# Patient Record
Sex: FEMALE | Race: OTHER | NOT HISPANIC OR LATINO | URBAN - METROPOLITAN AREA
[De-identification: names, ages, dates, MRNs, and addresses within clinical notes are randomized per-mention and may not be internally consistent; named-entity substitution may affect disease eponyms.]

---

## 2017-10-08 ENCOUNTER — INPATIENT (INPATIENT)
Facility: HOSPITAL | Age: 67
LOS: 2 days | Discharge: HOME | DRG: 308 | End: 2017-10-11
Attending: FAMILY MEDICINE | Admitting: HOSPITALIST
Payer: MEDICARE

## 2017-10-08 VITALS
TEMPERATURE: 98 F | HEIGHT: 61 IN | SYSTOLIC BLOOD PRESSURE: 115 MMHG | WEIGHT: 132.06 LBS | RESPIRATION RATE: 20 BRPM | DIASTOLIC BLOOD PRESSURE: 78 MMHG | HEART RATE: 132 BPM | OXYGEN SATURATION: 96 %

## 2017-10-08 DIAGNOSIS — Z95.2 PRESENCE OF PROSTHETIC HEART VALVE: Chronic | ICD-10-CM

## 2017-10-08 DIAGNOSIS — Z41.9 ENCOUNTER FOR PROCEDURE FOR PURPOSES OTHER THAN REMEDYING HEALTH STATE, UNSPECIFIED: Chronic | ICD-10-CM

## 2017-10-08 LAB
ALBUMIN SERPL ELPH-MCNC: 3.5 G/DL — SIGNIFICANT CHANGE UP (ref 3.3–5.2)
ALP SERPL-CCNC: 90 U/L — SIGNIFICANT CHANGE UP (ref 40–120)
ALT FLD-CCNC: 15 U/L — SIGNIFICANT CHANGE UP
ANION GAP SERPL CALC-SCNC: 13 MMOL/L — SIGNIFICANT CHANGE UP (ref 5–17)
APTT BLD: 45.8 SEC — HIGH (ref 27.5–37.4)
AST SERPL-CCNC: 22 U/L — SIGNIFICANT CHANGE UP
BASOPHILS # BLD AUTO: 0.1 K/UL — SIGNIFICANT CHANGE UP (ref 0–0.2)
BASOPHILS NFR BLD AUTO: 0.6 % — SIGNIFICANT CHANGE UP (ref 0–2)
BILIRUB SERPL-MCNC: 0.4 MG/DL — SIGNIFICANT CHANGE UP (ref 0.4–2)
BUN SERPL-MCNC: 15 MG/DL — SIGNIFICANT CHANGE UP (ref 8–20)
CALCIUM SERPL-MCNC: 7.8 MG/DL — LOW (ref 8.6–10.2)
CHLORIDE SERPL-SCNC: 106 MMOL/L — SIGNIFICANT CHANGE UP (ref 98–107)
CO2 SERPL-SCNC: 21 MMOL/L — LOW (ref 22–29)
CREAT SERPL-MCNC: 0.56 MG/DL — SIGNIFICANT CHANGE UP (ref 0.5–1.3)
EOSINOPHIL # BLD AUTO: 0.4 K/UL — SIGNIFICANT CHANGE UP (ref 0–0.5)
EOSINOPHIL NFR BLD AUTO: 4 % — SIGNIFICANT CHANGE UP (ref 0–6)
GLUCOSE SERPL-MCNC: 192 MG/DL — HIGH (ref 70–115)
HCT VFR BLD CALC: 35 % — LOW (ref 37–47)
HGB BLD-MCNC: 11.2 G/DL — LOW (ref 12–16)
INR BLD: 3.43 RATIO — HIGH (ref 0.88–1.16)
LYMPHOCYTES # BLD AUTO: 2.2 K/UL — SIGNIFICANT CHANGE UP (ref 1–4.8)
LYMPHOCYTES # BLD AUTO: 23.2 % — SIGNIFICANT CHANGE UP (ref 20–55)
MAGNESIUM SERPL-MCNC: 1.6 MG/DL — SIGNIFICANT CHANGE UP (ref 1.6–2.6)
MCHC RBC-ENTMCNC: 30.4 PG — SIGNIFICANT CHANGE UP (ref 27–31)
MCHC RBC-ENTMCNC: 32 G/DL — SIGNIFICANT CHANGE UP (ref 32–36)
MCV RBC AUTO: 94.9 FL — SIGNIFICANT CHANGE UP (ref 81–99)
MONOCYTES # BLD AUTO: 0.8 K/UL — SIGNIFICANT CHANGE UP (ref 0–0.8)
MONOCYTES NFR BLD AUTO: 8.4 % — SIGNIFICANT CHANGE UP (ref 3–10)
NEUTROPHILS # BLD AUTO: 6 K/UL — SIGNIFICANT CHANGE UP (ref 1.8–8)
NEUTROPHILS NFR BLD AUTO: 63.4 % — SIGNIFICANT CHANGE UP (ref 37–73)
NT-PROBNP SERPL-SCNC: 681 PG/ML — HIGH (ref 0–300)
PHOSPHATE SERPL-MCNC: 2.5 MG/DL — SIGNIFICANT CHANGE UP (ref 2.4–4.7)
PLATELET # BLD AUTO: 160 K/UL — SIGNIFICANT CHANGE UP (ref 150–400)
POTASSIUM SERPL-MCNC: 3.6 MMOL/L — SIGNIFICANT CHANGE UP (ref 3.5–5.3)
POTASSIUM SERPL-SCNC: 3.6 MMOL/L — SIGNIFICANT CHANGE UP (ref 3.5–5.3)
PROT SERPL-MCNC: 6.2 G/DL — LOW (ref 6.6–8.7)
PROTHROM AB SERPL-ACNC: 38.7 SEC — HIGH (ref 9.8–12.7)
RBC # BLD: 3.69 M/UL — LOW (ref 4.4–5.2)
RBC # FLD: 13.8 % — SIGNIFICANT CHANGE UP (ref 11–15.6)
SODIUM SERPL-SCNC: 140 MMOL/L — SIGNIFICANT CHANGE UP (ref 135–145)
TROPONIN T SERPL-MCNC: <0.01 NG/ML — SIGNIFICANT CHANGE UP (ref 0–0.06)
TSH SERPL-MCNC: 84.52 UIU/ML — HIGH (ref 0.27–4.2)
WBC # BLD: 9.4 K/UL — SIGNIFICANT CHANGE UP (ref 4.8–10.8)
WBC # FLD AUTO: 9.4 K/UL — SIGNIFICANT CHANGE UP (ref 4.8–10.8)

## 2017-10-08 PROCEDURE — 99291 CRITICAL CARE FIRST HOUR: CPT

## 2017-10-08 PROCEDURE — 93010 ELECTROCARDIOGRAM REPORT: CPT | Mod: 76

## 2017-10-08 PROCEDURE — 71010: CPT | Mod: 26

## 2017-10-08 RX ORDER — ASPIRIN/CALCIUM CARB/MAGNESIUM 324 MG
162 TABLET ORAL ONCE
Qty: 0 | Refills: 0 | Status: COMPLETED | OUTPATIENT
Start: 2017-10-08 | End: 2017-10-08

## 2017-10-08 RX ORDER — MAGNESIUM SULFATE 500 MG/ML
2 VIAL (ML) INJECTION ONCE
Qty: 0 | Refills: 0 | Status: COMPLETED | OUTPATIENT
Start: 2017-10-08 | End: 2017-10-08

## 2017-10-08 RX ORDER — AMIODARONE HYDROCHLORIDE 400 MG/1
150 TABLET ORAL ONCE
Qty: 0 | Refills: 0 | Status: COMPLETED | OUTPATIENT
Start: 2017-10-08 | End: 2017-10-08

## 2017-10-08 RX ORDER — AMIODARONE HYDROCHLORIDE 400 MG/1
1 TABLET ORAL
Qty: 900 | Refills: 0 | Status: DISCONTINUED | OUTPATIENT
Start: 2017-10-08 | End: 2017-10-08

## 2017-10-08 RX ADMIN — Medication 162 MILLIGRAM(S): at 22:15

## 2017-10-08 RX ADMIN — Medication 50 GRAM(S): at 21:22

## 2017-10-08 RX ADMIN — AMIODARONE HYDROCHLORIDE 618 MILLIGRAM(S): 400 TABLET ORAL at 21:25

## 2017-10-08 NOTE — ED ADULT TRIAGE NOTE - CHIEF COMPLAINT QUOTE
patient biba states that she has palpitations which started around 7:50 tonight, she states that her heart rate went up to 140, with complaints of shortness of breath and chest pain, she states that she was cleaning prior to symptoms, patient received 324 asa and fluids by ems.

## 2017-10-08 NOTE — ED PROVIDER NOTE - MEDICAL DECISION MAKING DETAILS
stable vtach upon presentation converedted with viji lopez and  spoke with dr whiting agrees with plan of care acs admission need for multiple bedside reassessments cardiopulmonary status

## 2017-10-08 NOTE — ED ADULT NURSE NOTE - OBJECTIVE STATEMENT
Pt brought in for elevated HR, chest tightness, nausea and sob starting at 8pm.  Denies chest pain; describes as chest tightness.  As per daughter, pt. was cleaning and started to feel "palpitations."  Before symptoms started, pt. was on a stool cleaning top of [employer].

## 2017-10-08 NOTE — ED ADULT NURSE REASSESSMENT NOTE - NS ED NURSE REASSESS COMMENT FT1
2L supplemental O2 applied for pt. O2 sat 89% room air.  Pt. O2 sat 95% following application of supplemental O2.

## 2017-10-08 NOTE — ED PROVIDER NOTE - CHPI ED SYMPTOMS POS
CHEST PAIN/lightheaded/PALPITATIONS CHEST TIGHTNESS/lightheaded/SHORTNESS OF BREATH/PALPITATIONS/NAUSEA

## 2017-10-08 NOTE — ED PROVIDER NOTE - PMH
AICD (automatic cardioverter/defibrillator) present    Cataract  bialt  Diabetes    Glaucoma    HTN (hypertension)    Hypothyroid    Mitral valve disease    Sarcoid

## 2017-10-08 NOTE — ED ADULT NURSE REASSESSMENT NOTE - NS ED NURSE REASSESS COMMENT FT1
Patient A&Ox4, denies any pain or discomfort. Respirations even & unlabored, denies any numbness or tingling. Cardiac monitor in place, paced rhythm. IV sites C/D/I, patent, negative s/s phlebitis or infiltration. Denies any chest pain, shortness of breath, nausea or dizziness. Abdomen soft, nontender, nondistended. Plan of care discussed, all questions answered. Family remains at bedside, will continue to monitor.

## 2017-10-08 NOTE — ED PROVIDER NOTE - CRITICAL CARE PROVIDED
direct patient care (not related to procedure)/consult w/ pt's family directly relating to pts condition/additional history taking/interpretation of diagnostic studies/documentation

## 2017-10-08 NOTE — ED PROVIDER NOTE - OBJECTIVE STATEMENT
68 y/o F pt with hx of pacemaker/ defibrillator , Mitral valve replacement (2005), open heart surgery (2005) presents to ED c/o palpitations and chest pain starting 19:50 tonight. She currently feeling lightheaded.  No previous symptoms of palpitations.  On Coumadin and recently placed on Entresto. no hx of DVT or PE. denies fever. denies HA or neck pain. no sob. no abd pain. no n/v/d. no urinary f/u/d. no back pain. no motor or sensory deficits. denies drug use. no recent travel. no rash. no other acute issues symptoms or concerns   Card: Chano Barfield (Cell: [phone])   PMD: Dr. Cleary   Surgeon: Dr. Cornejo 66 y/o F pt with hx of pacemaker/ defibrillator , Mitral valve replacement (2005), open heart surgery (2005) presents to ED c/o palpitations associated with nausea, mild SOB, lightheaded, chest tightness 19:50 tonight.   No previous symptoms of palpitations.  On Coumadin and recently placed on Entresto. no hx of DVT or PE. Daughter called pt's cardiologist PTA. denies fever. denies HA or neck pain. no abd pain. no v/d. no urinary f/u/d. no back pain. no motor or sensory deficits. denies drug use. no recent travel. no rash. no other acute issues symptoms or concerns   Card: Chano Barfield (Cell: [phone])   PMD: Dr. Cleary   Surgeon: Dr. Cornejo 66 y/o F pt with hx of pacemaker/ defibrillator , Mitral valve replacement (2005), open heart surgery (2005) presents to ED BIBA c/o palpitations associated with nausea, mild SOB, lightheaded, chest tightness 19:50 tonight.   No previous symptoms of palpitations.  On Coumadin and recently placed on Entresto. no hx of DVT or PE. Daughter called pt's cardiologist PTA. 324mg aspirin given by EMS. denies fever. denies HA or neck pain. no abd pain. no v/d. no urinary f/u/d. no back pain. no motor or sensory deficits. denies drug use. no recent travel. no rash. no other acute issues symptoms or concerns   Card: Chano Barfield (Cell: [phone])   PMD: Dr. Cleary   Surgeon: Dr. Cornejo

## 2017-10-08 NOTE — ED ADULT NURSE REASSESSMENT NOTE - NS ED NURSE REASSESS COMMENT FT1
Dr. Powell at patients bedside for evaluation Dr. Powell at patients bedside for evaluation, code team 1 called to bedside

## 2017-10-09 DIAGNOSIS — E11.65 TYPE 2 DIABETES MELLITUS WITH HYPERGLYCEMIA: ICD-10-CM

## 2017-10-09 DIAGNOSIS — D64.9 ANEMIA, UNSPECIFIED: ICD-10-CM

## 2017-10-09 DIAGNOSIS — D86.9 SARCOIDOSIS, UNSPECIFIED: ICD-10-CM

## 2017-10-09 DIAGNOSIS — Z95.810 PRESENCE OF AUTOMATIC (IMPLANTABLE) CARDIAC DEFIBRILLATOR: ICD-10-CM

## 2017-10-09 DIAGNOSIS — I10 ESSENTIAL (PRIMARY) HYPERTENSION: ICD-10-CM

## 2017-10-09 DIAGNOSIS — Z95.2 PRESENCE OF PROSTHETIC HEART VALVE: ICD-10-CM

## 2017-10-09 DIAGNOSIS — H40.9 UNSPECIFIED GLAUCOMA: ICD-10-CM

## 2017-10-09 DIAGNOSIS — I47.2 VENTRICULAR TACHYCARDIA: ICD-10-CM

## 2017-10-09 DIAGNOSIS — E03.9 HYPOTHYROIDISM, UNSPECIFIED: ICD-10-CM

## 2017-10-09 DIAGNOSIS — I47.1 SUPRAVENTRICULAR TACHYCARDIA: ICD-10-CM

## 2017-10-09 DIAGNOSIS — I24.9 ACUTE ISCHEMIC HEART DISEASE, UNSPECIFIED: ICD-10-CM

## 2017-10-09 DIAGNOSIS — I42.0 DILATED CARDIOMYOPATHY: ICD-10-CM

## 2017-10-09 DIAGNOSIS — Z29.9 ENCOUNTER FOR PROPHYLACTIC MEASURES, UNSPECIFIED: ICD-10-CM

## 2017-10-09 DIAGNOSIS — I05.9 RHEUMATIC MITRAL VALVE DISEASE, UNSPECIFIED: ICD-10-CM

## 2017-10-09 DIAGNOSIS — I50.23 ACUTE ON CHRONIC SYSTOLIC (CONGESTIVE) HEART FAILURE: ICD-10-CM

## 2017-10-09 LAB
ALBUMIN SERPL ELPH-MCNC: 3.9 G/DL — SIGNIFICANT CHANGE UP (ref 3.3–5.2)
ALP SERPL-CCNC: 106 U/L — SIGNIFICANT CHANGE UP (ref 40–120)
ALT FLD-CCNC: 15 U/L — SIGNIFICANT CHANGE UP
ANION GAP SERPL CALC-SCNC: 10 MMOL/L — SIGNIFICANT CHANGE UP (ref 5–17)
APPEARANCE UR: CLEAR — SIGNIFICANT CHANGE UP
APTT BLD: 47 SEC — HIGH (ref 27.5–37.4)
AST SERPL-CCNC: 21 U/L — SIGNIFICANT CHANGE UP
BILIRUB SERPL-MCNC: 0.7 MG/DL — SIGNIFICANT CHANGE UP (ref 0.4–2)
BILIRUB UR-MCNC: NEGATIVE — SIGNIFICANT CHANGE UP
BUN SERPL-MCNC: 11 MG/DL — SIGNIFICANT CHANGE UP (ref 8–20)
CALCIUM SERPL-MCNC: 8.4 MG/DL — LOW (ref 8.6–10.2)
CHLORIDE SERPL-SCNC: 106 MMOL/L — SIGNIFICANT CHANGE UP (ref 98–107)
CHOLEST SERPL-MCNC: 165 MG/DL — SIGNIFICANT CHANGE UP (ref 110–199)
CO2 SERPL-SCNC: 26 MMOL/L — SIGNIFICANT CHANGE UP (ref 22–29)
COLOR SPEC: YELLOW — SIGNIFICANT CHANGE UP
CREAT SERPL-MCNC: 0.56 MG/DL — SIGNIFICANT CHANGE UP (ref 0.5–1.3)
DIFF PNL FLD: ABNORMAL
GLUCOSE SERPL-MCNC: 134 MG/DL — HIGH (ref 70–115)
GLUCOSE UR QL: NEGATIVE MG/DL — SIGNIFICANT CHANGE UP
HBA1C BLD-MCNC: 6.1 % — HIGH (ref 4–5.6)
HCT VFR BLD CALC: 34.7 % — LOW (ref 37–47)
HDLC SERPL-MCNC: 58 MG/DL — LOW
HGB BLD-MCNC: 11.3 G/DL — LOW (ref 12–16)
INR BLD: 3.09 RATIO — HIGH (ref 0.88–1.16)
KETONES UR-MCNC: NEGATIVE — SIGNIFICANT CHANGE UP
LEUKOCYTE ESTERASE UR-ACNC: NEGATIVE — SIGNIFICANT CHANGE UP
LIPID PNL WITH DIRECT LDL SERPL: 84 MG/DL — SIGNIFICANT CHANGE UP
MAGNESIUM SERPL-MCNC: 2.4 MG/DL — SIGNIFICANT CHANGE UP (ref 1.6–2.6)
MCHC RBC-ENTMCNC: 30.7 PG — SIGNIFICANT CHANGE UP (ref 27–31)
MCHC RBC-ENTMCNC: 32.6 G/DL — SIGNIFICANT CHANGE UP (ref 32–36)
MCV RBC AUTO: 94.3 FL — SIGNIFICANT CHANGE UP (ref 81–99)
MYOGLOBIN SERPL-MCNC: 31 NG/ML — SIGNIFICANT CHANGE UP (ref 25–58)
NITRITE UR-MCNC: NEGATIVE — SIGNIFICANT CHANGE UP
PH UR: 7 — SIGNIFICANT CHANGE UP (ref 5–8)
PHOSPHATE SERPL-MCNC: 2.6 MG/DL — SIGNIFICANT CHANGE UP (ref 2.4–4.7)
PLATELET # BLD AUTO: 140 K/UL — LOW (ref 150–400)
POTASSIUM SERPL-MCNC: 4.4 MMOL/L — SIGNIFICANT CHANGE UP (ref 3.5–5.3)
POTASSIUM SERPL-SCNC: 4.4 MMOL/L — SIGNIFICANT CHANGE UP (ref 3.5–5.3)
PROT SERPL-MCNC: 6.6 G/DL — SIGNIFICANT CHANGE UP (ref 6.6–8.7)
PROT UR-MCNC: NEGATIVE MG/DL — SIGNIFICANT CHANGE UP
PROTHROM AB SERPL-ACNC: 34.8 SEC — HIGH (ref 9.8–12.7)
RBC # BLD: 3.68 M/UL — LOW (ref 4.4–5.2)
RBC # FLD: 14 % — SIGNIFICANT CHANGE UP (ref 11–15.6)
RBC CASTS # UR COMP ASSIST: NEGATIVE /HPF — SIGNIFICANT CHANGE UP (ref 0–4)
SODIUM SERPL-SCNC: 142 MMOL/L — SIGNIFICANT CHANGE UP (ref 135–145)
SP GR SPEC: 1.01 — SIGNIFICANT CHANGE UP (ref 1.01–1.02)
T3 SERPL-MCNC: 47 NG/DL — LOW (ref 80–200)
T4 AB SER-ACNC: 4 UG/DL — LOW (ref 4.5–12)
TOTAL CHOLESTEROL/HDL RATIO MEASUREMENT: 3 RATIO — LOW (ref 3.3–7.1)
TRIGL SERPL-MCNC: 115 MG/DL — SIGNIFICANT CHANGE UP (ref 10–200)
TROPONIN T SERPL-MCNC: 0.04 NG/ML — SIGNIFICANT CHANGE UP (ref 0–0.06)
TROPONIN T SERPL-MCNC: 0.05 NG/ML — SIGNIFICANT CHANGE UP (ref 0–0.06)
UROBILINOGEN FLD QL: NEGATIVE MG/DL — SIGNIFICANT CHANGE UP
WBC # BLD: 9.3 K/UL — SIGNIFICANT CHANGE UP (ref 4.8–10.8)
WBC # FLD AUTO: 9.3 K/UL — SIGNIFICANT CHANGE UP (ref 4.8–10.8)
WBC UR QL: SIGNIFICANT CHANGE UP

## 2017-10-09 PROCEDURE — 99223 1ST HOSP IP/OBS HIGH 75: CPT | Mod: 25

## 2017-10-09 PROCEDURE — 93010 ELECTROCARDIOGRAM REPORT: CPT

## 2017-10-09 PROCEDURE — 99497 ADVNCD CARE PLAN 30 MIN: CPT

## 2017-10-09 RX ORDER — ASPIRIN/CALCIUM CARB/MAGNESIUM 324 MG
81 TABLET ORAL DAILY
Qty: 0 | Refills: 0 | Status: DISCONTINUED | OUTPATIENT
Start: 2017-10-09 | End: 2017-10-11

## 2017-10-09 RX ORDER — SACUBITRIL AND VALSARTAN 24; 26 MG/1; MG/1
1 TABLET, FILM COATED ORAL
Qty: 0 | Refills: 0 | Status: DISCONTINUED | OUTPATIENT
Start: 2017-10-09 | End: 2017-10-11

## 2017-10-09 RX ORDER — FUROSEMIDE 40 MG
20 TABLET ORAL DAILY
Qty: 0 | Refills: 0 | Status: DISCONTINUED | OUTPATIENT
Start: 2017-10-09 | End: 2017-10-11

## 2017-10-09 RX ORDER — PREDNISOLONE SODIUM PHOSPHATE 1 %
1 DROPS OPHTHALMIC (EYE) DAILY
Qty: 0 | Refills: 0 | Status: DISCONTINUED | OUTPATIENT
Start: 2017-10-09 | End: 2017-10-11

## 2017-10-09 RX ORDER — SODIUM CHLORIDE 9 MG/ML
1000 INJECTION, SOLUTION INTRAVENOUS
Qty: 0 | Refills: 0 | Status: DISCONTINUED | OUTPATIENT
Start: 2017-10-09 | End: 2017-10-11

## 2017-10-09 RX ORDER — LEVOTHYROXINE SODIUM 125 MCG
75 TABLET ORAL DAILY
Qty: 0 | Refills: 0 | Status: DISCONTINUED | OUTPATIENT
Start: 2017-10-09 | End: 2017-10-11

## 2017-10-09 RX ORDER — LATANOPROST 0.05 MG/ML
1 SOLUTION/ DROPS OPHTHALMIC; TOPICAL AT BEDTIME
Qty: 0 | Refills: 0 | Status: DISCONTINUED | OUTPATIENT
Start: 2017-10-09 | End: 2017-10-11

## 2017-10-09 RX ORDER — DEXTROSE 50 % IN WATER 50 %
12.5 SYRINGE (ML) INTRAVENOUS ONCE
Qty: 0 | Refills: 0 | Status: DISCONTINUED | OUTPATIENT
Start: 2017-10-09 | End: 2017-10-11

## 2017-10-09 RX ORDER — DEXTROSE 50 % IN WATER 50 %
25 SYRINGE (ML) INTRAVENOUS ONCE
Qty: 0 | Refills: 0 | Status: DISCONTINUED | OUTPATIENT
Start: 2017-10-09 | End: 2017-10-11

## 2017-10-09 RX ORDER — NITROGLYCERIN 6.5 MG
0.5 CAPSULE, EXTENDED RELEASE ORAL EVERY 6 HOURS
Qty: 0 | Refills: 0 | Status: DISCONTINUED | OUTPATIENT
Start: 2017-10-09 | End: 2017-10-11

## 2017-10-09 RX ORDER — DORZOLAMIDE HYDROCHLORIDE 20 MG/ML
1 SOLUTION/ DROPS OPHTHALMIC DAILY
Qty: 0 | Refills: 0 | Status: DISCONTINUED | OUTPATIENT
Start: 2017-10-09 | End: 2017-10-11

## 2017-10-09 RX ORDER — CARVEDILOL PHOSPHATE 80 MG/1
3.12 CAPSULE, EXTENDED RELEASE ORAL EVERY 12 HOURS
Qty: 0 | Refills: 0 | Status: DISCONTINUED | OUTPATIENT
Start: 2017-10-09 | End: 2017-10-11

## 2017-10-09 RX ORDER — CARVEDILOL PHOSPHATE 80 MG/1
3.12 CAPSULE, EXTENDED RELEASE ORAL
Qty: 0 | Refills: 0 | Status: DISCONTINUED | OUTPATIENT
Start: 2017-10-09 | End: 2017-10-09

## 2017-10-09 RX ORDER — INSULIN LISPRO 100/ML
VIAL (ML) SUBCUTANEOUS
Qty: 0 | Refills: 0 | Status: DISCONTINUED | OUTPATIENT
Start: 2017-10-09 | End: 2017-10-11

## 2017-10-09 RX ORDER — ATORVASTATIN CALCIUM 80 MG/1
20 TABLET, FILM COATED ORAL AT BEDTIME
Qty: 0 | Refills: 0 | Status: DISCONTINUED | OUTPATIENT
Start: 2017-10-09 | End: 2017-10-11

## 2017-10-09 RX ORDER — SOTALOL HCL 120 MG
80 TABLET ORAL
Qty: 0 | Refills: 0 | Status: DISCONTINUED | OUTPATIENT
Start: 2017-10-09 | End: 2017-10-10

## 2017-10-09 RX ORDER — AMIODARONE HYDROCHLORIDE 400 MG/1
400 TABLET ORAL THREE TIMES A DAY
Qty: 0 | Refills: 0 | Status: DISCONTINUED | OUTPATIENT
Start: 2017-10-09 | End: 2017-10-10

## 2017-10-09 RX ORDER — INFLUENZA VIRUS VACCINE 15; 15; 15; 15 UG/.5ML; UG/.5ML; UG/.5ML; UG/.5ML
0.5 SUSPENSION INTRAMUSCULAR ONCE
Qty: 0 | Refills: 0 | Status: COMPLETED | OUTPATIENT
Start: 2017-10-09 | End: 2017-10-11

## 2017-10-09 RX ORDER — DEXTROSE 50 % IN WATER 50 %
1 SYRINGE (ML) INTRAVENOUS ONCE
Qty: 0 | Refills: 0 | Status: DISCONTINUED | OUTPATIENT
Start: 2017-10-09 | End: 2017-10-11

## 2017-10-09 RX ORDER — GLUCAGON INJECTION, SOLUTION 0.5 MG/.1ML
1 INJECTION, SOLUTION SUBCUTANEOUS ONCE
Qty: 0 | Refills: 0 | Status: DISCONTINUED | OUTPATIENT
Start: 2017-10-09 | End: 2017-10-11

## 2017-10-09 RX ADMIN — SACUBITRIL AND VALSARTAN 1 TABLET(S): 24; 26 TABLET, FILM COATED ORAL at 06:32

## 2017-10-09 RX ADMIN — Medication 80 MILLIGRAM(S): at 17:31

## 2017-10-09 RX ADMIN — Medication 75 MICROGRAM(S): at 06:32

## 2017-10-09 RX ADMIN — DORZOLAMIDE HYDROCHLORIDE 1 DROP(S): 20 SOLUTION/ DROPS OPHTHALMIC at 15:32

## 2017-10-09 RX ADMIN — CARVEDILOL PHOSPHATE 3.12 MILLIGRAM(S): 80 CAPSULE, EXTENDED RELEASE ORAL at 06:32

## 2017-10-09 RX ADMIN — Medication 20 MILLIGRAM(S): at 13:20

## 2017-10-09 RX ADMIN — Medication 81 MILLIGRAM(S): at 13:19

## 2017-10-09 RX ADMIN — Medication 80 MILLIGRAM(S): at 06:32

## 2017-10-09 RX ADMIN — Medication 1 DROP(S): at 14:48

## 2017-10-09 RX ADMIN — ATORVASTATIN CALCIUM 20 MILLIGRAM(S): 80 TABLET, FILM COATED ORAL at 21:54

## 2017-10-09 RX ADMIN — CARVEDILOL PHOSPHATE 3.12 MILLIGRAM(S): 80 CAPSULE, EXTENDED RELEASE ORAL at 17:31

## 2017-10-09 RX ADMIN — SACUBITRIL AND VALSARTAN 1 TABLET(S): 24; 26 TABLET, FILM COATED ORAL at 17:32

## 2017-10-09 RX ADMIN — AMIODARONE HYDROCHLORIDE 400 MILLIGRAM(S): 400 TABLET ORAL at 15:32

## 2017-10-09 NOTE — H&P ADULT - ATTENDING COMMENTS
Advance care planning discussed with patient and family, over 20 min spent discussing plan of care, laboratory findings, and code status. pt is FULL CODE. All questions answered and family and pt in agreement with plan and state understanding.

## 2017-10-09 NOTE — CONSULT NOTE ADULT - PROBLEM SELECTOR RECOMMENDATION 9
Patient presented 10/8/17 with stable, sustained and monomorphic VT with HR fo 138 bpm.  HR was below detection of patient's ICD.  1.  Discontinue Sotalol.  2.  Amiodarone 400 mg orally tid.   3.  Admit to telemetry.  2.  Amiodarone 400 mg orally tid.   3.  Will have ICD interrogated by [employer] rep.

## 2017-10-09 NOTE — H&P ADULT - ASSESSMENT
66 yo female with a pmh/o pacemaker/ defibrillator , Mitral valve replacement (2005), open heart surgery (2005), hypothyroidism, DMII, anemia, who is admitted for SVT.

## 2017-10-09 NOTE — CONSULT NOTE ADULT - ASSESSMENT
67 y.o female with h/o paroxysmal atrial fibrillation/tachycardia, MVP with  MR, nonischemic DCM, systemic sarcoidosis involving heart, eyes, nose diagnosed in '96 and treated with Prednisone, pulmonary sarcoidosis treated in '12 with steroids, hypothyroidism, paroxysmal VT treated with Sotalol and then Amiodarone, penumonia diagnosed 3/25/11, s/p CVA 5/16/11, s/p MVR with mechanical [place] valve 105/05, s/p DDD ICD implantation in '99, s/p upgrade to DDD biventricular ICD 9/2908, s/p DDD biventricular ICD pulse generator change1/29/14, who presented with nausea, dyspnea, chest pain at 8:00 PM after cleaning her fire place for 1 hour.  She found her heart rate elevated at 147 bpm and never recieved an ICD shock or had syncope.  She was BIBA and diagnosed with sustained monomorphic VT with RBBB/LIZZ at a HR of 138 bpm.  Patient converted to DDD paced rhythm after being given Amidoarone IVP and IV magnesium sulfate.  Patient admitted with stable, sustained, slow, and monomorphic ventricular tachycardia that terminated with IV Amiodarone.  CXR and physical exam suggestive of early acute on chronic systolic CHF.

## 2017-10-09 NOTE — H&P ADULT - PROBLEM SELECTOR PLAN 2
chest pain resolved once pt in sinus rhythm  serial CE  first trop wnl  TTE  HbA1c  fasting lipid panel  cont aspirin, on entresto  pt anticoagulated on coumadin  cont statin  EKG reviewed, EKG prn CP  NTG prn CP

## 2017-10-09 NOTE — PROGRESS NOTE ADULT - SUBJECTIVE AND OBJECTIVE BOX
LAURA MCDONALD Patient is a 67y old  Female who presents with a chief complaint of chest pain and palpitations (09 Oct 2017 01:46)     HPI:  6 y/o F pt with hx of pacemaker/ defibrillator , Mitral valve replacement (2005), open heart surgery (2005), DMII, HTN, HLD, vertigo who presented to ED via ambulance after experiencing chest pain at home while cleaning her fireplace. Pt describes the pain as centrally located in chest, heavy pressure/tightness in nature, non radiating, no a/a factors, associated with light headedness, palpitations, nausea, and SOB. Pt states she took her heart rate and it was in high 130's so her daughter called cardiologist and EMS. Daughter and  at bedside to provide additional history and confirm series of events as described pt patient. Pt denied any diaphoresis, abdominal pain, vomiting, diarrhea/constipation, gu sx, radiation to left arm/jaw, dizziness, loss of sensation, visual or speech changes, or weakness.   Of note, pt reports compliance with all medications except synthroid which she states she has missed 'a few' days worth of. (09 Oct 2017 01:46)    The patient was seen and evaluated   The patient is in no acute distress.  Denied any fever chest pain, palpitations, shortness of breath, abdominal pain, fever, dysuria, cough, edema   Complains of     Height (cm): 154.94 (10-08 @ 21:00)  Weight (kg): 59.9 (10-08 @ 21:00)  BMI (kg/m2): 25 (10-08 @ 21:00)  BSA (m2): 1.58 (10-08 @ 21:00)I&O's Summary    Allergies    No Known Allergies    Intolerances      HEALTH ISSUES - PROBLEM Dx:  SVT (supraventricular tachycardia): SVT (supraventricular tachycardia)  Need for prophylactic measure: Need for prophylactic measure  Glaucoma of both eyes, unspecified glaucoma type: Glaucoma of both eyes, unspecified glaucoma type  Acquired hypothyroidism: Acquired hypothyroidism  Type 2 diabetes mellitus with hyperglycemia, without long-term current use of insulin: Type 2 diabetes mellitus with hyperglycemia, without long-term current use of insulin  Mitral valve disease: Mitral valve disease  Anemia, unspecified type: Anemia, unspecified type  ACS (acute coronary syndrome): ACS (acute coronary syndrome)  Ventricular tachycardia: Ventricular tachycardia        PAST MEDICAL & SURGICAL HISTORY:  Glaucoma  Sarcoid  AICD (automatic cardioverter/defibrillator) present  Mitral valve disease  Cataract: bialt  HTN (hypertension)  Diabetes  Hypothyroid  H/O mitral valve replacement  Elective surgery: back surgury, mitral valve replcement. AICD          Vital Signs Last 24 Hrs  T(C): 36.6 (09 Oct 2017 08:46), Max: 37.2 (08 Oct 2017 21:11)  T(F): 97.8 (09 Oct 2017 08:46), Max: 99 (08 Oct 2017 21:11)  HR: 60 (09 Oct 2017 08:46) (60 - 132)  BP: 99/59 (09 Oct 2017 08:46) (99/59 - 115/78)  BP(mean): --  RR: 18 (09 Oct 2017 08:46) (18 - 20)  SpO2: 98% (09 Oct 2017 08:46) (96% - 98%)T(C): 36.6 (10-09-17 @ 08:46), Max: 37.2 (10-08-17 @ 21:11)  HR: 60 (10-09-17 @ 08:46) (60 - 132)  BP: 99/59 (10-09-17 @ 08:46) (99/59 - 115/78)  RR: 18 (10-09-17 @ 08:46) (18 - 20)  SpO2: 98% (10-09-17 @ 08:46) (96% - 98%)  Wt(kg): --    PHYSICAL EXAM:    GENERAL: NAD, well-groomed, well-developed  HEAD:  Atraumatic, Normocephalic  EYES: EOMI, PERRLA, conjunctiva and sclera clear  ENMT:  Moist mucous membranes,  No lesions  NECK: Supple, No JVD, Normal thyroid  NERVOUS SYSTEM:  Alert & Oriented X3,  Moves upper and lower extremities; DTRs 2+ intact and symmetric  CHEST/LUNG: Clear to auscultation bilaterally; No rales, rhonchi, wheezing,   HEART: Regular rate and rhythm; No murmurs,   ABDOMEN: Soft, Nontender, Nondistended; Bowel sounds present  EXTREMITIES:  Peripheral Pulses, No  cyanosis, or edema  SKIN: No rashes or lesions    aspirin enteric coated 81 milliGRAM(s) Oral daily  atorvastatin 20 milliGRAM(s) Oral at bedtime  carvedilol 3.125 milliGRAM(s) Oral every 12 hours  dextrose 5%. 1000 milliLiter(s) IV Continuous <Continuous>  dextrose 50% Injectable 12.5 Gram(s) IV Push once  dextrose 50% Injectable 25 Gram(s) IV Push once  dextrose 50% Injectable 25 Gram(s) IV Push once  dextrose Gel 1 Dose(s) Oral once PRN  dorzolamide 2% Ophthalmic Solution 1 Drop(s) Both EYES daily  glucagon  Injectable 1 milliGRAM(s) IntraMuscular once PRN  influenza   Vaccine 0.5 milliLiter(s) IntraMuscular once  insulin lispro (HumaLOG) corrective regimen sliding scale   SubCutaneous three times a day before meals  latanoprost 0.005% Ophthalmic Solution 1 Drop(s) Both EYES at bedtime  levothyroxine 75 MICROGram(s) Oral daily  nitroglycerin    2% Ointment 0.5 Inch(s) Transdermal every 6 hours PRN  prednisoLONE acetate 1% Suspension 1 Drop(s) Both EYES daily  sacubitril 24 mG/valsartan 26 mG 1 Tablet(s) Oral two times a day  sotalol 80 milliGRAM(s) Oral two times a day      LABS:                          11.3   9.3   )-----------( 140      ( 09 Oct 2017 08:08 )             34.7     10-09    142  |  106  |  11.0  ----------------------------<  134<H>  4.4   |  26.0  |  0.56    Ca    8.4<L>      09 Oct 2017 08:08  Phos  2.6     10-09  Mg     2.4     10-09    TPro  6.6  /  Alb  3.9  /  TBili  0.7  /  DBili  x   /  AST  21  /  ALT  15  /  AlkPhos  106  10-09    LIVER FUNCTIONS - ( 09 Oct 2017 08:08 )  Alb: 3.9 g/dL / Pro: 6.6 g/dL / ALK PHOS: 106 U/L / ALT: 15 U/L / AST: 21 U/L / GGT: x           PT/INR - ( 09 Oct 2017 08:08 )   PT: 34.8 sec;   INR: 3.09 ratio         PTT - ( 09 Oct 2017 08:08 )  PTT:47.0 sec  CARDIAC MARKERS ( 09 Oct 2017 08:08 )  x     / 0.04 ng/mL / x     / x     / x      CARDIAC MARKERS ( 09 Oct 2017 03:53 )  x     / 0.05 ng/mL / x     / x     / x      CARDIAC MARKERS ( 08 Oct 2017 21:28 )  x     / <0.01 ng/mL / x     / x     / x            CAPILLARY BLOOD GLUCOSE  134 (09 Oct 2017 08:08)          RADIOLOGY & ADDITIONAL TESTS:      Consultant notes reviewed    Case discussed with consultant/provider/ family /patient LAURA MCDONALD Patient is a 67y old  Female who presents with a chief complaint of chest pain and palpitations (09 Oct 2017 01:46)     HPI:  8 y/o F pt with hx of pacemaker/ defibrillator , Mitral valve replacement (2005), open heart surgery (2005), DMII, HTN, HLD, vertigo who presented to ED via ambulance after experiencing chest pain at home while cleaning her fireplace. Pt describes the pain as centrally located in chest, heavy pressure/tightness in nature, non radiating, no a/a factors, associated with light headedness, palpitations, nausea, and SOB. Pt states she took her heart rate and it was in high 130's so her daughter called cardiologist and EMS. Daughter and  at bedside to provide additional history and confirm series of events as described pt patient. Pt denied any diaphoresis, abdominal pain, vomiting, diarrhea/constipation, gu sx, radiation to left arm/jaw, dizziness, loss of sensation, visual or speech changes, or weakness.   Of note, pt reports compliance with all medications except synthroid which she states she has missed 'a few' days worth of. (09 Oct 2017 01:46)    The patient was seen and evaluated SVT  The patient is in no acute distress.  Denied any fever chest pain, abdominal pain, fever, dysuria, cough, edema   Complains of being worried about her diagnosis    Height (cm): 154.94 (10-08 @ 21:00)  Weight (kg): 59.9 (10-08 @ 21:00)  BMI (kg/m2): 25 (10-08 @ 21:00)  BSA (m2): 1.58 (10-08 @ 21:00)I&O's Summary    Allergies    No Known Allergies    Intolerances      HEALTH ISSUES - PROBLEM Dx:  SVT (supraventricular tachycardia): SVT (supraventricular tachycardia)  Need for prophylactic measure: Need for prophylactic measure  Glaucoma of both eyes, unspecified glaucoma type: Glaucoma of both eyes, unspecified glaucoma type  Acquired hypothyroidism: Acquired hypothyroidism  Type 2 diabetes mellitus with hyperglycemia, without long-term current use of insulin: Type 2 diabetes mellitus with hyperglycemia, without long-term current use of insulin  Mitral valve disease: Mitral valve disease  Anemia, unspecified type: Anemia, unspecified type  ACS (acute coronary syndrome): ACS (acute coronary syndrome)  Ventricular tachycardia: Ventricular tachycardia        PAST MEDICAL & SURGICAL HISTORY:  Glaucoma  Sarcoid  AICD (automatic cardioverter/defibrillator) present  Mitral valve disease  Cataract: bialt  HTN (hypertension)  Diabetes  Hypothyroid  H/O mitral valve replacement  Elective surgery: back surgury, mitral valve replcement. AICD          Vital Signs Last 24 Hrs  T(C): 36.6 (09 Oct 2017 08:46), Max: 37.2 (08 Oct 2017 21:11)  T(F): 97.8 (09 Oct 2017 08:46), Max: 99 (08 Oct 2017 21:11)  HR: 60 (09 Oct 2017 08:46) (60 - 132)  BP: 99/59 (09 Oct 2017 08:46) (99/59 - 115/78)  BP(mean): --  RR: 18 (09 Oct 2017 08:46) (18 - 20)  SpO2: 98% (09 Oct 2017 08:46) (96% - 98%)T(C): 36.6 (10-09-17 @ 08:46), Max: 37.2 (10-08-17 @ 21:11)  HR: 60 (10-09-17 @ 08:46) (60 - 132)  BP: 99/59 (10-09-17 @ 08:46) (99/59 - 115/78)  RR: 18 (10-09-17 @ 08:46) (18 - 20)  SpO2: 98% (10-09-17 @ 08:46) (96% - 98%)  Wt(kg): --    PHYSICAL EXAM:    GENERAL: NAD, well-groomed, well-developed  HEAD:  Atraumatic, Normocephalic  EYES: EOMI, PERRLA, conjunctiva and sclera clear  ENMT:  Moist mucous membranes,  No lesions  NECK: Supple, No JVD, Normal thyroid  NERVOUS SYSTEM:  Alert & Oriented X3,  Moves upper and lower extremities; DTRs 2+ intact and symmetric  CHEST/LUNG: Clear to auscultation bilaterally; No rales, rhonchi, wheezing,   HEART: Regular rate and rhythm; No murmurs,   ABDOMEN: Soft, Nontender, Nondistended; Bowel sounds present  EXTREMITIES:  Peripheral Pulses, No  cyanosis, or edema  SKIN: No rashes or lesions    aspirin enteric coated 81 milliGRAM(s) Oral daily  atorvastatin 20 milliGRAM(s) Oral at bedtime  carvedilol 3.125 milliGRAM(s) Oral every 12 hours  dextrose 5%. 1000 milliLiter(s) IV Continuous <Continuous>  dextrose 50% Injectable 12.5 Gram(s) IV Push once  dextrose 50% Injectable 25 Gram(s) IV Push once  dextrose 50% Injectable 25 Gram(s) IV Push once  dextrose Gel 1 Dose(s) Oral once PRN  dorzolamide 2% Ophthalmic Solution 1 Drop(s) Both EYES daily  glucagon  Injectable 1 milliGRAM(s) IntraMuscular once PRN  influenza   Vaccine 0.5 milliLiter(s) IntraMuscular once  insulin lispro (HumaLOG) corrective regimen sliding scale   SubCutaneous three times a day before meals  latanoprost 0.005% Ophthalmic Solution 1 Drop(s) Both EYES at bedtime  levothyroxine 75 MICROGram(s) Oral daily  nitroglycerin    2% Ointment 0.5 Inch(s) Transdermal every 6 hours PRN  prednisoLONE acetate 1% Suspension 1 Drop(s) Both EYES daily  sacubitril 24 mG/valsartan 26 mG 1 Tablet(s) Oral two times a day  sotalol 80 milliGRAM(s) Oral two times a day      LABS:                          11.3   9.3   )-----------( 140      ( 09 Oct 2017 08:08 )             34.7     10-09    142  |  106  |  11.0  ----------------------------<  134<H>  4.4   |  26.0  |  0.56    Ca    8.4<L>      09 Oct 2017 08:08  Phos  2.6     10-09  Mg     2.4     10-09    TPro  6.6  /  Alb  3.9  /  TBili  0.7  /  DBili  x   /  AST  21  /  ALT  15  /  AlkPhos  106  10-09    LIVER FUNCTIONS - ( 09 Oct 2017 08:08 )  Alb: 3.9 g/dL / Pro: 6.6 g/dL / ALK PHOS: 106 U/L / ALT: 15 U/L / AST: 21 U/L / GGT: x           PT/INR - ( 09 Oct 2017 08:08 )   PT: 34.8 sec;   INR: 3.09 ratio         PTT - ( 09 Oct 2017 08:08 )  PTT:47.0 sec  CARDIAC MARKERS ( 09 Oct 2017 08:08 )  x     / 0.04 ng/mL / x     / x     / x      CARDIAC MARKERS ( 09 Oct 2017 03:53 )  x     / 0.05 ng/mL / x     / x     / x      CARDIAC MARKERS ( 08 Oct 2017 21:28 )  x     / <0.01 ng/mL / x     / x     / x            CAPILLARY BLOOD GLUCOSE  134 (09 Oct 2017 08:08)          RADIOLOGY & ADDITIONAL TESTS:      Consultant notes reviewed    Case discussed with consultant/provider/ family /patient

## 2017-10-09 NOTE — H&P ADULT - PROBLEM SELECTOR PLAN 3
h/h at baseline  no active bleeding reported  pt on coumadin, monitor INR and dose daily, takes 1 mg daily

## 2017-10-09 NOTE — H&P ADULT - PROBLEM SELECTOR PLAN 7
cont prednisolone, latanoprost, Dorzalamide  confirm eye drops with pharmacy, pt medication list only had strength, not frequency and pt unsure

## 2017-10-09 NOTE — CONSULT NOTE ADULT - SUBJECTIVE AND OBJECTIVE BOX
Patient is a 67y old  Female who presents with a chief complaint of chest pain and palpitations (09 Oct 2017 01:46)        PAST MEDICAL & SURGICAL HISTORY:  Glaucoma  Sarcoid  AICD (automatic cardioverter/defibrillator) present  Mitral valve disease  Cataract: bialt  HTN (hypertension)  Diabetes  Hypothyroid  H/O mitral valve replacement  Elective surgery: back surgury, mitral valve replcement. AICD      HPI:  67 y.o female with h/o paroxysmal atrial fibrillation/tachycardia, MVP with  MR, nonischemic DCM, systemic sarcoidosis involving heart, eyes, nose diagnosed in  and treated with Prednisone, pulmonary sarcoidosis treated in  with steroids, hypothyroidism, paroxysmal VT treated with Sotalol and then Amiodarone, penumonia diagnosed 3/25/11, s/p CVA 11, s/p MVR with mechanical [employer] valve , s/p DDD ICD implantation in , s/p upgrade to DDD biventricular ICD 2908, s/p DDD biventricular ICD pulse generator change14, who presented with nausea, dyspnea, chest pain at 8:00 PM after cleaning her fire place for 1 hour.  She found her heart rate elevated at 147 bpm and never recieved an ICD shock or had syncope.  She was BIBA and diagnosed with sustained monomorphic VT with RBBB/LIZZ at a HR of 138 bpm.  Patient converted to DDD paced rhythm after being given Amidoarone IVP and IV magnesium sulfate.  She currently feels well with no chest pain or dyspnea.              FINDINGS:  MEDICATIONS  (STANDING):  aspirin enteric coated 81 milliGRAM(s) Oral daily  atorvastatin 20 milliGRAM(s) Oral at bedtime  carvedilol 3.125 milliGRAM(s) Oral every 12 hours  dextrose 5%. 1000 milliLiter(s) (50 mL/Hr) IV Continuous <Continuous>  dextrose 50% Injectable 12.5 Gram(s) IV Push once  dextrose 50% Injectable 25 Gram(s) IV Push once  dextrose 50% Injectable 25 Gram(s) IV Push once  dorzolamide 2% Ophthalmic Solution 1 Drop(s) Both EYES daily  influenza   Vaccine 0.5 milliLiter(s) IntraMuscular once  insulin lispro (HumaLOG) corrective regimen sliding scale   SubCutaneous three times a day before meals  latanoprost 0.005% Ophthalmic Solution 1 Drop(s) Both EYES at bedtime  levothyroxine 75 MICROGram(s) Oral daily  prednisoLONE acetate 1% Suspension 1 Drop(s) Both EYES daily  sacubitril 24 mG/valsartan 26 mG 1 Tablet(s) Oral two times a day  sotalol 80 milliGRAM(s) Oral two times a day    MEDICATIONS  (PRN):  dextrose Gel 1 Dose(s) Oral once PRN Blood Glucose LESS THAN 70 milliGRAM(s)/deciliter  glucagon  Injectable 1 milliGRAM(s) IntraMuscular once PRN Glucose LESS THAN 70 milligrams/deciliter  nitroglycerin    2% Ointment 0.5 Inch(s) Transdermal every 6 hours PRN chest pain      FAMILY HISTORY:  No pertinent family history in first degree relatives           REVIEW OF SYSTEMS:    CONSTITUTIONAL: No fever, weight loss, chills, shakes, or fatigue  EYES: No eye pain, visual disturbances, or discharge  ENMT:  No difficulty hearing, tinnitus, vertigo; No sinus or throat pain  NECK: No pain or stiffness  BREASTS: No pain, masses, or nipple discharge  RESPIRATORY: No cough, wheezing, hemoptysis, or shortness of breath  CARDIOVASCULAR: Positive for chest pain, palpitations, and dyspnea.  No syncope.  Intermittent dizziness.    GASTROINTESTINAL: No abdominal  or epigastric pain, nausea, vomiting, hematemesis, diarrhea, constipation, melena or bright red blood.  GENITOURINARY: No dysuria, nocturia, hematuria, or urinary incontinence  NEUROLOGICAL: No headaches, memory loss, slurred speech, limb weakness, loss of strength, numbness, or tremors  SKIN: No itching, burning, rashes, or lesions   LYMPH NODES: No enlarged glands  ENDOCRINE: No heat or cold intolerance, or hair loss  MUSCULOSKELETAL: Intermittent lower extremity edema.   PSYCHIATRIC: No depression, anxiety, or difficulty sleeping  HEME/LYMPH: No easy bruising or bleeding gums  ALLERY AND IMMUNOLOGIC: No hives or rash.      Vital Signs Last 24 Hrs  T(C): 36.6 (09 Oct 2017 08:46), Max: 37.2 (08 Oct 2017 21:11)  T(F): 97.8 (09 Oct 2017 08:46), Max: 99 (08 Oct 2017 21:11)  HR: 60 (09 Oct 2017 08:46) (60 - 132)  BP: 99/59 (09 Oct 2017 08:46) (99/59 - 115/78)  BP(mean): --  RR: 18 (09 Oct 2017 08:46) (18 - 20)  SpO2: 98% (09 Oct 2017 08:46) (96% - 98%)    PHYSICAL EXAM:    GENERAL: In no apparent distress, well nourished, and hydrated.  HEAD:  Atraumatic, Normocephalic  EYES: EOMI, PERRLA, conjunctiva and sclera clear  ENMT: No tonsillar erythema, exudates, or enlargement; Moist mucous membranes, Good dentition, No lesions  NECK: Supple and normal thyroid.  No JVD or carotid bruit.  Carotid pulse is 2+ bilaterally.  HEART: Regular rate and rhythm; No murmurs, rubs, or gallops.  PULMONARY: Crackles 1/3 way up on the left side.   ABDOMEN: Soft, Nontender, Nondistended; Bowel sounds present  EXTREMITIES:   No clubbing, cyanosis, or edema  NEUROLOGICAL: Grossly nonfocal          INTERPRETATION OF TELEMETRY:    ECG:    I&O's Detail      LABS:                        11.3   9.3   )-----------( 140      ( 09 Oct 2017 08:08 )             34.7     10-    142  |  106  |  11.0  ----------------------------<  134<H>  4.4   |  26.0  |  0.56    Ca    8.4<L>      09 Oct 2017 08:08  Phos  2.6     10-09  Mg     2.4     10-    TPro  6.6  /  Alb  3.9  /  TBili  0.7  /  DBili  x   /  AST  21  /  ALT  15  /  AlkPhos  106  10-09    CARDIAC MARKERS ( 09 Oct 2017 08:08 )  x     / 0.04 ng/mL / x     / x     / x      CARDIAC MARKERS ( 09 Oct 2017 03:53 )  x     / 0.05 ng/mL / x     / x     / x      CARDIAC MARKERS ( 08 Oct 2017 21:28 )  x     / <0.01 ng/mL / x     / x     / x          PT/INR - ( 09 Oct 2017 08:08 )   PT: 34.8 sec;   INR: 3.09 ratio         PTT - ( 09 Oct 2017 08:08 )  PTT:47.0 sec  Urinalysis Basic - ( 09 Oct 2017 11:27 )    Color: Yellow / Appearance: Clear / S.010 / pH: x  Gluc: x / Ketone: Negative  / Bili: Negative / Urobili: Negative mg/dL   Blood: x / Protein: Negative mg/dL / Nitrite: Negative   Leuk Esterase: Negative / RBC: Negative /HPF / WBC 0-2   Sq Epi: x / Non Sq Epi: x / Bacteria: x      BNPSerum Pro-Brain Natriuretic Peptide: 681 pg/mL (10-08 @ 21:28)    I&O's Detail    Daily Height in cm: 154.94 (08 Oct 2017 21:00)    Daily     RADIOLOGY & ADDITIONAL STUDIES:  CXR positive for cardiomegaly and right lower lobe infiltrate.

## 2017-10-09 NOTE — PROGRESS NOTE ADULT - PROBLEM SELECTOR PLAN 1
continue to follow-up with cardiologist cardiologist very detailed note as follows  "with h/o paroxysmal atrial fibrillation/tachycardia, MVP with  MR, nonischemic DCM, systemic sarcoidosis involving heart, eyes, nose diagnosed in '96 and treated with Prednisone, pulmonary sarcoidosis treated in '12 with steroids, hypothyroidism, paroxysmal VT treated with Sotalol and then Amiodarone, penumonia diagnosed 3/25/11, s/p CVA 5/16/11, s/p MVR with mechanical [employer] valve 105/05, s/p DDD ICD implantation in '99, s/p upgrade to DDD biventricular ICD 9/2908, s/p DDD biventricular ICD pulse generator change1/29/14, who presented with nausea, dyspnea, chest pain at 8:00 PM after cleaning her fire place for 1 hour.  She found her heart rate elevated at 147 bpm and never recieved an ICD shock or had syncope.  She was BIBA and diagnosed with sustained monomorphic VT with RBBB/LIZZ at a HR of 138 bpm.  Patient converted to DDD paced rhythm after being given Amidoarone IVP and IV magnesium sulfate.  Patient admitted with stable, sustained, slow, and monomorphic ventricular tachycardia that terminated with IV Amiodarone.  CXR and physical exam suggestive of early acute on chronic systolic CHF.        Problem/Recommendation - 1:  Problem: Ventricular tachycardia. Recommendation: Patient presented 10/8/17 with stable, sustained and monomorphic VT with HR fo 138 bpm.  HR was below detection of patient's ICD.  1.  Discontinue Sotalol.  2.  Amiodarone 400 mg orally tid.   3.  Admit to telemetry.  2.  Amiodarone 400 mg orally tid.   3.  Will have ICD interrogated by [employer] rep."

## 2017-10-09 NOTE — H&P ADULT - HISTORY OF PRESENT ILLNESS
6 y/o F pt with hx of pacemaker/ defibrillator , Mitral valve replacement (2005), open heart surgery (2005), DMII, HTN, HLD, vertigo who presented to ED via ambulance after experiencing chest pain at home while cleaning her fireplace. Pt describes the pain as centrally located in chest, heavy pressure/tightness in nature, non radiating, no a/a factors, associated with light headedness, palpitations, nausea, and SOB. Pt states she took her heart rate and it was in high 130's so her daughter called cardiologist and EMS. Daughter and  at bedside to provide additional history and confirm series of events as described pt patient. Pt denied any diaphoresis, abdominal pain, vomiting, diarrhea/constipation, gu sx, radiation to left arm/jaw, dizziness, loss of sensation, visual or speech changes, or weakness.   Of note, pt reports compliance with all medications except synthroid which she states she has missed 'a few' days worth of.

## 2017-10-09 NOTE — CHART NOTE - NSCHARTNOTEFT_GEN_A_CORE
Troponin and myoglobin ordered STAT at 0:31 not collected as of yet, as per lab only one phlebotomist available. Urgency of blood draw emphasized and phlebotomy to perform task ASAP.

## 2017-10-09 NOTE — CONSULT NOTE ADULT - PROBLEM SELECTOR RECOMMENDATION 3
[employer] Prince mechanical valve in good position.  Therapeutic INR.  1.  Continue Coumadin so INR is between 2.5 to 3.5.

## 2017-10-09 NOTE — CONSULT NOTE ADULT - PROBLEM SELECTOR RECOMMENDATION 2
Mild acute on chronic systolic CHF by exam and CXR.  1.  Lasix 20 mg IV daily.  2.  Entresto 24-26 mg orally bid.  3.  Continue Coreg.  4.  daily weights.

## 2017-10-09 NOTE — ED ADULT NURSE REASSESSMENT NOTE - NS ED NURSE REASSESS COMMENT FT1
patient rec'd at this itme ptlaert with family at bedside. patient deneies any chest pain of short of breath at this time. patient placed on monitor and willl chart changes

## 2017-10-09 NOTE — H&P ADULT - PROBLEM SELECTOR PLAN 1
resolved, s/p amiodarone  Cardiology Dr. Hutchinson called and case discussed with Dr. Mark ED, will not continue amiodarone at this time  continue home medications: sotalol, carvidalol, entresto  admit to 4T CTU  consider CTICU consult if conditions worsens or arrythmia reoccurs  INR therapeutic

## 2017-10-10 DIAGNOSIS — I10 ESSENTIAL (PRIMARY) HYPERTENSION: ICD-10-CM

## 2017-10-10 LAB
ANION GAP SERPL CALC-SCNC: 10 MMOL/L — SIGNIFICANT CHANGE UP (ref 5–17)
BUN SERPL-MCNC: 14 MG/DL — SIGNIFICANT CHANGE UP (ref 8–20)
CALCIUM SERPL-MCNC: 8.3 MG/DL — LOW (ref 8.6–10.2)
CHLORIDE SERPL-SCNC: 104 MMOL/L — SIGNIFICANT CHANGE UP (ref 98–107)
CO2 SERPL-SCNC: 24 MMOL/L — SIGNIFICANT CHANGE UP (ref 22–29)
CREAT SERPL-MCNC: 0.67 MG/DL — SIGNIFICANT CHANGE UP (ref 0.5–1.3)
GLUCOSE BLDC GLUCOMTR-MCNC: 107 MG/DL — HIGH (ref 70–99)
GLUCOSE BLDC GLUCOMTR-MCNC: 143 MG/DL — HIGH (ref 70–99)
GLUCOSE BLDC GLUCOMTR-MCNC: 177 MG/DL — HIGH (ref 70–99)
GLUCOSE SERPL-MCNC: 211 MG/DL — HIGH (ref 70–115)
INR BLD: 2.21 RATIO — HIGH (ref 0.88–1.16)
POTASSIUM SERPL-MCNC: 4.5 MMOL/L — SIGNIFICANT CHANGE UP (ref 3.5–5.3)
POTASSIUM SERPL-SCNC: 4.5 MMOL/L — SIGNIFICANT CHANGE UP (ref 3.5–5.3)
PROTHROM AB SERPL-ACNC: 24.7 SEC — HIGH (ref 9.8–12.7)
SODIUM SERPL-SCNC: 138 MMOL/L — SIGNIFICANT CHANGE UP (ref 135–145)

## 2017-10-10 PROCEDURE — 99233 SBSQ HOSP IP/OBS HIGH 50: CPT

## 2017-10-10 RX ORDER — ENOXAPARIN SODIUM 100 MG/ML
60 INJECTION SUBCUTANEOUS EVERY 12 HOURS
Qty: 0 | Refills: 0 | Status: DISCONTINUED | OUTPATIENT
Start: 2017-10-10 | End: 2017-10-11

## 2017-10-10 RX ORDER — AMIODARONE HYDROCHLORIDE 400 MG/1
400 TABLET ORAL
Qty: 0 | Refills: 0 | Status: DISCONTINUED | OUTPATIENT
Start: 2017-10-10 | End: 2017-10-11

## 2017-10-10 RX ORDER — ACETAMINOPHEN 500 MG
650 TABLET ORAL EVERY 6 HOURS
Qty: 0 | Refills: 0 | Status: DISCONTINUED | OUTPATIENT
Start: 2017-10-10 | End: 2017-10-11

## 2017-10-10 RX ORDER — FUROSEMIDE 40 MG
20 TABLET ORAL DAILY
Qty: 0 | Refills: 0 | Status: DISCONTINUED | OUTPATIENT
Start: 2017-10-10 | End: 2017-10-11

## 2017-10-10 RX ORDER — WARFARIN SODIUM 2.5 MG/1
1 TABLET ORAL DAILY
Qty: 0 | Refills: 0 | Status: DISCONTINUED | OUTPATIENT
Start: 2017-10-10 | End: 2017-10-11

## 2017-10-10 RX ADMIN — ATORVASTATIN CALCIUM 20 MILLIGRAM(S): 80 TABLET, FILM COATED ORAL at 22:22

## 2017-10-10 RX ADMIN — Medication 650 MILLIGRAM(S): at 13:56

## 2017-10-10 RX ADMIN — CARVEDILOL PHOSPHATE 3.12 MILLIGRAM(S): 80 CAPSULE, EXTENDED RELEASE ORAL at 06:06

## 2017-10-10 RX ADMIN — ENOXAPARIN SODIUM 60 MILLIGRAM(S): 100 INJECTION SUBCUTANEOUS at 22:22

## 2017-10-10 RX ADMIN — SACUBITRIL AND VALSARTAN 1 TABLET(S): 24; 26 TABLET, FILM COATED ORAL at 17:36

## 2017-10-10 RX ADMIN — Medication 80 MILLIGRAM(S): at 06:06

## 2017-10-10 RX ADMIN — Medication 1 DROP(S): at 11:51

## 2017-10-10 RX ADMIN — DORZOLAMIDE HYDROCHLORIDE 1 DROP(S): 20 SOLUTION/ DROPS OPHTHALMIC at 11:51

## 2017-10-10 RX ADMIN — LATANOPROST 1 DROP(S): 0.05 SOLUTION/ DROPS OPHTHALMIC; TOPICAL at 22:18

## 2017-10-10 RX ADMIN — AMIODARONE HYDROCHLORIDE 400 MILLIGRAM(S): 400 TABLET ORAL at 17:35

## 2017-10-10 RX ADMIN — Medication 20 MILLIGRAM(S): at 12:27

## 2017-10-10 RX ADMIN — AMIODARONE HYDROCHLORIDE 400 MILLIGRAM(S): 400 TABLET ORAL at 06:05

## 2017-10-10 RX ADMIN — Medication 81 MILLIGRAM(S): at 11:50

## 2017-10-10 RX ADMIN — Medication 75 MICROGRAM(S): at 06:06

## 2017-10-10 RX ADMIN — WARFARIN SODIUM 1 MILLIGRAM(S): 2.5 TABLET ORAL at 22:22

## 2017-10-10 RX ADMIN — CARVEDILOL PHOSPHATE 3.12 MILLIGRAM(S): 80 CAPSULE, EXTENDED RELEASE ORAL at 17:34

## 2017-10-10 RX ADMIN — Medication 650 MILLIGRAM(S): at 14:34

## 2017-10-10 RX ADMIN — AMIODARONE HYDROCHLORIDE 400 MILLIGRAM(S): 400 TABLET ORAL at 00:00

## 2017-10-10 RX ADMIN — SACUBITRIL AND VALSARTAN 1 TABLET(S): 24; 26 TABLET, FILM COATED ORAL at 06:06

## 2017-10-10 NOTE — PROGRESS NOTE ADULT - PROBLEM SELECTOR PLAN 1
No recurrent VT on Amiodarone 400 mg orally tid.  1.  Reduce Amiodarone 400 mg orally bid.    2.  If patient has no recurrent VT over next 24 hours, she may be discharged to home in AM on Amiodarone 400 mg orally bid.

## 2017-10-10 NOTE — PROGRESS NOTE ADULT - SUBJECTIVE AND OBJECTIVE BOX
Patient is a 67y old  Female who presents with a chief complaint of chest pain and palpitations (09 Oct 2017 01:46)        PAST MEDICAL & SURGICAL HISTORY:  Glaucoma  Sarcoid  AICD (automatic cardioverter/defibrillator) present  Mitral valve disease  Cataract: bialt  HTN (hypertension)  Diabetes  Hypothyroid  H/O mitral valve replacement  Elective surgery: back surgury, mitral valve replcement. AICD      Summary of admission HPI:            CXR:    other Radiology studies:      MEDICATIONS  (STANDING):  amiodarone    Tablet 400 milliGRAM(s) Oral three times a day  aspirin enteric coated 81 milliGRAM(s) Oral daily  atorvastatin 20 milliGRAM(s) Oral at bedtime  carvedilol 3.125 milliGRAM(s) Oral every 12 hours  dextrose 5%. 1000 milliLiter(s) (50 mL/Hr) IV Continuous <Continuous>  dextrose 50% Injectable 12.5 Gram(s) IV Push once  dextrose 50% Injectable 25 Gram(s) IV Push once  dextrose 50% Injectable 25 Gram(s) IV Push once  dorzolamide 2% Ophthalmic Solution 1 Drop(s) Both EYES daily  furosemide   Injectable 20 milliGRAM(s) IV Push daily  influenza   Vaccine 0.5 milliLiter(s) IntraMuscular once  insulin lispro (HumaLOG) corrective regimen sliding scale   SubCutaneous three times a day before meals  latanoprost 0.005% Ophthalmic Solution 1 Drop(s) Both EYES at bedtime  levothyroxine 75 MICROGram(s) Oral daily  prednisoLONE acetate 1% Suspension 1 Drop(s) Both EYES daily  sacubitril 24 mG/valsartan 26 mG 1 Tablet(s) Oral two times a day  sotalol 80 milliGRAM(s) Oral two times a day  warfarin 1 milliGRAM(s) Oral daily    MEDICATIONS  (PRN):  dextrose Gel 1 Dose(s) Oral once PRN Blood Glucose LESS THAN 70 milliGRAM(s)/deciliter  glucagon  Injectable 1 milliGRAM(s) IntraMuscular once PRN Glucose LESS THAN 70 milligrams/deciliter  nitroglycerin    2% Ointment 0.5 Inch(s) Transdermal every 6 hours PRN chest pain      FAMILY HISTORY:  No pertinent family history in first degree relatives      SOCIAL HISTORY:    CIGARETTES:    ALCOHOL:    REVIEW OF SYSTEMS:    CONSTITUTIONAL: No fever, weight loss, chills, shakes, or fatigue  EYES: No eye pain, visual disturbances, or discharge  ENMT:  No difficulty hearing, tinnitus, vertigo; No sinus or throat pain  NECK: No pain or stiffness  BREASTS: No pain, masses, or nipple discharge  RESPIRATORY: No cough, wheezing, hemoptysis, or shortness of breath  CARDIOVASCULAR: No chest pain, dyspnea, palpitations, dizziness, syncope, paroxysmal nocturnal dyspnea, orthopnea, or arm or leg swelling  GASTROINTESTINAL: No abdominal  or epigastric pain, nausea, vomiting, hematemesis, diarrhea, constipation, melena or bright red blood.  GENITOURINARY: No dysuria, nocturia, hematuria, or urinary incontinence  NEUROLOGICAL: No headaches, memory loss, slurred speech, limb weakness, loss of strength, numbness, or tremors  SKIN: No itching, burning, rashes, or lesions   LYMPH NODES: No enlarged glands  ENDOCRINE: No heat or cold intolerance, or hair loss  MUSCULOSKELETAL: No joint pain or swelling, muscle, back, or extremity pain  PSYCHIATRIC: No depression, anxiety, or difficulty sleeping  HEME/LYMPH: No easy bruising or bleeding gums  ALLERY AND IMMUNOLOGIC: No hives or rash.      Vital Signs Last 24 Hrs  T(C): 36.5 (10 Oct 2017 10:00), Max: 37.1 (09 Oct 2017 13:12)  T(F): 97.7 (10 Oct 2017 10:00), Max: 98.7 (09 Oct 2017 13:12)  HR: 60 (10 Oct 2017 10:00) (60 - 75)  BP: 94/835 (10 Oct 2017 10:00) (94/835 - 113/55)  BP(mean): --  RR: 16 (10 Oct 2017 10:00) (16 - 22)  SpO2: 97% (10 Oct 2017 10:00) (95% - 97%)    PHYSICAL EXAM:    GENERAL: In no apparent distress, well nourished, and hydrated.  HEAD:  Atraumatic, Normocephalic  EYES: EOMI, PERRLA, conjunctiva and sclera clear  ENMT: No tonsillar erythema, exudates, or enlargement; Moist mucous membranes, Good dentition, No lesions  NECK: Supple and normal thyroid.  No JVD or carotid bruit.  Carotid pulse is 2+ bilaterally.  HEART: Regular rate and rhythm; No murmurs, rubs, or gallops.  PULMONARY: Clear to auscultation and perfusion.  No rales, wheezing, or rhonchi bilaterally.  ABDOMEN: Soft, Nontender, Nondistended; Bowel sounds present  EXTREMITIES: 1/2 + lower extremity edema.  No clubbing, cyanosis.  NEUROLOGICAL: Grossly nonfocal          INTERPRETATION OF TELEMETRY: Normal DDD pacing with no VT.          I&O's Detail    09 Oct 2017 07:01  -  10 Oct 2017 07:00  --------------------------------------------------------  IN:    Oral Fluid: 120 mL  Total IN: 120 mL    OUT:  Total OUT: 0 mL    Total NET: 120 mL      10 Oct 2017 07:01  -  10 Oct 2017 10:41  --------------------------------------------------------  IN:  Total IN: 0 mL    OUT:    Voided: 250 mL  Total OUT: 250 mL    Total NET: -250 mL          LABS:                        11.3   9.3   )-----------( 140      ( 09 Oct 2017 08:08 )             34.7     10-10    138  |  104  |  14.0  ----------------------------<  211<H>  4.5   |  24.0  |  0.67    Ca    8.3<L>      10 Oct 2017 10:07  Phos  2.6     10-09  Mg     2.4     10-09    TPro  6.6  /  Alb  3.9  /  TBili  0.7  /  DBili  x   /  AST  21  /  ALT  15  /  AlkPhos  106  10-09        PT/INR - ( 10 Oct 2017 10:07 )   PT: 24.7 sec;   INR: 2.21 ratio         PTT - ( 09 Oct 2017 08:08 )  PTT:47.0 sec  Urinalysis Basic - ( 09 Oct 2017 11:27 )    Color: Yellow / Appearance: Clear / S.010 / pH: x  Gluc: x / Ketone: Negative  / Bili: Negative / Urobili: Negative mg/dL   Blood: x / Protein: Negative mg/dL / Nitrite: Negative   Leuk Esterase: Negative / RBC: Negative /HPF / WBC 0-2   Sq Epi: x / Non Sq Epi: x / Bacteria: x      BNP  Daily     Daily

## 2017-10-10 NOTE — PROGRESS NOTE ADULT - PROBLEM SELECTOR PLAN 3
1.  Change Lasix 20 mg IV daily to PO daily.
dietary advice, nutritionist, dietitian, compliance with medications
known EF 25% - on entresto

## 2017-10-10 NOTE — PROGRESS NOTE ADULT - PROBLEM SELECTOR PROBLEM 6
ACS (acute coronary syndrome)
Type 2 diabetes mellitus with hyperglycemia, without long-term current use of insulin

## 2017-10-10 NOTE — PROGRESS NOTE ADULT - ASSESSMENT
67 y.o. female with h/o systemic sarcoidosis, severe MR and dilated cardiomyopathy with LVEF=25 %, chronic systolic CHF, s/p [employer] mechanical MVR, paroxysmal atrial tachycardia/fibrillation, paroxysmal VT treated with Amiodarone and Sotalol, admitted 10/9/17 with chest pain, dyspnea, palpitations and diagnosed with sustained monomorphic VT with HRs of 138 bpm that did not recieve ICD shocks due to VT HR falling below 145 bpm.  The VT terminated with IV magnesium and Amiodarone.  Patients basilar rales resolved with IV Lasix.
I examined patient who is found lying comfortably in bed in NAD and not complaining of shortness of breath, dyspnea, orthopnea, N/V, headache, palpitations, chest pain, dizziness or any other pertinent acute problems. Patients  is at bedside. Patient states she was fine yesterday until she decided to stand on stool to clean her fire place and at that time her heart rate began to increase until it reached the upper 140's and lower 150's BPM. Her  called 911 and she was brought to [facility] for evaluation. At this time the patient is very comfortable and awaiting the cardiologist to arrive to evaluate her this morning.  Pe: focused exam  Heent Normocephalic, atraumatic, Perrla  Neck supple, no adenopathy, trachea mid-line  Chest symm and equal on expansion  Heart RR @60 BPM, no M noted on auscultation  Abdomen soft, no guarding, tenderness, or masses noted on exam  Extremities No edema, pulses 2+ upper and lowers  Skin clear no rashes or lesions, lacerations or contusions.  Neuro intact, oriented x4. no deficits noted on gross exam
66 y/o Female with PMHx of PPM/AICD with hx of dilated CMP, [employer] Prince's Mitral valve replacement (2005) on VKA, open heart surgery (2005), DMII, HTN, HLD, vertigo and found to have recurrent VT

## 2017-10-10 NOTE — PROGRESS NOTE ADULT - SUBJECTIVE AND OBJECTIVE BOX
CC: chest pain and palpitations (09 Oct 2017 01:46)    HPI: 68 y/o Female with PMHx of PPM/AICD with hx of dilated CMP, [employer] Mitral valve replacement () on VKA, open heart surgery (), DMII, HTN, HLD, vertigo who presented to ED via ambulance after experiencing chest pain at home while cleaning her fireplace. Pt describes the pain as centrally located in chest, heavy pressure/tightness in nature, non radiating, no a/a factors, associated with light headedness, palpitations, nausea, and SOB. Pt states she took her heart rate and it was in high 130's so her daughter called cardiologist and EMS. Daughter and  at bedside to provide additional history and confirm series of events as described pt patient. Pt denied any diaphoresis, abdominal pain, vomiting, diarrhea/constipation, gu sx, radiation to left arm/jaw, dizziness, loss of sensation, visual or speech changes, or weakness.   Of note, pt reports compliance with all medications except synthroid which she states she has missed 'a few' days worth of. (09 Oct 2017 01:46)    INTERVAL HPI/OVERNIGHT EVENTS: none, feels better, no VT  Other ROS reviewed and neg     Vital Signs Last 24 Hrs  T(C): 36.5 (10 Oct 2017 10:00), Max: 36.8 (09 Oct 2017 16:37)  T(F): 97.7 (10 Oct 2017 10:00), Max: 98.3 (09 Oct 2017 16:37)  HR: 60 (10 Oct 2017 10:00) (60 - 75)  BP: 90/58 (10 Oct 2017 13:03) (90/58 - 113/55)  RR: 16 (10 Oct 2017 10:00) (16 - 22)  SpO2: 97% (10 Oct 2017 10:00) (95% - 97%)  I&O's Detail    09 Oct 2017 07:01  -  10 Oct 2017 07:00  --------------------------------------------------------  IN:    Oral Fluid: 120 mL  Total IN: 120 mL    OUT:  Total OUT: 0 mL    Total NET: 120 mL      10 Oct 2017 07:01  -  10 Oct 2017 14:20  --------------------------------------------------------  IN:    Oral Fluid: 385 mL  Total IN: 385 mL    OUT:    Voided: 250 mL  Total OUT: 250 mL    Total NET: 135 mL    CARDIAC MARKERS ( 09 Oct 2017 08:08 )  x     / 0.04 ng/mL / x     / x     / x      CARDIAC MARKERS ( 09 Oct 2017 03:53 )  x     / 0.05 ng/mL / x     / x     / x      CARDIAC MARKERS ( 08 Oct 2017 21:28 )  x     / <0.01 ng/mL / x     / x     / x                           11.3   9.3   )-----------( 140      ( 09 Oct 2017 08:08 )             34.7     10 Oct 2017 10:07    138    |  104    |  14.0   ----------------------------<  211    4.5     |  24.0   |  0.67     Ca    8.3        10 Oct 2017 10:07  Phos  2.6       09 Oct 2017 08:08  Mg     2.4       09 Oct 2017 08:08    TPro  6.6    /  Alb  3.9    /  TBili  0.7    /  DBili  x      /  AST  21     /  ALT  15     /  AlkPhos  106    09 Oct 2017 08:08    PT/INR - ( 10 Oct 2017 10:07 )   PT: 24.7 sec;   INR: 2.21 ratio       PTT - ( 09 Oct 2017 08:08 )  PTT:47.0 sec  CAPILLARY BLOOD GLUCOSE  130 (10 Oct 2017 07:40)  135 (09 Oct 2017 21:59)  138 (09 Oct 2017 17:30)    POCT Blood Glucose.: 143 mg/dL (10 Oct 2017 11:45)    LIVER FUNCTIONS - ( 09 Oct 2017 08:08 )  Alb: 3.9 g/dL / Pro: 6.6 g/dL / ALK PHOS: 106 U/L / ALT: 15 U/L / AST: 21 U/L / GGT: x         Urinalysis Basic - ( 09 Oct 2017 11:27 )  Color: Yellow / Appearance: Clear / S.010 / pH: x  Gluc: x / Ketone: Negative  / Bili: Negative / Urobili: Negative mg/dL   Blood: x / Protein: Negative mg/dL / Nitrite: Negative   Leuk Esterase: Negative / RBC: Negative /HPF / WBC 0-2   Sq Epi: x / Non Sq Epi: x / Bacteria: x    Hemoglobin A1C, Whole Blood: 6.1 % (10-09-17 @ 03:53)    MEDICATIONS  (STANDING):  amiodarone    Tablet 400 milliGRAM(s) Oral two times a day  aspirin enteric coated 81 milliGRAM(s) Oral daily  atorvastatin 20 milliGRAM(s) Oral at bedtime  carvedilol 3.125 milliGRAM(s) Oral every 12 hours  dextrose 5%. 1000 milliLiter(s) (50 mL/Hr) IV Continuous <Continuous>  dextrose 50% Injectable 12.5 Gram(s) IV Push once  dextrose 50% Injectable 25 Gram(s) IV Push once  dextrose 50% Injectable 25 Gram(s) IV Push once  dorzolamide 2% Ophthalmic Solution 1 Drop(s) Both EYES daily  enoxaparin Injectable 60 milliGRAM(s) SubCutaneous every 12 hours  furosemide    Tablet 20 milliGRAM(s) Oral daily  furosemide   Injectable 20 milliGRAM(s) IV Push daily  influenza   Vaccine 0.5 milliLiter(s) IntraMuscular once  insulin lispro (HumaLOG) corrective regimen sliding scale   SubCutaneous three times a day before meals  latanoprost 0.005% Ophthalmic Solution 1 Drop(s) Both EYES at bedtime  levothyroxine 75 MICROGram(s) Oral daily  prednisoLONE acetate 1% Suspension 1 Drop(s) Both EYES daily  sacubitril 24 mG/valsartan 26 mG 1 Tablet(s) Oral two times a day  warfarin 1 milliGRAM(s) Oral daily    MEDICATIONS  (PRN):  acetaminophen   Tablet. 650 milliGRAM(s) Oral every 6 hours PRN Moderate Pain (4 - 6)  dextrose Gel 1 Dose(s) Oral once PRN Blood Glucose LESS THAN 70 milliGRAM(s)/deciliter  glucagon  Injectable 1 milliGRAM(s) IntraMuscular once PRN Glucose LESS THAN 70 milligrams/deciliter  nitroglycerin    2% Ointment 0.5 Inch(s) Transdermal every 6 hours PRN chest pain      RADIOLOGY & ADDITIONAL TESTS: personally visualized    PHYSICAL EXAM:    General: obese female in no acute distress  Eyes: PERRLA, EOMI; conjunctiva and sclera clear  Head: Normocephalic; atraumatic  ENMT: No nasal discharge; airway clear  Neck: Supple; non tender; no masses  Respiratory: No wheezes, rales or rhonchi  Cardiovascular: Regular rate and rhythm. S1 and S2, + metallic click  Gastrointestinal: Soft non-tender non-distended; Normal bowel sounds  Genitourinary: No costovertebral angle tenderness  Extremities: decreased range of motion, No clubbing, cyanosis or edema  Vascular: Peripheral pulses palpable 2+ bilaterally  Neurological: Alert and oriented x4  Skin: Warm and dry.   Musculoskeletal: Normal tone, without deformities  Psychiatric: Cooperative and appropriate

## 2017-10-10 NOTE — PROGRESS NOTE ADULT - PROBLEM SELECTOR PROBLEM 5
S/P mitral valve replacement
Type 2 diabetes mellitus with hyperglycemia, without long-term current use of insulin
Acquired hypothyroidism

## 2017-10-10 NOTE — PROGRESS NOTE ADULT - PROBLEM SELECTOR PROBLEM 2
AICD (automatic cardioverter/defibrillator) present
Acquired hypothyroidism
S/P mitral valve replacement

## 2017-10-10 NOTE — PROGRESS NOTE ADULT - PROBLEM SELECTOR PROBLEM 3
Anemia, unspecified type
Congestive heart failure, NYHA class 3, acute on chronic, systolic
Dilated cardiomyopathy

## 2017-10-10 NOTE — PROGRESS NOTE ADULT - PROBLEM SELECTOR PLAN 2
ICD interrogation done 10/9/17 demonstrated no VT detections at HRs greater than 145 bpm.  Patient's maximal HRs on the histogram were in the range of the 100s.    1,  VT detection reprogrammed from 145 to 130 bpm.
compliance with thyroid medications
metallic - INR 2.2 - add lovenox until INR>2.5 with goal 2.5-3.5

## 2017-10-10 NOTE — PROGRESS NOTE ADULT - PROBLEM SELECTOR PLAN 5
INRs should be maintained between 2.5 and 3.5.
meds compliance, diet, exercise as tolerated, dietitian and nutritionist advice
synthroid

## 2017-10-11 ENCOUNTER — TRANSCRIPTION ENCOUNTER (OUTPATIENT)
Age: 67
End: 2017-10-11

## 2017-10-11 VITALS
SYSTOLIC BLOOD PRESSURE: 99 MMHG | DIASTOLIC BLOOD PRESSURE: 54 MMHG | HEART RATE: 60 BPM | RESPIRATION RATE: 16 BRPM | TEMPERATURE: 98 F

## 2017-10-11 LAB
ANION GAP SERPL CALC-SCNC: 11 MMOL/L — SIGNIFICANT CHANGE UP (ref 5–17)
BUN SERPL-MCNC: 16 MG/DL — SIGNIFICANT CHANGE UP (ref 8–20)
CALCIUM SERPL-MCNC: 8.9 MG/DL — SIGNIFICANT CHANGE UP (ref 8.6–10.2)
CHLORIDE SERPL-SCNC: 104 MMOL/L — SIGNIFICANT CHANGE UP (ref 98–107)
CO2 SERPL-SCNC: 24 MMOL/L — SIGNIFICANT CHANGE UP (ref 22–29)
CREAT SERPL-MCNC: 0.78 MG/DL — SIGNIFICANT CHANGE UP (ref 0.5–1.3)
GLUCOSE BLDC GLUCOMTR-MCNC: 141 MG/DL — HIGH (ref 70–99)
GLUCOSE BLDC GLUCOMTR-MCNC: 151 MG/DL — HIGH (ref 70–99)
GLUCOSE SERPL-MCNC: 121 MG/DL — HIGH (ref 70–115)
HCT VFR BLD CALC: 35.3 % — LOW (ref 37–47)
HGB BLD-MCNC: 11.7 G/DL — LOW (ref 12–16)
INR BLD: 2.09 RATIO — HIGH (ref 0.88–1.16)
MCHC RBC-ENTMCNC: 30.5 PG — SIGNIFICANT CHANGE UP (ref 27–31)
MCHC RBC-ENTMCNC: 33.1 G/DL — SIGNIFICANT CHANGE UP (ref 32–36)
MCV RBC AUTO: 92.2 FL — SIGNIFICANT CHANGE UP (ref 81–99)
PLATELET # BLD AUTO: 157 K/UL — SIGNIFICANT CHANGE UP (ref 150–400)
POTASSIUM SERPL-MCNC: 4.1 MMOL/L — SIGNIFICANT CHANGE UP (ref 3.5–5.3)
POTASSIUM SERPL-SCNC: 4.1 MMOL/L — SIGNIFICANT CHANGE UP (ref 3.5–5.3)
PROTHROM AB SERPL-ACNC: 23.3 SEC — HIGH (ref 9.8–12.7)
RBC # BLD: 3.83 M/UL — LOW (ref 4.4–5.2)
RBC # FLD: 14.1 % — SIGNIFICANT CHANGE UP (ref 11–15.6)
SODIUM SERPL-SCNC: 139 MMOL/L — SIGNIFICANT CHANGE UP (ref 135–145)
WBC # BLD: 8.1 K/UL — SIGNIFICANT CHANGE UP (ref 4.8–10.8)
WBC # FLD AUTO: 8.1 K/UL — SIGNIFICANT CHANGE UP (ref 4.8–10.8)

## 2017-10-11 PROCEDURE — 99291 CRITICAL CARE FIRST HOUR: CPT | Mod: 25

## 2017-10-11 PROCEDURE — 84443 ASSAY THYROID STIM HORMONE: CPT

## 2017-10-11 PROCEDURE — 80053 COMPREHEN METABOLIC PANEL: CPT

## 2017-10-11 PROCEDURE — 90686 IIV4 VACC NO PRSV 0.5 ML IM: CPT

## 2017-10-11 PROCEDURE — 96374 THER/PROPH/DIAG INJ IV PUSH: CPT

## 2017-10-11 PROCEDURE — 93005 ELECTROCARDIOGRAM TRACING: CPT

## 2017-10-11 PROCEDURE — 83735 ASSAY OF MAGNESIUM: CPT

## 2017-10-11 PROCEDURE — 82962 GLUCOSE BLOOD TEST: CPT

## 2017-10-11 PROCEDURE — 36415 COLL VENOUS BLD VENIPUNCTURE: CPT

## 2017-10-11 PROCEDURE — 96375 TX/PRO/DX INJ NEW DRUG ADDON: CPT

## 2017-10-11 PROCEDURE — 83874 ASSAY OF MYOGLOBIN: CPT

## 2017-10-11 PROCEDURE — 83880 ASSAY OF NATRIURETIC PEPTIDE: CPT

## 2017-10-11 PROCEDURE — 99239 HOSP IP/OBS DSCHRG MGMT >30: CPT

## 2017-10-11 PROCEDURE — 85730 THROMBOPLASTIN TIME PARTIAL: CPT

## 2017-10-11 PROCEDURE — 84100 ASSAY OF PHOSPHORUS: CPT

## 2017-10-11 PROCEDURE — 85027 COMPLETE CBC AUTOMATED: CPT

## 2017-10-11 PROCEDURE — 84480 ASSAY TRIIODOTHYRONINE (T3): CPT

## 2017-10-11 PROCEDURE — 80061 LIPID PANEL: CPT

## 2017-10-11 PROCEDURE — 80048 BASIC METABOLIC PNL TOTAL CA: CPT

## 2017-10-11 PROCEDURE — 71045 X-RAY EXAM CHEST 1 VIEW: CPT

## 2017-10-11 PROCEDURE — 84436 ASSAY OF TOTAL THYROXINE: CPT

## 2017-10-11 PROCEDURE — 83036 HEMOGLOBIN GLYCOSYLATED A1C: CPT

## 2017-10-11 PROCEDURE — 81001 URINALYSIS AUTO W/SCOPE: CPT

## 2017-10-11 PROCEDURE — 84484 ASSAY OF TROPONIN QUANT: CPT

## 2017-10-11 PROCEDURE — 85610 PROTHROMBIN TIME: CPT

## 2017-10-11 RX ORDER — AMIODARONE HYDROCHLORIDE 400 MG/1
2 TABLET ORAL
Qty: 0 | Refills: 0 | DISCHARGE
Start: 2017-10-11 | End: 2017-10-25

## 2017-10-11 RX ORDER — AMIODARONE HYDROCHLORIDE 400 MG/1
1 TABLET ORAL
Qty: 0 | Refills: 0 | DISCHARGE
Start: 2017-10-11 | End: 2017-10-25

## 2017-10-11 RX ORDER — ENOXAPARIN SODIUM 100 MG/ML
60 INJECTION SUBCUTANEOUS
Qty: 4 | Refills: 0
Start: 2017-10-11 | End: 2017-10-13

## 2017-10-11 RX ORDER — LEVOTHYROXINE SODIUM 125 MCG
1 TABLET ORAL
Qty: 0 | Refills: 0 | DISCHARGE
Start: 2017-10-11

## 2017-10-11 RX ORDER — AMIODARONE HYDROCHLORIDE 400 MG/1
2 TABLET ORAL
Qty: 56 | Refills: 0
Start: 2017-10-11 | End: 2017-10-25

## 2017-10-11 RX ORDER — LEVOTHYROXINE SODIUM 125 MCG
1 TABLET ORAL
Qty: 0 | Refills: 0 | COMMUNITY

## 2017-10-11 RX ADMIN — DORZOLAMIDE HYDROCHLORIDE 1 DROP(S): 20 SOLUTION/ DROPS OPHTHALMIC at 11:24

## 2017-10-11 RX ADMIN — AMIODARONE HYDROCHLORIDE 400 MILLIGRAM(S): 400 TABLET ORAL at 06:26

## 2017-10-11 RX ADMIN — ENOXAPARIN SODIUM 60 MILLIGRAM(S): 100 INJECTION SUBCUTANEOUS at 10:25

## 2017-10-11 RX ADMIN — SACUBITRIL AND VALSARTAN 1 TABLET(S): 24; 26 TABLET, FILM COATED ORAL at 06:27

## 2017-10-11 RX ADMIN — Medication 75 MICROGRAM(S): at 06:27

## 2017-10-11 RX ADMIN — Medication 20 MILLIGRAM(S): at 06:26

## 2017-10-11 RX ADMIN — CARVEDILOL PHOSPHATE 3.12 MILLIGRAM(S): 80 CAPSULE, EXTENDED RELEASE ORAL at 06:26

## 2017-10-11 RX ADMIN — Medication 1 DROP(S): at 11:24

## 2017-10-11 RX ADMIN — Medication 81 MILLIGRAM(S): at 11:25

## 2017-10-11 RX ADMIN — INFLUENZA VIRUS VACCINE 0.5 MILLILITER(S): 15; 15; 15; 15 SUSPENSION INTRAMUSCULAR at 11:25

## 2017-10-11 NOTE — DISCHARGE NOTE ADULT - CARE PLAN
Principal Discharge DX:	Ventricular tachycardia  Goal:	prevention  Instructions for follow-up, activity and diet:	continue amiodarone, f/u with cardiology for dose adjustment  Secondary Diagnosis:	Dilated cardiomyopathy  Instructions for follow-up, activity and diet:	continue entresto, coreg  Secondary Diagnosis:	Essential hypertension  Instructions for follow-up, activity and diet:	monitor BP  Secondary Diagnosis:	Glaucoma of both eyes, unspecified glaucoma type  Instructions for follow-up, activity and diet:	eye drops  Secondary Diagnosis:	H/O mitral valve replacement  Instructions for follow-up, activity and diet:	on coumadin + lovenox until INR>2.5 - recheck INR on 10/12/17 and stop lovenox if INR>2.5, otherwise recheck 10/13/17 again  Secondary Diagnosis:	Type 2 diabetes mellitus with hyperglycemia, without long-term current use of insulin  Instructions for follow-up, activity and diet:	resume metformin as DMII controlled  Secondary Diagnosis:	Sarcoidosis  Instructions for follow-up, activity and diet:	monitoring as outpt

## 2017-10-11 NOTE — DISCHARGE NOTE ADULT - SECONDARY DIAGNOSIS.
Dilated cardiomyopathy Essential hypertension Glaucoma of both eyes, unspecified glaucoma type H/O mitral valve replacement Type 2 diabetes mellitus with hyperglycemia, without long-term current use of insulin Sarcoidosis

## 2017-10-11 NOTE — DISCHARGE NOTE ADULT - MEDICATION SUMMARY - MEDICATIONS TO TAKE
I will START or STAY ON the medications listed below when I get home from the hospital:    aspirin 81 mg oral delayed release tablet  -- 1 tab(s) by mouth once a day  -- Indication: For CAD    Entresto 24 mg-26 mg oral tablet  -- 1 tab(s) by mouth 2 times a day  -- Indication: For CMP    amiodarone 200 mg oral tablet  -- 2 tab(s) by mouth 2 times a day  -- Indication: For VT    enoxaparin 60 mg/0.6 mL injectable solution  -- 60 milligram(s) injectable every 12 hours   -- It is very important that you take or use this exactly as directed.  Do not skip doses or discontinue unless directed by your doctor.    -- Indication: For Coagulopathy    warfarin 1 mg oral tablet  -- 1 tab(s) by mouth once a day  -- Indication: For Metallic MVR    metFORMIN 500 mg oral tablet  -- 1 tab(s) by mouth 2 times a day  -- Indication: For DMII    atorvastatin 20 mg oral tablet  -- 1 tab(s) by mouth once a day  -- Indication: For HLD    carvedilol 3.125 mg oral tablet  -- 1 tab(s) by mouth 2 times a day  -- Indication: For HTN (hypertension)    Pred Forte 1% ophthalmic suspension  --  to each affected eye once a day  -- Indication: For Glaucoma of both eyes, unspecified glaucoma type    dorzolamide 2% ophthalmic solution  -- 1 drop(s) to each affected eye once a day  -- Indication: For Glaucoma of both eyes, unspecified glaucoma type    levothyroxine 75 mcg (0.075 mg) oral tablet  -- 1 tab(s) by mouth once a day  -- Indication: For Hypothyroidism

## 2017-10-11 NOTE — DISCHARGE NOTE ADULT - PLAN OF CARE
prevention continue amiodarone, f/u with cardiology for dose adjustment continue entresto, coreg monitor BP eye drops on coumadin + lovenox until INR>2.5 - recheck INR on 10/12/17 and stop lovenox if INR>2.5, otherwise recheck 10/13/17 again resume metformin as DMII controlled monitoring as outpt

## 2017-10-11 NOTE — DISCHARGE NOTE ADULT - CARE PROVIDER_API CALL
[facility]  Phone: (   )    -  Fax: (   )    -    Fany Barfield), Cardiac Electrophysiology; Cardiovascular Disease; Internal Medicine  [address]  [address] [place] [address]  Phone: [phone]  Fax: [phone]

## 2017-10-11 NOTE — DISCHARGE NOTE ADULT - PROVIDER TOKENS
FREE:[LAST:[Reo],PHONE:[(   )    -],FAX:[(   )    -],ADDRESS:[[facility]]],TOKEN:'[id number]'

## 2017-10-11 NOTE — DISCHARGE NOTE ADULT - MEDICATION SUMMARY - MEDICATIONS TO STOP TAKING
I will STOP taking the medications listed below when I get home from the hospital:    Janumet 50 mg-500 mg oral tablet  -- 1 tab(s) by mouth 2 times a day    enalapril 2.5 mg oral tablet  -- 1 tab(s) by mouth once a day    sotalol 80 mg oral tablet  -- 1 tab(s) by mouth 2 times a day    meclizine 12.5 mg oral tablet  -- 1 tab(s) by mouth once a day, As needed, Dizziness    levothyroxine 75 mcg (0.075 mg) oral tablet  -- 1 tab(s) by mouth once a day

## 2017-10-11 NOTE — DISCHARGE NOTE ADULT - HOSPITAL COURSE
CC: chest pain and palpitations (09 Oct 2017 01:46)    HPI: 68 y/o Female with PMHx of PPM/AICD with hx of dilated CMP, [employer] Mitral valve replacement (2005) on VKA, open heart surgery (2005), DMII, HTN, HLD, vertigo who presented to ED via ambulance after experiencing chest pain at home while cleaning her fireplace. Pt describes the pain as centrally located in chest, heavy pressure/tightness in nature, non radiating, no a/a factors, associated with light headedness, palpitations, nausea, and SOB. Pt states she took her heart rate and it was in high 130's so her daughter called cardiologist and EMS. Daughter and  at bedside to provide additional history and confirm series of events as described pt patient. Pt denied any diaphoresis, abdominal pain, vomiting, diarrhea/constipation, gu sx, radiation to left arm/jaw, dizziness, loss of sensation, visual or speech changes, or weakness.   Of note, pt reports compliance with all medications except synthroid which she states she has missed 'a few' days worth of. (09 Oct 2017 01:46)    INTERVAL HPI/OVERNIGHT EVENTS: none, feels better, no VT, episode of sundowning last night  Other ROS reviewed and neg     Vital Signs Last 24 Hrs  T(C): 36.3 (10 Oct 2017 17:22), Max: 36.5 (10 Oct 2017 10:00)  T(F): 97.4 (10 Oct 2017 17:22), Max: 97.7 (10 Oct 2017 10:00)  HR: 60 (11 Oct 2017 06:10) (60 - 60)  BP: 106/64 (11 Oct 2017 06:10) (90/58 - 111/67)  RR: 16 (11 Oct 2017 06:10) (16 - 17)  SpO2: 97% (11 Oct 2017 06:10) (97% - 97%)                       11.7   8.1   )-----------( 157      ( 11 Oct 2017 05:42 )             35.3   11 Oct 2017 05:42  139    |  104    |  16.0   ----------------------------<  121    4.1     |  24.0   |  0.78   Ca    8.9        11 Oct 2017 05:42  PT/INR - ( 11 Oct 2017 05:42 )   PT: 23.3 sec;   INR: 2.09 ratio    Hemoglobin A1C, Whole Blood: 6.1 % (10-09-17 @ 03:53)  MEDICATIONS reviewed  RADIOLOGY & ADDITIONAL TESTS: personally visualized    PHYSICAL EXAM:  General: obese female in no acute distress  Eyes: PERRLA, EOMI; conjunctiva and sclera clear  Head: Normocephalic; atraumatic  ENMT: No nasal discharge; airway clear  Neck: Supple; non tender; no masses  Respiratory: No wheezes, rales or rhonchi  Cardiovascular: Regular rate and rhythm. S1 and S2, + metallic click  Gastrointestinal: Soft non-tender non-distended; Normal bowel sounds  Genitourinary: No costovertebral angle tenderness  Extremities: decreased range of motion, No clubbing, cyanosis or edema  Vascular: Peripheral pulses palpable 2+ bilaterally  Neurological: Alert and oriented x4  Skin: Warm and dry.   Musculoskeletal: Normal tone, without deformities  Psychiatric: Cooperative and appropriate    Assessment and Plan:   · Assessment	  68 y/o Female with PMHx of PPM/AICD with hx of dilated CMP, [employer] Prince's Mitral valve replacement (2005) on VKA, open heart surgery (2005), DMII, HTN, HLD, vertigo and found to have recurrent VT   Problem/Plan - 1:  ·  Problem: Ventricular tachycardia.  Plan: off sotalol and on amiodarone - as no events in last 48 hours will DC home.    Problem/Plan - 2:  ·  Problem: S/P mitral valve replacement.  Plan: metallic - INR 2.09 - continue lovenox until INR>2.5 with goal 2.5-3.5.   Problem/Plan - 3:  ·  Problem: Dilated cardiomyopathy.  Plan: known EF 25% - on entresto.    Problem/Plan - 4:  ·  Problem: Essential hypertension.  Plan: monitor BP.    Problem/Plan - 5:  ·  Problem: Acquired hypothyroidism.  Plan: synthroid.    Problem/Plan - 6:  Problem: Type 2 diabetes mellitus with hyperglycemia, without long-term current use of insulin. Plan: well controlled with HgbA1C 6.1.  - resume metformin   Problem/Plan - 7:  ·  Problem: Sarcoidosis.  Plan: lung dx.     Attending Attestation:   DC home today  65 minutes spent on total encounter; more than 50% of the visit was spent counseling and/or coordinating care by the attending physician.   Plan discussed with Dr. Wolfe.

## 2017-10-11 NOTE — DISCHARGE NOTE ADULT - PATIENT PORTAL LINK FT
“You can access the [facility] Patient Portal, offered by [facility], by registering with the following website: [email or web address]”

## 2017-10-11 NOTE — DISCHARGE NOTE ADULT - IF YOU ARE A SMOKER, IT IS IMPORTANT FOR YOUR HEALTH TO STOP SMOKING. PLEASE BE AWARE THAT SECOND HAND SMOKE IS ALSO HARMFUL.
no motor function loss/no bladder dysfunction/no difficulty bearing weight/no neck tenderness/no numbness/no fatigue/no tingling/no bowel dysfunction/no constipation
Statement Selected

## 2018-01-16 ENCOUNTER — OTHER (OUTPATIENT)
Age: 68
End: 2018-01-16

## 2018-12-05 ENCOUNTER — APPOINTMENT (OUTPATIENT)
Dept: PULMONOLOGY | Facility: CLINIC | Age: 68
End: 2018-12-05
Payer: MEDICARE

## 2018-12-05 VITALS
SYSTOLIC BLOOD PRESSURE: 112 MMHG | HEART RATE: 77 BPM | WEIGHT: 133 LBS | DIASTOLIC BLOOD PRESSURE: 60 MMHG | OXYGEN SATURATION: 98 % | BODY MASS INDEX: 26.86 KG/M2

## 2018-12-05 DIAGNOSIS — I27.81 COR PULMONALE (CHRONIC): ICD-10-CM

## 2018-12-05 PROCEDURE — 99214 OFFICE O/P EST MOD 30 MIN: CPT | Mod: 25

## 2018-12-05 PROCEDURE — 94010 BREATHING CAPACITY TEST: CPT

## 2018-12-05 PROCEDURE — 85018 HEMOGLOBIN: CPT | Mod: QW

## 2018-12-05 PROCEDURE — 94727 GAS DIL/WSHOT DETER LNG VOL: CPT

## 2018-12-05 PROCEDURE — 94729 DIFFUSING CAPACITY: CPT

## 2018-12-05 RX ORDER — FOLIC ACID 1 MG/1
1 TABLET ORAL
Refills: 0 | Status: ACTIVE | COMMUNITY

## 2018-12-05 RX ORDER — FUROSEMIDE 20 MG/1
20 TABLET ORAL
Refills: 0 | Status: ACTIVE | COMMUNITY

## 2018-12-11 ENCOUNTER — FORM ENCOUNTER (OUTPATIENT)
Age: 68
End: 2018-12-11

## 2018-12-12 ENCOUNTER — APPOINTMENT (OUTPATIENT)
Dept: CT IMAGING | Facility: CLINIC | Age: 68
End: 2018-12-12
Payer: MEDICARE

## 2018-12-12 ENCOUNTER — OUTPATIENT (OUTPATIENT)
Dept: OUTPATIENT SERVICES | Facility: HOSPITAL | Age: 68
LOS: 1 days | End: 2018-12-12
Payer: MEDICARE

## 2018-12-12 DIAGNOSIS — D86.9 SARCOIDOSIS, UNSPECIFIED: ICD-10-CM

## 2018-12-12 DIAGNOSIS — Z95.2 PRESENCE OF PROSTHETIC HEART VALVE: Chronic | ICD-10-CM

## 2018-12-12 DIAGNOSIS — Z41.9 ENCOUNTER FOR PROCEDURE FOR PURPOSES OTHER THAN REMEDYING HEALTH STATE, UNSPECIFIED: Chronic | ICD-10-CM

## 2018-12-12 PROCEDURE — 71250 CT THORAX DX C-: CPT

## 2018-12-12 PROCEDURE — 71250 CT THORAX DX C-: CPT | Mod: 26

## 2019-01-09 ENCOUNTER — APPOINTMENT (OUTPATIENT)
Dept: PULMONOLOGY | Facility: CLINIC | Age: 69
End: 2019-01-09

## 2019-06-04 ENCOUNTER — APPOINTMENT (OUTPATIENT)
Dept: PULMONOLOGY | Facility: CLINIC | Age: 69
End: 2019-06-04

## 2019-10-08 ENCOUNTER — APPOINTMENT (OUTPATIENT)
Dept: HEART AND VASCULAR | Facility: CLINIC | Age: 69
End: 2019-10-08
Payer: MEDICARE

## 2019-10-08 ENCOUNTER — NON-APPOINTMENT (OUTPATIENT)
Age: 69
End: 2019-10-08

## 2019-10-08 VITALS
HEIGHT: 59 IN | HEART RATE: 64 BPM | SYSTOLIC BLOOD PRESSURE: 108 MMHG | DIASTOLIC BLOOD PRESSURE: 53 MMHG | BODY MASS INDEX: 26.81 KG/M2 | WEIGHT: 133 LBS

## 2019-10-08 DIAGNOSIS — I47.2 VENTRICULAR TACHYCARDIA: ICD-10-CM

## 2019-10-08 PROCEDURE — 99205 OFFICE O/P NEW HI 60 MIN: CPT | Mod: 25

## 2019-10-08 PROCEDURE — 93284 PRGRMG EVAL IMPLANTABLE DFB: CPT

## 2019-10-08 RX ORDER — METOPROLOL SUCCINATE 50 MG/1
50 TABLET, EXTENDED RELEASE ORAL
Refills: 0 | Status: ACTIVE | COMMUNITY

## 2019-10-08 RX ORDER — MEXILETINE HYDROCHLORIDE 150 MG/1
150 CAPSULE ORAL
Refills: 0 | Status: ACTIVE | COMMUNITY

## 2019-10-08 RX ORDER — FLUOROMETHOLONE 1 MG/ML
0.1 SOLUTION/ DROPS OPHTHALMIC
Refills: 0 | Status: ACTIVE | COMMUNITY

## 2019-10-08 RX ORDER — LATANOPROST/PF 0.005 %
DROPS OPHTHALMIC (EYE)
Refills: 0 | Status: ACTIVE | COMMUNITY

## 2019-10-08 RX ORDER — DORZOLAMIDE HYDROCHLORIDE 20 MG/ML
2 SOLUTION OPHTHALMIC
Refills: 0 | Status: ACTIVE | COMMUNITY

## 2019-10-08 RX ORDER — AMIODARONE HYDROCHLORIDE 200 MG/1
200 TABLET ORAL TWICE DAILY
Refills: 0 | Status: ACTIVE | COMMUNITY

## 2019-10-08 RX ORDER — SACUBITRIL AND VALSARTAN 49; 51 MG/1; MG/1
49-51 TABLET, FILM COATED ORAL TWICE DAILY
Qty: 180 | Refills: 1 | Status: ACTIVE | COMMUNITY

## 2019-10-08 RX ORDER — METFORMIN HYDROCHLORIDE 500 MG/1
500 TABLET, COATED ORAL
Refills: 0 | Status: ACTIVE | COMMUNITY

## 2019-10-08 NOTE — PHYSICAL EXAM
[5th Left ICS - MCL] : palpated at the 5th LICS in the midclavicular line [Normal] : normal [Normal Rate] : normal [Rhythm Regular] : regular [No Murmur] : no murmurs heard [___ +] : bilateral [unfilled]U+ pretibial pitting edema [Left Infraclavicular] : left infraclavicular area [Clean] : clean [Dry] : dry [Well-Healed] : well-healed [Normal Appearance] : normal appearance [General Appearance - Well Developed] : well developed [General Appearance - Well Nourished] : well nourished [Respiration, Rhythm And Depth] : normal respiratory rhythm and effort [Exaggerated Use Of Accessory Muscles For Inspiration] : no accessory muscle use [Auscultation Breath Sounds / Voice Sounds] : lungs were clear to auscultation bilaterally [Abdomen Tenderness] : non-tender [Abdomen Soft] : soft [Abdomen Mass (___ Cm)] : no abdominal mass palpated [Nail Clubbing] : no clubbing of the fingernails [Cyanosis, Localized] : no localized cyanosis [Petechial Hemorrhages (___cm)] : no petechial hemorrhages [] : no ischemic changes

## 2019-10-10 PROBLEM — I47.2 SUSTAINED VENTRICULAR TACHYCARDIA: Status: ACTIVE | Noted: 2019-10-08

## 2019-10-10 NOTE — HISTORY OF PRESENT ILLNESS
[de-identified] : 68 y/o Polish F with h/o CVA (diplopia 2011), Hypothyroidism, systemic sarcoidosis involving heart, eyes, nose, lungs managed with steroids, paroxysmal atrial fibrillation, mitral valve prolapse and severe MR s/p mitral valve replacement (mechanical 2005), Nonischemic dilated cardiomyopathy s/p ICD placement (1999) s/p upgrade to BiV ICD 9/2008, chronic systolic CHF (diagnosed 1999) and history of sustained VT who presents for initial evaluation. \par \par VT storm refractory to Sotalol 2/2010.  Maintained on Amiodarone until 3/2011- stopped because "everything had quieted down".  Sustained VT 10/2017 in the setting of CHF exacerbation- managed with Amiodarone and diuresis.  Restarted on Amiodarone and Mexilitine in August 2019 when she had recurrent presyncope and VT on interrogation.  Amiodarone increased to 200 mg BID 1-2 weeks ago.  \par \par No recent admissions for CHF. \par \par On Coumadin-  INRs monitored by PMD.  Supra therapeutic recently since AMIO started but has been closely monitored.  No signs of bleeding  \par \par She has never had a cardiac MRI. \par \par Stress ECHO 8/2018 LVEF 23%, fixed apical wall akinesis,

## 2019-10-10 NOTE — PROCEDURE
[Complete Heart Block] : complete heart block [CRT-D] : Cardiac resynchronization therapy defibrillator [Normal] : The battery status is normal. [Lead Imp:  ___ohms] : lead impedance was [unfilled] ohms [Sensing Amplitude ___mv] : sensing amplitude was [unfilled] mv [___V @] : [unfilled] V [___ ms] : [unfilled] ms [de-identified] : [facility]  [de-identified] : 1/29/2014 [de-identified] : ARIADNE [de-identified] : BP 97%\par Sustained VT events noted 9/25/19, 9/26/19, 9/27/19 at rate of ~  bpm.  Longest episode ~ 1 min 13 seconds\par  [de-identified] : 60

## 2019-12-29 ENCOUNTER — INPATIENT (INPATIENT)
Facility: HOSPITAL | Age: 69
LOS: 1 days | Discharge: HOME | DRG: 291 | End: 2019-12-31
Attending: INTERNAL MEDICINE | Admitting: HOSPITALIST
Payer: MEDICARE

## 2019-12-29 VITALS
RESPIRATION RATE: 18 BRPM | HEIGHT: 60 IN | WEIGHT: 132.94 LBS | TEMPERATURE: 97 F | OXYGEN SATURATION: 99 % | DIASTOLIC BLOOD PRESSURE: 74 MMHG | HEART RATE: 66 BPM | SYSTOLIC BLOOD PRESSURE: 117 MMHG

## 2019-12-29 DIAGNOSIS — Z41.9 ENCOUNTER FOR PROCEDURE FOR PURPOSES OTHER THAN REMEDYING HEALTH STATE, UNSPECIFIED: Chronic | ICD-10-CM

## 2019-12-29 DIAGNOSIS — Z95.2 PRESENCE OF PROSTHETIC HEART VALVE: Chronic | ICD-10-CM

## 2019-12-29 LAB
ALBUMIN SERPL ELPH-MCNC: 3.7 G/DL — SIGNIFICANT CHANGE UP (ref 3.3–5.2)
ALP SERPL-CCNC: 133 U/L — HIGH (ref 40–120)
ALT FLD-CCNC: 133 U/L — HIGH
ANION GAP SERPL CALC-SCNC: 12 MMOL/L — SIGNIFICANT CHANGE UP (ref 5–17)
APTT BLD: 47.6 SEC — HIGH (ref 27.5–36.3)
AST SERPL-CCNC: 131 U/L — HIGH
BASE EXCESS BLDV CALC-SCNC: 3.1 MMOL/L — HIGH (ref -2–2)
BASOPHILS # BLD AUTO: 0.03 K/UL — SIGNIFICANT CHANGE UP (ref 0–0.2)
BASOPHILS NFR BLD AUTO: 0.5 % — SIGNIFICANT CHANGE UP (ref 0–2)
BILIRUB SERPL-MCNC: 0.5 MG/DL — SIGNIFICANT CHANGE UP (ref 0.4–2)
BUN SERPL-MCNC: 13 MG/DL — SIGNIFICANT CHANGE UP (ref 8–20)
CA-I SERPL-SCNC: 1.15 MMOL/L — SIGNIFICANT CHANGE UP (ref 1.15–1.33)
CALCIUM SERPL-MCNC: 9 MG/DL — SIGNIFICANT CHANGE UP (ref 8.6–10.2)
CHLORIDE BLDV-SCNC: 105 MMOL/L — SIGNIFICANT CHANGE UP (ref 98–107)
CHLORIDE SERPL-SCNC: 103 MMOL/L — SIGNIFICANT CHANGE UP (ref 98–107)
CO2 SERPL-SCNC: 26 MMOL/L — SIGNIFICANT CHANGE UP (ref 22–29)
CREAT SERPL-MCNC: 0.77 MG/DL — SIGNIFICANT CHANGE UP (ref 0.5–1.3)
EOSINOPHIL # BLD AUTO: 0.25 K/UL — SIGNIFICANT CHANGE UP (ref 0–0.5)
EOSINOPHIL NFR BLD AUTO: 4.2 % — SIGNIFICANT CHANGE UP (ref 0–6)
GAS PNL BLDV: 143 MMOL/L — SIGNIFICANT CHANGE UP (ref 135–145)
GAS PNL BLDV: SIGNIFICANT CHANGE UP
GAS PNL BLDV: SIGNIFICANT CHANGE UP
GLUCOSE BLDV-MCNC: 130 MG/DL — HIGH (ref 70–99)
GLUCOSE SERPL-MCNC: 127 MG/DL — HIGH (ref 70–115)
HCO3 BLDV-SCNC: 26 MMOL/L — SIGNIFICANT CHANGE UP (ref 20–26)
HCT VFR BLD CALC: 34.9 % — SIGNIFICANT CHANGE UP (ref 34.5–45)
HCT VFR BLDA CALC: 36 — LOW (ref 39–50)
HGB BLD CALC-MCNC: 11.7 G/DL — SIGNIFICANT CHANGE UP (ref 11.5–15.5)
HGB BLD-MCNC: 11 G/DL — LOW (ref 11.5–15.5)
IMM GRANULOCYTES NFR BLD AUTO: 0.8 % — SIGNIFICANT CHANGE UP (ref 0–1.5)
INR BLD: 3.49 RATIO — HIGH (ref 0.88–1.16)
LACTATE BLDV-MCNC: 0.9 MMOL/L — SIGNIFICANT CHANGE UP (ref 0.5–2)
LYMPHOCYTES # BLD AUTO: 1.2 K/UL — SIGNIFICANT CHANGE UP (ref 1–3.3)
LYMPHOCYTES # BLD AUTO: 20.2 % — SIGNIFICANT CHANGE UP (ref 13–44)
MCHC RBC-ENTMCNC: 30.7 PG — SIGNIFICANT CHANGE UP (ref 27–34)
MCHC RBC-ENTMCNC: 31.5 GM/DL — LOW (ref 32–36)
MCV RBC AUTO: 97.5 FL — SIGNIFICANT CHANGE UP (ref 80–100)
MONOCYTES # BLD AUTO: 0.6 K/UL — SIGNIFICANT CHANGE UP (ref 0–0.9)
MONOCYTES NFR BLD AUTO: 10.1 % — SIGNIFICANT CHANGE UP (ref 2–14)
NEUTROPHILS # BLD AUTO: 3.8 K/UL — SIGNIFICANT CHANGE UP (ref 1.8–7.4)
NEUTROPHILS NFR BLD AUTO: 64.2 % — SIGNIFICANT CHANGE UP (ref 43–77)
NT-PROBNP SERPL-SCNC: 1600 PG/ML — HIGH (ref 0–300)
OTHER CELLS CSF MANUAL: 11 ML/DL — LOW (ref 18–22)
PCO2 BLDV: 56 MMHG — HIGH (ref 35–50)
PH BLDV: 7.34 — SIGNIFICANT CHANGE UP (ref 7.32–7.43)
PLATELET # BLD AUTO: 150 K/UL — SIGNIFICANT CHANGE UP (ref 150–400)
PO2 BLDV: 37 MMHG — SIGNIFICANT CHANGE UP (ref 25–45)
POTASSIUM BLDV-SCNC: 3.7 MMOL/L — SIGNIFICANT CHANGE UP (ref 3.4–4.5)
POTASSIUM SERPL-MCNC: 3.8 MMOL/L — SIGNIFICANT CHANGE UP (ref 3.5–5.3)
POTASSIUM SERPL-SCNC: 3.8 MMOL/L — SIGNIFICANT CHANGE UP (ref 3.5–5.3)
PROT SERPL-MCNC: 6.7 G/DL — SIGNIFICANT CHANGE UP (ref 6.6–8.7)
PROTHROM AB SERPL-ACNC: 41.7 SEC — HIGH (ref 10–12.9)
RAPID RVP RESULT: DETECTED
RBC # BLD: 3.58 M/UL — LOW (ref 3.8–5.2)
RBC # FLD: 13.7 % — SIGNIFICANT CHANGE UP (ref 10.3–14.5)
RV+EV RNA SPEC QL NAA+PROBE: DETECTED
SAO2 % BLDV: 66 % — SIGNIFICANT CHANGE UP
SODIUM SERPL-SCNC: 141 MMOL/L — SIGNIFICANT CHANGE UP (ref 135–145)
TROPONIN T SERPL-MCNC: <0.01 NG/ML — SIGNIFICANT CHANGE UP (ref 0–0.06)
WBC # BLD: 5.93 K/UL — SIGNIFICANT CHANGE UP (ref 3.8–10.5)
WBC # FLD AUTO: 5.93 K/UL — SIGNIFICANT CHANGE UP (ref 3.8–10.5)

## 2019-12-29 PROCEDURE — 99218: CPT

## 2019-12-29 PROCEDURE — 93010 ELECTROCARDIOGRAM REPORT: CPT

## 2019-12-29 PROCEDURE — 71250 CT THORAX DX C-: CPT | Mod: 26

## 2019-12-29 PROCEDURE — 71046 X-RAY EXAM CHEST 2 VIEWS: CPT | Mod: 26

## 2019-12-29 RX ORDER — LEVALBUTEROL 1.25 MG/.5ML
0.63 SOLUTION, CONCENTRATE RESPIRATORY (INHALATION) ONCE
Refills: 0 | Status: COMPLETED | OUTPATIENT
Start: 2019-12-29 | End: 2019-12-29

## 2019-12-29 RX ORDER — FUROSEMIDE 40 MG
40 TABLET ORAL
Refills: 0 | Status: DISCONTINUED | OUTPATIENT
Start: 2019-12-29 | End: 2019-12-30

## 2019-12-29 RX ORDER — LEVALBUTEROL 1.25 MG/.5ML
0.3 SOLUTION, CONCENTRATE RESPIRATORY (INHALATION) EVERY 4 HOURS
Refills: 0 | Status: DISCONTINUED | OUTPATIENT
Start: 2019-12-29 | End: 2019-12-29

## 2019-12-29 RX ORDER — LEVALBUTEROL 1.25 MG/.5ML
0.31 SOLUTION, CONCENTRATE RESPIRATORY (INHALATION) EVERY 4 HOURS
Refills: 0 | Status: DISCONTINUED | OUTPATIENT
Start: 2019-12-29 | End: 2019-12-31

## 2019-12-29 RX ORDER — FUROSEMIDE 40 MG
40 TABLET ORAL ONCE
Refills: 0 | Status: COMPLETED | OUTPATIENT
Start: 2019-12-29 | End: 2019-12-29

## 2019-12-29 RX ORDER — LEVALBUTEROL 1.25 MG/.5ML
3 SOLUTION, CONCENTRATE RESPIRATORY (INHALATION) ONCE
Refills: 0 | Status: DISCONTINUED | OUTPATIENT
Start: 2019-12-29 | End: 2019-12-29

## 2019-12-29 RX ORDER — LEVALBUTEROL 1.25 MG/.5ML
3 SOLUTION, CONCENTRATE RESPIRATORY (INHALATION) EVERY 4 HOURS
Refills: 0 | Status: DISCONTINUED | OUTPATIENT
Start: 2019-12-29 | End: 2019-12-29

## 2019-12-29 RX ORDER — LEVALBUTEROL 1.25 MG/.5ML
0.63 SOLUTION, CONCENTRATE RESPIRATORY (INHALATION) ONCE
Refills: 0 | Status: DISCONTINUED | OUTPATIENT
Start: 2019-12-29 | End: 2019-12-29

## 2019-12-29 RX ADMIN — LEVALBUTEROL 0.63 MILLIGRAM(S): 1.25 SOLUTION, CONCENTRATE RESPIRATORY (INHALATION) at 17:10

## 2019-12-29 RX ADMIN — Medication 40 MILLIGRAM(S): at 13:57

## 2019-12-29 RX ADMIN — LEVALBUTEROL 0.63 MILLIGRAM(S): 1.25 SOLUTION, CONCENTRATE RESPIRATORY (INHALATION) at 13:55

## 2019-12-29 RX ADMIN — Medication 125 MILLIGRAM(S): at 17:10

## 2019-12-29 NOTE — ED PROVIDER NOTE - NS ED ROS FT
Denies f/c/n/v/cp//palpitations//rash/headache/dizziness/abd.pain/d/c/dysuria/hematuria  +SOB COUGH WHEEZING

## 2019-12-29 NOTE — ED PROVIDER NOTE - PHYSICAL EXAMINATION
Head: atraumatic, normacephalic  Face: atraumatic, no crepitus no orbiral/maxillary/mandibular ttp  throat: uvula midline no exudates  eyes: perrla eomi  heart: rrr s1s2  lungs: DIFFUSE BL RONCHI  abd: soft, nt nd +bs no rebound/guarding no cva ttp  skin: warm  LE:1 + edema bl, no calf ttp  back: no midline cervical/thoracic/lumbar ttp

## 2019-12-29 NOTE — ED ADULT TRIAGE NOTE - CHIEF COMPLAINT QUOTE
pt c/o cough, wheezing and difficulty breathing that started x1 week, resp even and unlabored no distress noted, denies chest pain, abd pain n/v/d

## 2019-12-29 NOTE — ED CDU PROVIDER INITIAL DAY NOTE - MEDICAL DECISION MAKING DETAILS
68 y/o Female PMHx of PPM (Medtronic)/AICD with hx of dilated CMP, [employer] Prince's Mitral valve replacement (2005) on VKA, A-fib on coumadin, open heart surgery (2005), DMII, HTN, HLD, CHF, vertigo, sarcoidosis pw cough and wheezing x 1 week with worsening SOB. Patient well appearing, but diffuse rhonchi on exam with associated decrease in oxygen saturation. Patient placed in CDU for diuresis, nebulization treatments, and cardiology consult. Further management plan pending clinical course and consult recommendations. 70 y/o Female PMHx of PPM (Medtronic)/AICD with hx of dilated CMP, [employer] Mitral valve replacement (2005) on VKA, A-fib on coumadin, open heart surgery (2005), DMII, HTN, HLD, CHF, vertigo, sarcoidosis pw cough and wheezing x 1 week with worsening SOB. +Entero/Rhinovirus. Patient well appearing, but diffuse rhonchi on exam with associated decrease in oxygen saturation. Patient placed in CDU for diuresis, nebulization treatments, and cardiology consult. Further management plan pending clinical course and consult recommendations. 70 y/o Female PMHx of PPM (Medtronic)/AICD with hx of dilated CMP, [employer]'s Mitral valve replacement (2005) on VKA, A-fib on coumadin, open heart surgery (2005), DMII, HTN, HLD, CHF, vertigo, sarcoidosis pw cough and wheezing x 1 week with worsening SOB. +Entero/Rhinovirus. Patient well appearing, but diffuse rhonchi on exam with associated decrease in oxygen saturation. Patient placed in CDU for diuresis, steroids, and nebulization treatments. No cardiac work up indicated at this time per MD to MD conversation. Further management plan pending clinical course.

## 2019-12-29 NOTE — ED PROVIDER NOTE - PROGRESS NOTE DETAILS
bronchitis = enterovirus diffuse b-lines on sono will tx with lasix; nebs steroids--obs for 24 xopenex as albuterol gives pt tachycardia

## 2019-12-29 NOTE — ED CDU PROVIDER INITIAL DAY NOTE - OBJECTIVE STATEMENT
68 y/o Female PMHx of PPM (Medtronic)/AICD with hx of dilated CMP, [employer]'s Mitral valve replacement (2005) on VKA, A-fib on coumadin, open heart surgery (2005), DMII, HTN, HLD, CHF, vertigo, sarcoidosis pw cough and wheezing x 1 week with worsening sob, notes sob at baseline but its worse now. + sick contact grandchild with a cold. Last seen by cardiologist one month ago.   Denies f/c/n/v/cp/palpitations//rash/headache/dizziness/abd.pain/d/c/dysuria/hematuria. no recent travel no hx of dvt/pe  Cards: Fany Barfield 68 y/o Female PMHx of PPM (does not know make)/AICD with hx of dilated CMP, [employer]'s Mitral valve replacement (2005) on VKA, A-fib on coumadin, open heart surgery (2005), DMII, HTN, HLD, CHF, vertigo, sarcoidosis pw cough and wheezing x 1 week with worsening sob, notes sob at baseline but its worse now. + sick contact grandchild with a cold. Last seen by cardiologist one month ago.   Denies f/c/n/v/cp/palpitations//rash/headache/dizziness/abd.pain/d/c/dysuria/hematuria. no recent travel no hx of dvt/pe  Cards: Fany Barfield

## 2019-12-29 NOTE — ED PROVIDER NOTE - OBJECTIVE STATEMENT
68 y/o Female with PMHx of PPM/AICD with hx of dilated CMP, [employer]'s Mitral valve replacement (2005) on VKA, afib on coumadin open heart surgery (2005), DMII, HTN, HLD, vertigo, sarcardosis pw cough and wheezing x 1 week with worsening sob, notes sob at baseline but its worse now. + sick contact garndchild with a cold. Denies f/c/n/v/cp/palpitations//rash/headache/dizziness/abd.pain/d/c/dysuria/hematuria. no recent travel no hx of dvt/pe

## 2019-12-29 NOTE — ED CDU PROVIDER INITIAL DAY NOTE - ATTENDING CONTRIBUTION TO CARE
Pola: I performed a face to face bedside interview with patient regarding history of present illness, review of symptoms and past medical history. I completed an independent physical exam.  I have discussed patient's plan of care with advanced care provider.   I agree with note as stated above including HISTORY OF PRESENT ILLNESS, HIV, PAST MEDICAL/SURGICAL/FAMILY/SOCIAL HISTORY, ALLERGIES AND HOME MEDICATIONS, REVIEW OF SYSTEMS, PHYSICAL EXAM, MEDICAL DECISION MAKING and any PROGRESS NOTES during the time I functioned as the attending physician for this patient  unless otherwise noted. My brief assessment is as follows: Pt with HTN, HLD, DM, PPM, CHF p/w one week of worsened cough and SOB. +entero/rhino virus. Diffuse B-lines on POCUS. +rhonchi and scattered rales on exam. Satting 94-96% on RA on exam. Given lasix 40mg IVP and Xopenex (tachy with albuterol). Placed in CDU for further duiresis and nebulizer treatments.

## 2019-12-30 DIAGNOSIS — Z95.2 PRESENCE OF PROSTHETIC HEART VALVE: ICD-10-CM

## 2019-12-30 DIAGNOSIS — I47.2 VENTRICULAR TACHYCARDIA: ICD-10-CM

## 2019-12-30 DIAGNOSIS — I50.23 ACUTE ON CHRONIC SYSTOLIC (CONGESTIVE) HEART FAILURE: ICD-10-CM

## 2019-12-30 DIAGNOSIS — J96.21 ACUTE AND CHRONIC RESPIRATORY FAILURE WITH HYPOXIA: ICD-10-CM

## 2019-12-30 DIAGNOSIS — R05 COUGH: ICD-10-CM

## 2019-12-30 DIAGNOSIS — I50.9 HEART FAILURE, UNSPECIFIED: ICD-10-CM

## 2019-12-30 DIAGNOSIS — E03.9 HYPOTHYROIDISM, UNSPECIFIED: ICD-10-CM

## 2019-12-30 DIAGNOSIS — J12.9 VIRAL PNEUMONIA, UNSPECIFIED: ICD-10-CM

## 2019-12-30 DIAGNOSIS — Z95.810 PRESENCE OF AUTOMATIC (IMPLANTABLE) CARDIAC DEFIBRILLATOR: ICD-10-CM

## 2019-12-30 DIAGNOSIS — I48.0 PAROXYSMAL ATRIAL FIBRILLATION: ICD-10-CM

## 2019-12-30 LAB — NT-PROBNP SERPL-SCNC: 2567 PG/ML — HIGH (ref 0–300)

## 2019-12-30 PROCEDURE — 99223 1ST HOSP IP/OBS HIGH 75: CPT

## 2019-12-30 PROCEDURE — 99217: CPT

## 2019-12-30 RX ORDER — LEVOTHYROXINE SODIUM 125 MCG
125 TABLET ORAL DAILY
Refills: 0 | Status: DISCONTINUED | OUTPATIENT
Start: 2019-12-30 | End: 2019-12-31

## 2019-12-30 RX ORDER — AZITHROMYCIN 500 MG/1
500 TABLET, FILM COATED ORAL EVERY 24 HOURS
Refills: 0 | Status: DISCONTINUED | OUTPATIENT
Start: 2019-12-31 | End: 2019-12-31

## 2019-12-30 RX ORDER — AZITHROMYCIN 500 MG/1
TABLET, FILM COATED ORAL
Refills: 0 | Status: DISCONTINUED | OUTPATIENT
Start: 2019-12-30 | End: 2019-12-31

## 2019-12-30 RX ORDER — ATORVASTATIN CALCIUM 80 MG/1
20 TABLET, FILM COATED ORAL AT BEDTIME
Refills: 0 | Status: DISCONTINUED | OUTPATIENT
Start: 2019-12-30 | End: 2019-12-31

## 2019-12-30 RX ORDER — METOPROLOL TARTRATE 50 MG
50 TABLET ORAL DAILY
Refills: 0 | Status: DISCONTINUED | OUTPATIENT
Start: 2019-12-30 | End: 2019-12-31

## 2019-12-30 RX ORDER — AMIODARONE HYDROCHLORIDE 400 MG/1
200 TABLET ORAL DAILY
Refills: 0 | Status: DISCONTINUED | OUTPATIENT
Start: 2019-12-30 | End: 2019-12-31

## 2019-12-30 RX ORDER — FOLIC ACID 0.8 MG
1 TABLET ORAL DAILY
Refills: 0 | Status: DISCONTINUED | OUTPATIENT
Start: 2019-12-30 | End: 2019-12-31

## 2019-12-30 RX ORDER — MEXILETINE HYDROCHLORIDE 150 MG/1
150 CAPSULE ORAL
Refills: 0 | Status: DISCONTINUED | OUTPATIENT
Start: 2019-12-30 | End: 2019-12-31

## 2019-12-30 RX ORDER — SACUBITRIL AND VALSARTAN 24; 26 MG/1; MG/1
1 TABLET, FILM COATED ORAL
Qty: 0 | Refills: 0 | DISCHARGE

## 2019-12-30 RX ORDER — AZITHROMYCIN 500 MG/1
500 TABLET, FILM COATED ORAL ONCE
Refills: 0 | Status: COMPLETED | OUTPATIENT
Start: 2019-12-30 | End: 2019-12-30

## 2019-12-30 RX ORDER — ASPIRIN/CALCIUM CARB/MAGNESIUM 324 MG
81 TABLET ORAL DAILY
Refills: 0 | Status: DISCONTINUED | OUTPATIENT
Start: 2019-12-30 | End: 2019-12-31

## 2019-12-30 RX ORDER — CARVEDILOL PHOSPHATE 80 MG/1
1 CAPSULE, EXTENDED RELEASE ORAL
Qty: 0 | Refills: 0 | DISCHARGE

## 2019-12-30 RX ORDER — METFORMIN HYDROCHLORIDE 850 MG/1
1 TABLET ORAL
Qty: 0 | Refills: 0 | DISCHARGE

## 2019-12-30 RX ORDER — WARFARIN SODIUM 2.5 MG/1
1 TABLET ORAL ONCE
Refills: 0 | Status: COMPLETED | OUTPATIENT
Start: 2019-12-30 | End: 2019-12-30

## 2019-12-30 RX ORDER — SACUBITRIL AND VALSARTAN 24; 26 MG/1; MG/1
1 TABLET, FILM COATED ORAL
Refills: 0 | Status: DISCONTINUED | OUTPATIENT
Start: 2019-12-30 | End: 2019-12-31

## 2019-12-30 RX ORDER — FUROSEMIDE 40 MG
40 TABLET ORAL DAILY
Refills: 0 | Status: DISCONTINUED | OUTPATIENT
Start: 2019-12-30 | End: 2019-12-31

## 2019-12-30 RX ADMIN — LEVALBUTEROL 0.31 MILLIGRAM(S): 1.25 SOLUTION, CONCENTRATE RESPIRATORY (INHALATION) at 21:25

## 2019-12-30 RX ADMIN — LEVALBUTEROL 0.31 MILLIGRAM(S): 1.25 SOLUTION, CONCENTRATE RESPIRATORY (INHALATION) at 03:51

## 2019-12-30 RX ADMIN — SACUBITRIL AND VALSARTAN 1 TABLET(S): 24; 26 TABLET, FILM COATED ORAL at 17:08

## 2019-12-30 RX ADMIN — ATORVASTATIN CALCIUM 20 MILLIGRAM(S): 80 TABLET, FILM COATED ORAL at 22:32

## 2019-12-30 RX ADMIN — Medication 40 MILLIGRAM(S): at 06:21

## 2019-12-30 RX ADMIN — LEVALBUTEROL 0.31 MILLIGRAM(S): 1.25 SOLUTION, CONCENTRATE RESPIRATORY (INHALATION) at 08:18

## 2019-12-30 RX ADMIN — LEVALBUTEROL 0.31 MILLIGRAM(S): 1.25 SOLUTION, CONCENTRATE RESPIRATORY (INHALATION) at 16:57

## 2019-12-30 RX ADMIN — Medication 40 MILLIGRAM(S): at 00:20

## 2019-12-30 RX ADMIN — LEVALBUTEROL 0.31 MILLIGRAM(S): 1.25 SOLUTION, CONCENTRATE RESPIRATORY (INHALATION) at 00:24

## 2019-12-30 RX ADMIN — LEVALBUTEROL 0.31 MILLIGRAM(S): 1.25 SOLUTION, CONCENTRATE RESPIRATORY (INHALATION) at 12:49

## 2019-12-30 RX ADMIN — Medication 40 MILLIGRAM(S): at 11:43

## 2019-12-30 RX ADMIN — AZITHROMYCIN 255 MILLIGRAM(S): 500 TABLET, FILM COATED ORAL at 15:30

## 2019-12-30 NOTE — ED CDU PROVIDER DISPOSITION NOTE - CLINICAL COURSE
Patient is a 70 y/o female with a pmhx of AICD with hx of dilated CMP, A fib on coumadin, DM, HTN, HLD, saracardosis presenting to the ed with cough and wheezing x 1 week, ultrasound was preformed at bedside by ED attending B lines noted, BNP elevated, reassessed in morning BNP worsening, diffuse wheezing noted on exam, SOB while ambulating will admit patient for CHF exacerbation

## 2019-12-30 NOTE — ED ADULT NURSE REASSESSMENT NOTE - NS ED NURSE REASSESS COMMENT FT1
resting in ISO 3, family at bedside, NAD
Pt A&OX3, family at bedside, aware she is now admitted.  Pt in NAD.  VSS.  CM in place, NSR.  2LNC maintained.
Pt received @0730, A&OX3, amb ad elmer, mild wheezing bsb, abd soft nondistended, nontender, moving all ext well.   at bedside, NSR.  2LNC maintained.  Will continue to monitor.

## 2019-12-30 NOTE — CONSULT NOTE ADULT - PROBLEM SELECTOR RECOMMENDATION 2
ProBNP increased to >2,000 may indicate mild superimposed CHF.  LVEF<20 %.      Continue her current dose of Entresto and Metoprolol.    Agree with Lasix 40 mg IV x 1 dose, holding IV hydration, and ongoing salt restriction.

## 2019-12-30 NOTE — H&P ADULT - NSICDXPASTSURGICALHX_GEN_ALL_CORE_FT
PAST SURGICAL HISTORY:  Elective surgery back surgury, mitral valve replcement. AICD    H/O mitral valve replacement

## 2019-12-30 NOTE — CONSULT NOTE ADULT - PROBLEM SELECTOR RECOMMENDATION 9
Patient admitted with compliants of wheezing.  PE is notable for new rales up to the left apex and 1/3 way up on the right side.  CXR 12/29/19 demonstrates RAMEZ nodular opacities.  Given positive entero/rhinovirus and rapid RVP and physical findings, patient most likely has a viral pneumonia.

## 2019-12-30 NOTE — ED CDU PROVIDER SUBSEQUENT DAY NOTE - NS ED ROS FT
rx zpak Denies f/c/n/v/cp//palpitations//rash/headache/dizziness/abd.pain/d/c/dysuria/hematuria  +SOB COUGH WHEEZING

## 2019-12-30 NOTE — ED CDU PROVIDER SUBSEQUENT DAY NOTE - MEDICAL DECISION MAKING DETAILS
68 y/o Female PMHx of PPM ([employer])/AICD with hx of dilated CMP, [facility] Mitral valve replacement (2005) on VKA, A-fib on coumadin, open heart surgery (2005), DMII, HTN, HLD, CHF, vertigo, sarcoidosis pw cough and wheezing x 1 week with worsening SOB. +Entero/Rhinovirus. Patient well appearing, but diffuse rhonchi on exam with associated decrease in oxygen saturation. Patient placed in CDU for diuresis, steroids, and nebulization treatments. No cardiac work up indicated at this time per MD to MD conversation. Further management plan pending clinical course.

## 2019-12-30 NOTE — H&P ADULT - NSICDXPASTMEDICALHX_GEN_ALL_CORE_FT
PAST MEDICAL HISTORY:  AICD (automatic cardioverter/defibrillator) present     Cataract bialt    Chronic CHF     Diabetes     Glaucoma     HTN (hypertension)     Hypothyroid     Mitral valve disease     Paroxysmal atrial fibrillation     Sarcoid

## 2019-12-30 NOTE — CONSULT NOTE ADULT - ASSESSMENT
with h/o of HTN, DM, hypothyroidism, paroxysmal atrial tachycardia, s/p ventricular tachycardia treated with Amiodarone, s/p DDD biventricular ICD implantation, s/p mechanical MVR, pulmonary sarcoidosis, Severe dilated CM with LVEF=<20 %, chronic systolic CHF, who developed a nonproductive cough and diffuse myalgias x 6 days.  3 days ago, she developed a cough productive of yellowish sputum.  ROS positive for lower extremity swelling.  CXR done 12/29/19 demonstrates RAMEZ nodular opacities, Chest CT with bilateral bronchiectasis bilaterally, similar to study done 12/12/18.

## 2019-12-30 NOTE — H&P ADULT - ASSESSMENT
70 y/o female with CHF exacerbation, productive cough, Mechanical mitral valve, paroxysmal A Fib, hypothyroidism

## 2019-12-30 NOTE — ED CDU PROVIDER SUBSEQUENT DAY NOTE - TOBACCO USE
Pharmacy:  Discharge Counseling and Medication Reconciliation    [address]   [address]   [place] [address]  [phone] (home)   81 year old male  PCP: GAURAV Grijalva    Allergies: Patient has no known allergies.    Discharge Counseling:    Pharmacist met with patient (and/or family) today to review the medication portion of the After Visit Summary (with an emphasis on NEW medications) and to address patient's questions/concerns.    Summary of Education: patient to be discharged back to [facility], did not need to  him.  Reviewed discharge, instructions for Warfarin and INR is scheduled for patient.      Materials Provided:  MedCounselor sheets printed from Clinical Pharmacology on: NA    Discharge Medication Reconciliation:    It has been determined that the patient has an adequate supply of medications available or which can be obtained from the patient's preferred pharmacy, which HE/SHE has confirmed as: [facility].    Thank you for the consult.    Beti Denise RPH........September 10, 2019 9:05 AM       Unknown if ever smoked

## 2019-12-30 NOTE — ED ADULT NURSE REASSESSMENT NOTE - GENERAL PATIENT STATE
No show
cooperative/smiling/interactive/comfortable appearance
resting/sleeping/family/SO at bedside
comfortable appearance/cooperative/family/SO at bedside

## 2019-12-30 NOTE — ED CDU PROVIDER SUBSEQUENT DAY NOTE - ATTENDING CONTRIBUTION TO CARE
seen with pa on rounds  Pt in obs for CP eval  agree with care plan seen with pa on rounds  Pt in obs for difficulty breathing eval  agree with care plan

## 2019-12-30 NOTE — CONSULT NOTE ADULT - SUBJECTIVE AND OBJECTIVE BOX
Patient is a 69y old  Female who presents with a chief complaint of SOB (30 Dec 2019 12:35)        PAST MEDICAL & SURGICAL HISTORY:  Chronic CHF  Paroxysmal atrial fibrillation  Glaucoma  Sarcoid  AICD (automatic cardioverter/defibrillator) present  Mitral valve disease  Cataract: bialt  HTN (hypertension)  Diabetes  Hypothyroid  H/O mitral valve replacement  Elective surgery: back surgury, mitral valve replcement. AICD      HPI:  69 y.o. female with h/o of HTN, DM, hypothyroidism, paroxysmal atrial tachycardia, s/p ventricular tachycardia treated with Amiodarone, s/p DDD biventricular ICD implantation, s/p mechanical MVR, pulmonary sarcoidosis, Severe dilated CM with LVEF=<20 %, chronic systolic CHF, who developed a nonproductive cough and diffuse myalgias x 6 days.  3 days ago, she developed a cough productive of yellowish sputum.  ROS positive for lower extremity swelling.                PREVIOUS DIAGNOSTIC TESTING:        FINDINGS:  MEDICATIONS  (STANDING):  aMIOdarone    Tablet 200 milliGRAM(s) Oral daily  aspirin enteric coated 81 milliGRAM(s) Oral daily  atorvastatin 20 milliGRAM(s) Oral at bedtime  azithromycin  IVPB      folic acid 1 milliGRAM(s) Oral daily  furosemide   Injectable 40 milliGRAM(s) IV Push daily  levalbuterol Inhalation 0.31 milliGRAM(s) Inhalation every 4 hours  levothyroxine 125 MICROGram(s) Oral daily  metoprolol succinate ER 50 milliGRAM(s) Oral daily  mexiletine 150 milliGRAM(s) Oral <User Schedule>  sacubitril 24 mG/valsartan 26 mG 1 Tablet(s) Oral two times a day  warfarin 1 milliGRAM(s) Oral once    MEDICATIONS  (PRN):      FAMILY HISTORY:  No pertinent family history in first degree relatives      SOCIAL HISTORY:    REVIEW OF SYSTEMS:    CONSTITUTIONAL: Myalgias.  No fever, weight loss, chills, shakes, or fatigue  EYES: No eye pain, visual disturbances, or discharge  ENMT:  No difficulty hearing, tinnitus, vertigo; No sinus or throat pain  NECK: No pain or stiffness  BREASTS: No pain, masses, or nipple discharge  RESPIRATORY: cough productive of yellowish sputum x 3 days, wheezing, dyspnea.    CARDIOVASCULAR: No chest pain, dyspnea, palpitations, dizziness, syncope, paroxysmal nocturnal dyspnea, orthopnea, or arm or leg swelling  GASTROINTESTINAL: No abdominal  or epigastric pain, nausea, vomiting, hematemesis, diarrhea, constipation, melena or bright red blood.  GENITOURINARY: No dysuria, nocturia, hematuria, or urinary incontinence  NEUROLOGICAL: No headaches, memory loss, slurred speech, limb weakness, loss of strength, numbness, or tremors  SKIN: No itching, burning, rashes, or lesions   MUSCULOSKELETAL: No joint pain or swelling, muscle, back, or extremity pain  PSYCHIATRIC: No depression, anxiety, or difficulty sleeping  HEME/LYMPH: No easy bruising or bleeding gums  ALLERY AND IMMUNOLOGIC: No hives or rash.      Vital Signs Last 24 Hrs  T(C): 36.6 (30 Dec 2019 17:11), Max: 36.8 (30 Dec 2019 09:34)  T(F): 97.8 (30 Dec 2019 17:11), Max: 98.3 (30 Dec 2019 09:34)  HR: 66 (30 Dec 2019 17:11) (61 - 68)  BP: 114/57 (30 Dec 2019 17:11) (110/64 - 115/56)  BP(mean): --  RR: 16 (30 Dec 2019 17:11) (16 - 20)  SpO2: 100% (30 Dec 2019 17:11) (96% - 100%)    PHYSICAL EXAM:    GENERAL: acutely ill appearing.  HEAD:  Atraumatic, Normocephalic  EYES: EOMI, PERRLA, conjunctiva and sclera clear  ENMT: No tonsillar erythema, exudates, or enlargement; Moist mucous membranes, Good dentition, No lesions  NECK: Supple and normal thyroid.  No JVD or carotid bruit.  Carotid pulse is 2+ bilaterally.  HEART: Regular rate and rhythm; No murmurs, rubs, or gallops.  PULMONARY: Crackles up to the left apex, and right base 1/4 to 1/3 way up.  ABDOMEN: Soft, Nontender, Nondistended; Bowel sounds present  EXTREMITIES:  1-2+ lower extremity edema  NEUROLOGICAL: Grossly nonfocal          INTERPRETATION OF TELEMETRY:  Normal DDD pacing.    ECG:  Normal atrial sensing and biventricular pacing.      I&O's Detail      LABS:                        11.0   5.93  )-----------( 150      ( 29 Dec 2019 13:55 )             34.9     12-29    141  |  103  |  13.0  ----------------------------<  127<H>  3.8   |  26.0  |  0.77    Ca    9.0      29 Dec 2019 13:55    TPro  6.7  /  Alb  3.7  /  TBili  0.5  /  DBili  x   /  AST  131<H>  /  ALT  133<H>  /  AlkPhos  133<H>  12-29    CARDIAC MARKERS ( 29 Dec 2019 13:55 )  x     / <0.01 ng/mL / x     / x     / x          PT/INR - ( 29 Dec 2019 13:55 )   PT: 41.7 sec;   INR: 3.49 ratio         PTT - ( 29 Dec 2019 13:55 )  PTT:47.6 sec    BNPSerum Pro-Brain Natriuretic Peptide: 2567 pg/mL (12-30 @ 09:59)    I&O's Detail    Daily     Daily     RADIOLOGY & ADDITIONAL STUDIES:

## 2019-12-30 NOTE — H&P ADULT - HISTORY OF PRESENT ILLNESS
70 y/o female with Mechanical mitral valve replacement on coumadin, paroxysmal A fib, CHF, hypothyroidism, AICD, came to the ER because of SOB, wheezing and orthopnea. in the ER, patient became hypoxic on walking. no chest pain or palpitations. cough is productive with yellowish sputum. no fever.  and grandson have cough too. RVP is positive for Rhinovirus. CT chest showed B/L lower lobe bronchiectasis.

## 2019-12-31 ENCOUNTER — TRANSCRIPTION ENCOUNTER (OUTPATIENT)
Age: 69
End: 2019-12-31

## 2019-12-31 VITALS
TEMPERATURE: 98 F | HEART RATE: 60 BPM | RESPIRATION RATE: 16 BRPM | OXYGEN SATURATION: 96 % | DIASTOLIC BLOOD PRESSURE: 56 MMHG | SYSTOLIC BLOOD PRESSURE: 105 MMHG

## 2019-12-31 LAB
APTT BLD: 38.9 SEC — HIGH (ref 27.5–36.3)
HCV AB S/CO SERPL IA: 0.09 S/CO — SIGNIFICANT CHANGE UP (ref 0–0.99)
HCV AB SERPL-IMP: SIGNIFICANT CHANGE UP
INR BLD: 3.83 RATIO — HIGH (ref 0.88–1.16)
PROTHROM AB SERPL-ACNC: 45.9 SEC — HIGH (ref 10–12.9)

## 2019-12-31 PROCEDURE — 86803 HEPATITIS C AB TEST: CPT

## 2019-12-31 PROCEDURE — 96374 THER/PROPH/DIAG INJ IV PUSH: CPT

## 2019-12-31 PROCEDURE — 87633 RESP VIRUS 12-25 TARGETS: CPT

## 2019-12-31 PROCEDURE — 82947 ASSAY GLUCOSE BLOOD QUANT: CPT

## 2019-12-31 PROCEDURE — 85014 HEMATOCRIT: CPT

## 2019-12-31 PROCEDURE — 93005 ELECTROCARDIOGRAM TRACING: CPT

## 2019-12-31 PROCEDURE — 87798 DETECT AGENT NOS DNA AMP: CPT

## 2019-12-31 PROCEDURE — 99285 EMERGENCY DEPT VISIT HI MDM: CPT | Mod: 25

## 2019-12-31 PROCEDURE — 96376 TX/PRO/DX INJ SAME DRUG ADON: CPT | Mod: XU

## 2019-12-31 PROCEDURE — 87581 M.PNEUMON DNA AMP PROBE: CPT

## 2019-12-31 PROCEDURE — 71250 CT THORAX DX C-: CPT

## 2019-12-31 PROCEDURE — 85610 PROTHROMBIN TIME: CPT

## 2019-12-31 PROCEDURE — 71046 X-RAY EXAM CHEST 2 VIEWS: CPT

## 2019-12-31 PROCEDURE — 94640 AIRWAY INHALATION TREATMENT: CPT

## 2019-12-31 PROCEDURE — 99239 HOSP IP/OBS DSCHRG MGMT >30: CPT

## 2019-12-31 PROCEDURE — 87486 CHLMYD PNEUM DNA AMP PROBE: CPT

## 2019-12-31 PROCEDURE — 36415 COLL VENOUS BLD VENIPUNCTURE: CPT

## 2019-12-31 PROCEDURE — 82330 ASSAY OF CALCIUM: CPT

## 2019-12-31 PROCEDURE — 85730 THROMBOPLASTIN TIME PARTIAL: CPT

## 2019-12-31 PROCEDURE — 82803 BLOOD GASES ANY COMBINATION: CPT

## 2019-12-31 PROCEDURE — 84484 ASSAY OF TROPONIN QUANT: CPT

## 2019-12-31 PROCEDURE — G0378: CPT

## 2019-12-31 PROCEDURE — 96375 TX/PRO/DX INJ NEW DRUG ADDON: CPT

## 2019-12-31 PROCEDURE — 84132 ASSAY OF SERUM POTASSIUM: CPT

## 2019-12-31 PROCEDURE — 82435 ASSAY OF BLOOD CHLORIDE: CPT

## 2019-12-31 PROCEDURE — 84295 ASSAY OF SERUM SODIUM: CPT

## 2019-12-31 PROCEDURE — 83880 ASSAY OF NATRIURETIC PEPTIDE: CPT

## 2019-12-31 PROCEDURE — 80053 COMPREHEN METABOLIC PANEL: CPT

## 2019-12-31 PROCEDURE — 83605 ASSAY OF LACTIC ACID: CPT

## 2019-12-31 PROCEDURE — 85027 COMPLETE CBC AUTOMATED: CPT

## 2019-12-31 RX ORDER — AZITHROMYCIN 500 MG/1
1 TABLET, FILM COATED ORAL
Qty: 5 | Refills: 0
Start: 2019-12-31 | End: 2020-01-04

## 2019-12-31 RX ORDER — ALBUTEROL 90 UG/1
2 AEROSOL, METERED ORAL
Qty: 1 | Refills: 0
Start: 2019-12-31 | End: 2020-01-29

## 2019-12-31 RX ADMIN — AMIODARONE HYDROCHLORIDE 200 MILLIGRAM(S): 400 TABLET ORAL at 06:10

## 2019-12-31 RX ADMIN — SACUBITRIL AND VALSARTAN 1 TABLET(S): 24; 26 TABLET, FILM COATED ORAL at 06:10

## 2019-12-31 RX ADMIN — LEVALBUTEROL 0.31 MILLIGRAM(S): 1.25 SOLUTION, CONCENTRATE RESPIRATORY (INHALATION) at 03:46

## 2019-12-31 RX ADMIN — LEVALBUTEROL 0.31 MILLIGRAM(S): 1.25 SOLUTION, CONCENTRATE RESPIRATORY (INHALATION) at 00:57

## 2019-12-31 RX ADMIN — Medication 40 MILLIGRAM(S): at 06:10

## 2019-12-31 RX ADMIN — Medication 50 MILLIGRAM(S): at 06:10

## 2019-12-31 RX ADMIN — Medication 125 MICROGRAM(S): at 06:10

## 2019-12-31 RX ADMIN — MEXILETINE HYDROCHLORIDE 150 MILLIGRAM(S): 150 CAPSULE ORAL at 06:10

## 2019-12-31 NOTE — DISCHARGE NOTE NURSING/CASE MANAGEMENT/SOCIAL WORK - PATIENT PORTAL LINK FT
You can access the FollowMyHealth Patient Portal offered by [facility] by registering at the following website: [email or web address]. By joining [facility] FollowMyHealth portal, you will also be able to view your health information using other applications (apps) compatible with our system.

## 2019-12-31 NOTE — DISCHARGE NOTE PROVIDER - NSDCCPCAREPLAN_GEN_ALL_CORE_FT
PRINCIPAL DISCHARGE DIAGNOSIS  Diagnosis: Viral pneumonia  Assessment and Plan of Treatment: use inhaler as needed  finish course of azithromycin and medrol dose pack  monitor fingersticks while on steroids  please follow up with PMD Dr Cleary in 2-3 days.  please follow up with cardiology in 1 week.      SECONDARY DISCHARGE DIAGNOSES  Diagnosis: Acute on chronic systolic heart failure  Assessment and Plan of Treatment: continue lasix therapy.    Diagnosis: Mitral valve replaced  Assessment and Plan of Treatment: continue coumadin

## 2019-12-31 NOTE — DISCHARGE NOTE PROVIDER - CARE PROVIDER_API CALL
Fany Barfield)  Cardiac Electrophysiology; Cardiovascular Disease; Internal Medicine  [address] [place] [address]  Phone: [phone]  Fax: [phone]  Follow Up Time:

## 2019-12-31 NOTE — DISCHARGE NOTE PROVIDER - HOSPITAL COURSE
68 y/o female with Mechanical mitral valve replacement on coumadin, paroxysmal A fib, HFrEF, hypothyroidism, AICD, came to the ER because of SOB, wheezing and orthopnea. in the ER, patient became hypoxic on walking. no chest pain or palpitations. cough is productive with yellowish sputum. no fever.  and grandson have cough too. RVP is positive for Rhinovirus. CT chest showed B/L lower lobe bronchiectasis.         Treated with nebulizers and azithromycin, IV lasix with improvement in respiratory status.    stable for discharge home. family requesting and agreeable with discharge.    vitals stable afebrile aaox3 nad rrr s1s2 crackles right base no wheeze soft abdomen no LE edema    nonfocal neuro        dc planning 32 minutes.

## 2019-12-31 NOTE — DISCHARGE NOTE PROVIDER - NSDCMRMEDTOKEN_GEN_ALL_CORE_FT
albuterol 90 mcg/inh inhalation aerosol: 2 puff(s) inhaled 4 times a day, As Needed -for shortness of breath and/or wheezing   amiodarone 200 mg oral tablet: 1 tab(s) orally once a day  aspirin 81 mg oral delayed release tablet: 1 tab(s) orally once a day  atorvastatin 20 mg oral tablet: 1 tab(s) orally once a day  Azithromycin 5 Day Dose Pack 250 mg oral tablet: per pack instructions  dorzolamide 2% ophthalmic solution: 1 drop(s) to each affected eye once a day  Entresto 49 mg-51 mg oral tablet: 1 tab(s) orally once a day (at bedtime)  folic acid 1 mg oral tablet: 1 tab(s) orally once a day  Lasix 20 mg oral tablet: 1 tab(s) orally once a day  levothyroxine 125 mcg (0.125 mg) oral tablet: 1 tab(s) orally once a day  Medrol Dosepak 4 mg oral tablet: PER PACK INSTRUCTIONS  metoprolol succinate 50 mg oral tablet, extended release: 1 tab(s) orally once a day  mexiletine 150 mg oral capsule: 1 cap(s) orally once a day  warfarin 1 mg oral tablet: 1 tab(s) orally once a day

## 2021-07-02 ENCOUNTER — INPATIENT (INPATIENT)
Facility: HOSPITAL | Age: 71
LOS: 1 days | Discharge: HOME | DRG: 193 | End: 2021-07-04
Attending: HOSPITALIST | Admitting: FAMILY MEDICINE
Payer: MEDICARE

## 2021-07-02 VITALS — HEIGHT: 60 IN

## 2021-07-02 DIAGNOSIS — I48.0 PAROXYSMAL ATRIAL FIBRILLATION: ICD-10-CM

## 2021-07-02 DIAGNOSIS — I47.1 SUPRAVENTRICULAR TACHYCARDIA: ICD-10-CM

## 2021-07-02 DIAGNOSIS — R74.01 ELEVATION OF LEVELS OF LIVER TRANSAMINASE LEVELS: ICD-10-CM

## 2021-07-02 DIAGNOSIS — Z95.810 PRESENCE OF AUTOMATIC (IMPLANTABLE) CARDIAC DEFIBRILLATOR: ICD-10-CM

## 2021-07-02 DIAGNOSIS — D64.9 ANEMIA, UNSPECIFIED: ICD-10-CM

## 2021-07-02 DIAGNOSIS — E03.9 HYPOTHYROIDISM, UNSPECIFIED: ICD-10-CM

## 2021-07-02 DIAGNOSIS — Z95.2 PRESENCE OF PROSTHETIC HEART VALVE: Chronic | ICD-10-CM

## 2021-07-02 DIAGNOSIS — I50.22 CHRONIC SYSTOLIC (CONGESTIVE) HEART FAILURE: ICD-10-CM

## 2021-07-02 DIAGNOSIS — J18.9 PNEUMONIA, UNSPECIFIED ORGANISM: ICD-10-CM

## 2021-07-02 DIAGNOSIS — I42.0 DILATED CARDIOMYOPATHY: ICD-10-CM

## 2021-07-02 DIAGNOSIS — Z95.2 PRESENCE OF PROSTHETIC HEART VALVE: ICD-10-CM

## 2021-07-02 DIAGNOSIS — I10 ESSENTIAL (PRIMARY) HYPERTENSION: ICD-10-CM

## 2021-07-02 DIAGNOSIS — Z41.9 ENCOUNTER FOR PROCEDURE FOR PURPOSES OTHER THAN REMEDYING HEALTH STATE, UNSPECIFIED: Chronic | ICD-10-CM

## 2021-07-02 LAB
ALBUMIN SERPL ELPH-MCNC: 3.9 G/DL — SIGNIFICANT CHANGE UP (ref 3.3–5.2)
ALP SERPL-CCNC: 129 U/L — HIGH (ref 40–120)
ALT FLD-CCNC: 70 U/L — HIGH
ANION GAP SERPL CALC-SCNC: 9 MMOL/L — SIGNIFICANT CHANGE UP (ref 5–17)
APTT BLD: 30.6 SEC — SIGNIFICANT CHANGE UP (ref 27.5–35.5)
AST SERPL-CCNC: 94 U/L — HIGH
BASOPHILS # BLD AUTO: 0.04 K/UL — SIGNIFICANT CHANGE UP (ref 0–0.2)
BASOPHILS NFR BLD AUTO: 0.5 % — SIGNIFICANT CHANGE UP (ref 0–2)
BILIRUB SERPL-MCNC: 0.8 MG/DL — SIGNIFICANT CHANGE UP (ref 0.4–2)
BUN SERPL-MCNC: 12.2 MG/DL — SIGNIFICANT CHANGE UP (ref 8–20)
CALCIUM SERPL-MCNC: 8.9 MG/DL — SIGNIFICANT CHANGE UP (ref 8.6–10.2)
CHLORIDE SERPL-SCNC: 106 MMOL/L — SIGNIFICANT CHANGE UP (ref 98–107)
CO2 SERPL-SCNC: 26 MMOL/L — SIGNIFICANT CHANGE UP (ref 22–29)
CREAT SERPL-MCNC: 0.66 MG/DL — SIGNIFICANT CHANGE UP (ref 0.5–1.3)
D DIMER BLD IA.RAPID-MCNC: 343 NG/ML DDU — HIGH
EOSINOPHIL # BLD AUTO: 0.17 K/UL — SIGNIFICANT CHANGE UP (ref 0–0.5)
EOSINOPHIL NFR BLD AUTO: 2.3 % — SIGNIFICANT CHANGE UP (ref 0–6)
GLUCOSE SERPL-MCNC: 108 MG/DL — HIGH (ref 70–99)
HCT VFR BLD CALC: 30.8 % — LOW (ref 34.5–45)
HGB BLD-MCNC: 9.8 G/DL — LOW (ref 11.5–15.5)
IMM GRANULOCYTES NFR BLD AUTO: 0.9 % — SIGNIFICANT CHANGE UP (ref 0–1.5)
INR BLD: 2.54 RATIO — HIGH (ref 0.88–1.16)
LACTATE BLDV-MCNC: 1.6 MMOL/L — SIGNIFICANT CHANGE UP (ref 0.5–2)
LIDOCAIN IGE QN: 42 U/L — SIGNIFICANT CHANGE UP (ref 22–51)
LYMPHOCYTES # BLD AUTO: 1.34 K/UL — SIGNIFICANT CHANGE UP (ref 1–3.3)
LYMPHOCYTES # BLD AUTO: 18.1 % — SIGNIFICANT CHANGE UP (ref 13–44)
MAGNESIUM SERPL-MCNC: 1.9 MG/DL — SIGNIFICANT CHANGE UP (ref 1.6–2.6)
MCHC RBC-ENTMCNC: 30.5 PG — SIGNIFICANT CHANGE UP (ref 27–34)
MCHC RBC-ENTMCNC: 31.8 GM/DL — LOW (ref 32–36)
MCV RBC AUTO: 96 FL — SIGNIFICANT CHANGE UP (ref 80–100)
MONOCYTES # BLD AUTO: 0.67 K/UL — SIGNIFICANT CHANGE UP (ref 0–0.9)
MONOCYTES NFR BLD AUTO: 9 % — SIGNIFICANT CHANGE UP (ref 2–14)
NEUTROPHILS # BLD AUTO: 5.13 K/UL — SIGNIFICANT CHANGE UP (ref 1.8–7.4)
NEUTROPHILS NFR BLD AUTO: 69.2 % — SIGNIFICANT CHANGE UP (ref 43–77)
NT-PROBNP SERPL-SCNC: 3474 PG/ML — HIGH (ref 0–300)
PLATELET # BLD AUTO: 147 K/UL — LOW (ref 150–400)
POTASSIUM SERPL-MCNC: 4.3 MMOL/L — SIGNIFICANT CHANGE UP (ref 3.5–5.3)
POTASSIUM SERPL-SCNC: 4.3 MMOL/L — SIGNIFICANT CHANGE UP (ref 3.5–5.3)
PROT SERPL-MCNC: 7.1 G/DL — SIGNIFICANT CHANGE UP (ref 6.6–8.7)
PROTHROM AB SERPL-ACNC: 28.2 SEC — HIGH (ref 10.6–13.6)
RBC # BLD: 3.21 M/UL — LOW (ref 3.8–5.2)
RBC # FLD: 14.7 % — HIGH (ref 10.3–14.5)
SARS-COV-2 RNA SPEC QL NAA+PROBE: SIGNIFICANT CHANGE UP
SODIUM SERPL-SCNC: 141 MMOL/L — SIGNIFICANT CHANGE UP (ref 135–145)
TROPONIN T SERPL-MCNC: <0.01 NG/ML — SIGNIFICANT CHANGE UP (ref 0–0.06)
TROPONIN T SERPL-MCNC: <0.01 NG/ML — SIGNIFICANT CHANGE UP (ref 0–0.06)
WBC # BLD: 7.42 K/UL — SIGNIFICANT CHANGE UP (ref 3.8–10.5)
WBC # FLD AUTO: 7.42 K/UL — SIGNIFICANT CHANGE UP (ref 3.8–10.5)

## 2021-07-02 PROCEDURE — 71250 CT THORAX DX C-: CPT | Mod: 26

## 2021-07-02 PROCEDURE — 71046 X-RAY EXAM CHEST 2 VIEWS: CPT | Mod: 26

## 2021-07-02 PROCEDURE — 99285 EMERGENCY DEPT VISIT HI MDM: CPT

## 2021-07-02 PROCEDURE — 93010 ELECTROCARDIOGRAM REPORT: CPT

## 2021-07-02 PROCEDURE — 99223 1ST HOSP IP/OBS HIGH 75: CPT

## 2021-07-02 RX ORDER — FUROSEMIDE 40 MG
20 TABLET ORAL EVERY 12 HOURS
Refills: 0 | Status: DISCONTINUED | OUTPATIENT
Start: 2021-07-02 | End: 2021-07-03

## 2021-07-02 RX ORDER — WARFARIN SODIUM 2.5 MG/1
1 TABLET ORAL ONCE
Refills: 0 | Status: COMPLETED | OUTPATIENT
Start: 2021-07-02 | End: 2021-07-02

## 2021-07-02 RX ORDER — FUROSEMIDE 40 MG
20 TABLET ORAL ONCE
Refills: 0 | Status: COMPLETED | OUTPATIENT
Start: 2021-07-02 | End: 2021-07-02

## 2021-07-02 RX ORDER — ASPIRIN/CALCIUM CARB/MAGNESIUM 324 MG
162 TABLET ORAL ONCE
Refills: 0 | Status: COMPLETED | OUTPATIENT
Start: 2021-07-02 | End: 2021-07-02

## 2021-07-02 RX ORDER — DORZOLAMIDE HYDROCHLORIDE 20 MG/ML
1 SOLUTION/ DROPS OPHTHALMIC
Refills: 0 | Status: DISCONTINUED | OUTPATIENT
Start: 2021-07-02 | End: 2021-07-04

## 2021-07-02 RX ORDER — AZITHROMYCIN 500 MG/1
500 TABLET, FILM COATED ORAL ONCE
Refills: 0 | Status: COMPLETED | OUTPATIENT
Start: 2021-07-02 | End: 2021-07-02

## 2021-07-02 RX ORDER — FAMOTIDINE 10 MG/ML
20 INJECTION INTRAVENOUS ONCE
Refills: 0 | Status: COMPLETED | OUTPATIENT
Start: 2021-07-02 | End: 2021-07-02

## 2021-07-02 RX ORDER — ATORVASTATIN CALCIUM 80 MG/1
10 TABLET, FILM COATED ORAL AT BEDTIME
Refills: 0 | Status: DISCONTINUED | OUTPATIENT
Start: 2021-07-02 | End: 2021-07-04

## 2021-07-02 RX ORDER — SACUBITRIL AND VALSARTAN 24; 26 MG/1; MG/1
1 TABLET, FILM COATED ORAL
Refills: 0 | Status: DISCONTINUED | OUTPATIENT
Start: 2021-07-02 | End: 2021-07-04

## 2021-07-02 RX ORDER — FUROSEMIDE 40 MG
20 TABLET ORAL DAILY
Refills: 0 | Status: DISCONTINUED | OUTPATIENT
Start: 2021-07-02 | End: 2021-07-03

## 2021-07-02 RX ORDER — CEFTRIAXONE 500 MG/1
1000 INJECTION, POWDER, FOR SOLUTION INTRAMUSCULAR; INTRAVENOUS EVERY 24 HOURS
Refills: 0 | Status: DISCONTINUED | OUTPATIENT
Start: 2021-07-02 | End: 2021-07-03

## 2021-07-02 RX ORDER — LEVOTHYROXINE SODIUM 125 MCG
75 TABLET ORAL DAILY
Refills: 0 | Status: DISCONTINUED | OUTPATIENT
Start: 2021-07-02 | End: 2021-07-04

## 2021-07-02 RX ORDER — AMIODARONE HYDROCHLORIDE 400 MG/1
100 TABLET ORAL DAILY
Refills: 0 | Status: DISCONTINUED | OUTPATIENT
Start: 2021-07-02 | End: 2021-07-03

## 2021-07-02 RX ORDER — METOPROLOL TARTRATE 50 MG
25 TABLET ORAL DAILY
Refills: 0 | Status: DISCONTINUED | OUTPATIENT
Start: 2021-07-02 | End: 2021-07-04

## 2021-07-02 RX ORDER — AZITHROMYCIN 500 MG/1
500 TABLET, FILM COATED ORAL EVERY 24 HOURS
Refills: 0 | Status: DISCONTINUED | OUTPATIENT
Start: 2021-07-03 | End: 2021-07-03

## 2021-07-02 RX ORDER — FOLIC ACID 0.8 MG
1 TABLET ORAL DAILY
Refills: 0 | Status: DISCONTINUED | OUTPATIENT
Start: 2021-07-02 | End: 2021-07-04

## 2021-07-02 RX ORDER — ACETAMINOPHEN 500 MG
650 TABLET ORAL ONCE
Refills: 0 | Status: COMPLETED | OUTPATIENT
Start: 2021-07-02 | End: 2021-07-02

## 2021-07-02 RX ORDER — ASPIRIN/CALCIUM CARB/MAGNESIUM 324 MG
81 TABLET ORAL DAILY
Refills: 0 | Status: DISCONTINUED | OUTPATIENT
Start: 2021-07-02 | End: 2021-07-04

## 2021-07-02 RX ORDER — LACTOBACILLUS ACIDOPHILUS 100MM CELL
1 CAPSULE ORAL
Refills: 0 | Status: DISCONTINUED | OUTPATIENT
Start: 2021-07-02 | End: 2021-07-04

## 2021-07-02 RX ORDER — CEFTRIAXONE 500 MG/1
1000 INJECTION, POWDER, FOR SOLUTION INTRAMUSCULAR; INTRAVENOUS ONCE
Refills: 0 | Status: COMPLETED | OUTPATIENT
Start: 2021-07-02 | End: 2021-07-02

## 2021-07-02 RX ORDER — ATORVASTATIN CALCIUM 80 MG/1
20 TABLET, FILM COATED ORAL AT BEDTIME
Refills: 0 | Status: DISCONTINUED | OUTPATIENT
Start: 2021-07-02 | End: 2021-07-02

## 2021-07-02 RX ADMIN — Medication 162 MILLIGRAM(S): at 14:56

## 2021-07-02 RX ADMIN — Medication 650 MILLIGRAM(S): at 23:16

## 2021-07-02 RX ADMIN — Medication 20 MILLIGRAM(S): at 17:39

## 2021-07-02 RX ADMIN — SACUBITRIL AND VALSARTAN 1 TABLET(S): 24; 26 TABLET, FILM COATED ORAL at 19:25

## 2021-07-02 RX ADMIN — FAMOTIDINE 20 MILLIGRAM(S): 10 INJECTION INTRAVENOUS at 14:56

## 2021-07-02 RX ADMIN — ATORVASTATIN CALCIUM 10 MILLIGRAM(S): 80 TABLET, FILM COATED ORAL at 21:25

## 2021-07-02 RX ADMIN — CEFTRIAXONE 100 MILLIGRAM(S): 500 INJECTION, POWDER, FOR SOLUTION INTRAMUSCULAR; INTRAVENOUS at 17:39

## 2021-07-02 RX ADMIN — WARFARIN SODIUM 1 MILLIGRAM(S): 2.5 TABLET ORAL at 21:25

## 2021-07-02 RX ADMIN — Medication 30 MILLILITER(S): at 14:56

## 2021-07-02 RX ADMIN — AZITHROMYCIN 255 MILLIGRAM(S): 500 TABLET, FILM COATED ORAL at 20:17

## 2021-07-02 NOTE — ED PROVIDER NOTE - OBJECTIVE STATEMENT
Pt is a 71 y.o. F hx of cardiomyopathy EF 15%, s/p mechanical MVR, AICD, chronic CHF, pAfib, glaucoma, sarcoidosis, DM, hypothyroidism, presenting with chest pain. Pain is pleuritic, substernal/epigastric, nonradiating. Has resting shortness of breath. No history of malignancy, travel or sick contacts. Also complaining of pedal swelling.

## 2021-07-02 NOTE — CONSULT NOTE ADULT - ASSESSMENT
71 y.o. female with hyperlipidemia,  systemic sarcoidosis involving heart, eyes, nose, ear treated with Prednisone, pulmonary sarcoidosis (chest CT with IV contrast 11/30/11 demonstrated patchy infiltrates bilaterally, calcified mediastinal/hilar adenopathy) empirically treated with steroids 2-3/2012, s/p TIA 5/16/2011 with 2 days of diploplia, hypothyroidism, s/p hosp for VT storm 10/6/17, s/p hosp 12/29/19 with flu and acute bronchitis, MVP with severe mitral regurgitation, s/p MVR ([facility] [facility]) on 10/5/2005, s/p DDD ICD implantation in '99, s/p DDD ICD pulse generator change in 1/8/03, s/p upgrade to biventricular ICD with pulse generator change 9/28/08, s/p biventricular ICD pulse generator change 1/29/14, s/p right lower jaw molar extraction 11/28/16, s/p left eye cataract surgery, who presents with 3 days of chest pain that worsened with walking, lying in bed on her sides, and/or talking and worsening resting and exertional dyspnea.  ROS is also notable for worsening lower extremity swelling.  ProBNP=3,474

## 2021-07-02 NOTE — CONSULT NOTE ADULT - ATTENDING COMMENTS
Admitted with 2 to 3 days of new chest pain and dyspnea- Probable bilateral pulmonary pneumonia and/or worsening of chronic systolic CHF.      Recommendations:  Empiric IV antibiotics and gentle diuresis.  Follow proBNP, daily weights, strict I/Os

## 2021-07-02 NOTE — H&P ADULT - PROBLEM SELECTOR PLAN 8
ast 94, alt 70, passive congestion from chf ? will trend and lower lipitor to 10 mg daily from 20 mg daily.

## 2021-07-02 NOTE — ED PROVIDER NOTE - PRO INTERPRETER NEED 2
"July 13, 2020         Mary Corado  [address]  [place] [address]        Dear Mary:  Urine culture is back.  No bad bacteria.  I am guessing the dehydration and long day on the bike contributed to your symptoms.  I am glad there was not infection.  Keep riding!  Take care   AUTUMN Sesay     Below are the results from your recent visit:    Resulted Orders   URINE CULTURE(NEW)   Result Value Ref Range    Significant Indicator NEG     Source UR     Site URINE, CLEAN CATCH     Culture Result No growth at 48 hours.     Narrative    Patient height (in inches)->1.6 m (5' 3\")  Patient weight (in lbs)->130  Total urine volume units?->ML  Patient height (in inches)->1.6 m (5' 3\")     If you have any questions or concerns, please don't hesitate to call.        Sincerely,      AUTUMN Sesay    Electronically Signed  " English

## 2021-07-02 NOTE — H&P ADULT - NSICDXPASTMEDICALHX_GEN_ALL_CORE_FT
PAST MEDICAL HISTORY:  AICD (automatic cardioverter/defibrillator) present     Cataract bialt    Chronic CHF     Diabetes pt. stated that she is not DM and does not use any meds.    Glaucoma     HTN (hypertension)     Hypothyroid     Mitral valve disease     Paroxysmal atrial fibrillation     Sarcoid

## 2021-07-02 NOTE — ED ADULT NURSE REASSESSMENT NOTE - NS ED NURSE REASSESS COMMENT FT1
pt a+ox3, sitting comfortably with daughter at bedside. admitting MD @ bedside, will continue to monitor.

## 2021-07-02 NOTE — H&P ADULT - PROBLEM SELECTOR PLAN 1
pt. is from home , no hospitalization in last 3 months, will treat as CAP, keep on rocephin and zithromax, follow cultures. pleuritic cp likely pneumonia related. trop negative x 1.

## 2021-07-02 NOTE — CONSULT NOTE ADULT - SUBJECTIVE AND OBJECTIVE BOX
Patient is a 71y old  Female who presents with a chief complaint of chf / pneumonia (02 Jul 2021 17:19)        PAST MEDICAL & SURGICAL HISTORY:  Hypothyroid    Diabetes  pt. stated that she is not DM and does not use any meds.    HTN (hypertension)    Cataract  bialt    Mitral valve disease    AICD (automatic cardioverter/defibrillator) present    Sarcoid    Glaucoma    Paroxysmal atrial fibrillation    Chronic CHF    Elective surgery  back surgury, mitral valve replcement. AICD    H/O mitral valve replacement        HPI:  71 y.o. female with hyperlipidemia,  systemic sarcoidosis involving heart, eyes, nose, ear treated with Prednisone, pulmonary sarcoidosis (chest CT with IV contrast 11/30/11 demonstrated patchy infiltrates bilaterally, calcified mediastinal/hilar adenopathy) empirically treated with steroids 2-3/2012, s/p TIA 5/16/2011 with 2 days of diploplia, hypothyroidism, s/p hosp for VT storm 10/6/17, s/p hosp 12/29/19 with flu and acute bronchitis, MVP with severe mitral regurgitation, s/p MVR ([facility]) on 10/5/2005, s/p DDD ICD implantation in '99, s/p DDD ICD pulse generator change in 1/8/03, s/p upgrade to biventricular ICD with pulse generator change 9/28/08, s/p biventricular ICD pulse generator change 1/29/14, s/p right lower jaw molar extraction 11/28/16, s/p left eye cataract surgery, who presents with 3 days of chest pain that worsened with walking, lying in bed on her sides, and/or talking and worsening resting and exertional dyspnea.  ROS is also notable for worsening lower extremity swelling.  ProBNP=3,474           FINDINGS:  MEDICATIONS  (STANDING):  aMIOdarone    Tablet 100 milliGRAM(s) Oral daily  aspirin enteric coated 81 milliGRAM(s) Oral daily  atorvastatin 10 milliGRAM(s) Oral at bedtime  azithromycin  IVPB 500 milliGRAM(s) IV Intermittent every 24 hours  azithromycin  IVPB 500 milliGRAM(s) IV Intermittent once  cefTRIAXone   IVPB 1000 milliGRAM(s) IV Intermittent every 24 hours  dorzolamide 2% Ophthalmic Solution 1 Drop(s) Both EYES <User Schedule>  folic acid 1 milliGRAM(s) Oral daily  furosemide   Injectable 20 milliGRAM(s) IV Push every 12 hours  furosemide   Injectable 20 milliGRAM(s) IV Push daily  lactobacillus acidophilus 1 Tablet(s) Oral two times a day with meals  levothyroxine 75 MICROGram(s) Oral daily  metoprolol succinate ER 25 milliGRAM(s) Oral daily  multivitamin 1 Tablet(s) Oral daily  sacubitril 49 mG/valsartan 51 mG 1 Tablet(s) Oral two times a day  warfarin 1 milliGRAM(s) Oral once    MEDICATIONS  (PRN):      FAMILY HISTORY:  No pertinent family history in first degree relatives           REVIEW OF SYSTEMS:    CONSTITUTIONAL: No fever, weight loss, chills, shakes, or fatigue  EYES: No eye pain, visual disturbances, or discharge  ENMT:  No difficulty hearing, tinnitus, vertigo; No sinus or throat pain  NECK: No pain or stiffness  BREASTS: No pain, masses, or nipple discharge  RESPIRATORY: Shortness of breath x 3 days.  CARDIOVASCULAR: Diffuse mid chest pain when she lies down on her side or walks from room to room in her house, and bilateral leg swelling  GASTROINTESTINAL: No abdominal  or epigastric pain, nausea, vomiting, hematemesis, diarrhea, constipation, melena or bright red blood. Positive anorexia  GENITOURINARY: No dysuria, nocturia, hematuria, or urinary incontinence  NEUROLOGICAL: Worsening short term memory loss.  No slurred speech, limb weakness, loss of strength, numbness, or tremors  SKIN: No itching, burning, rashes, or lesions   ENDOCRINE: No heat or cold intolerance, or hair loss  MUSCULOSKELETAL: No joint pain or swelling, muscle, back, or extremity pain  PSYCHIATRIC: No depression, anxiety, or difficulty sleeping  HEME/LYMPH: No easy bruising or bleeding gums  ALLERY AND IMMUNOLOGIC: No hives or rash.      Vital Signs Last 24 Hrs  T(C): 36.7 (02 Jul 2021 16:37), Max: 36.7 (02 Jul 2021 16:37)  T(F): 98 (02 Jul 2021 16:37), Max: 98 (02 Jul 2021 16:37)  HR: 67 (02 Jul 2021 18:16) (61 - 67)  BP: 137/61 (02 Jul 2021 18:16) (109/57 - 144/64)  BP(mean): --  RR: 18 (02 Jul 2021 16:37) (16 - 18)  SpO2: 96% (02 Jul 2021 18:16) (96% - 97%)    PHYSICAL EXAM:    GENERAL: In no apparent distress, well nourished, and hydrated.  HEAD:  Atraumatic, Normocephalic  EYES: EOMI, PERRLA, conjunctiva and sclera clear  ENMT: No tonsillar erythema, exudates, or enlargement; Moist mucous membranes, Good dentition, No lesions  NECK: Supple and normal thyroid.  No JVD or carotid bruit.  Carotid pulse is 2+ bilaterally.  HEART: Regular rate and rhythm;  Pronounced S1  PULMONARY: Crackles 2/3 way up on left side, 1/3-1/2 way up on the right side.  Dull to percussion 2/3 way up on left side and 1/2 way up on right side  ABDOMEN: Soft, Nontender, Nondistended; Bowel sounds present  EXTREMITIES:  1=2+ LE edema  LYMPH: No lymphadenopathy noted  NEUROLOGICAL:  Slightly confused and forgetful.          INTERPRETATION OF TELEMETRY:    ECG:    I&O's Detail      LABS:                        9.8    7.42  )-----------( 147      ( 02 Jul 2021 16:20 )             30.8     07-02    141  |  106  |  12.2  ----------------------------<  108<H>  4.3   |  26.0  |  0.66    Ca    8.9      02 Jul 2021 14:41  Mg     1.9     07-02    TPro  7.1  /  Alb  3.9  /  TBili  0.8  /  DBili  x   /  AST  94<H>  /  ALT  70<H>  /  AlkPhos  129<H>  07-02    CARDIAC MARKERS ( 02 Jul 2021 14:41 )  x     / <0.01 ng/mL / x     / x     / x          PT/INR - ( 02 Jul 2021 14:41 )   PT: 28.2 sec;   INR: 2.54 ratio         PTT - ( 02 Jul 2021 14:41 )  PTT:30.6 sec    BNPSerum Pro-Brain Natriuretic Peptide: 3474 pg/mL (07-02 @ 14:41)    I&O's Detail    Daily Height in cm: 152.4 (02 Jul 2021 12:16)    Daily     RADIOLOGY & ADDITIONAL STUDIES:

## 2021-07-02 NOTE — CONSULT NOTE ADULT - PROBLEM SELECTOR RECOMMENDATION 2
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  Severe dilated cardiomyopathy with LVEF=25 %, RVSP=43 mm Hg.  UvgGIC=9681.    1.  Continue Entresto 49-61 mg po bid  2.  Lasix 20 mg IV q 12 hours.    3.  Daily weights, strict I/Os q shift.

## 2021-07-02 NOTE — H&P ADULT - HISTORY OF PRESENT ILLNESS
72 y/o female pt. with history of  mechanical MVR, cardiomyopathy EF 15%,  AICD, chronic CHF, pAfib, glaucoma, sarcoidosis, hypothyroidism, presenting with chest pain. Pain is pleuritic, substernal/epigastric, nonradiating. Has resting shortness of breath. No history of malignancy, travel or sick contacts. Also complaining of pedal swelling 70 y/o female pt. with history of  mechanical MVR, cardiomyopathy EF 15%,  AICD, chronic CHF, pAfib, glaucoma, sarcoidosis, hypothyroidism, came to the ER for increasing exertional dyspnea for past 3 days, better with rest, no orthopnea, noted more ankle edema.  pleuritic type cp , substernal non radiating. no orthopnea, no sig. cough, no fever. No travel or sick contacts. no abd. pain, no n/v/d.  70 y/o female pt. with history of  mechanical MVR, cardiomyopathy EF 15%,  AICD, chronic CHF, pAfib, glaucoma, sarcoidosis, hypothyroidism, came to the ER for increasing exertional dyspnea for past 3 days, better with rest, no orthopnea, noted more ankle edema.  pleuritic type cp , substernal non radiating. no orthopnea, no sig. cough, no fever. No travel or sick contacts. no abd. pain, no n/v/d. no melena, no hemoptysis, no hematemesis.   As per pt's daughter she is not on mexelitine anymore. pt's cardiologist recently started her on new medicine Verquvo 2.5 mg daily. amiodarone is now 100 mg and levothyroxine 75 mcg daily. pt. alternates 1 mg to 1/2 mg of coumadin daily. no echo in our system but ER resident spoke to pt's cardiologist Dr. Barfield and pt's EF 15 % is reported, Dr. Barfield is recommending 20 mg of iv lasix.

## 2021-07-02 NOTE — CONSULT NOTE ADULT - PROBLEM SELECTOR RECOMMENDATION 9
CXR 7/2/21 bilateral patchy infiltrates with right side being more significant than left side.  H/o systemic and pulmonic sarcoidosis-previously treated with steroids.     Empiric IV antibiotics per medicine.

## 2021-07-02 NOTE — ED ADULT TRIAGE NOTE - CHIEF COMPLAINT QUOTE
patient comes to ed from pcp where she was for inr check but reported chest pain to provider so sent to ed. elisabeth has had epigastric pain x 3 days.

## 2021-07-02 NOTE — ED PROVIDER NOTE - CLINICAL SUMMARY MEDICAL DECISION MAKING FREE TEXT BOX
Pt is a 71 y.o. F hx of cardiomyopathy EF 15%, s/p mechanical MVR, AICD, chronic CHF, pAfib, glaucoma, sarcoidosis, DM, hypothyroidism, presenting with pleuritic chest pain and pedal edema. Dr. Barfield, attending cardiologist, recommends 20 mg IV lasix once per day and echo. Will obtain cultures and give antibiotics.

## 2021-07-02 NOTE — ED PROVIDER NOTE - ATTENDING CONTRIBUTION TO CARE
I personally saw the patient with the resident, and completed the key components of the history and physical exam. I then discussed the management plan with the resident.    70 y/o F presents c/o chest pain, SOB and LE edema, sent in by cardiologist for cardiac work up. She denies fevers, chills, coughing, nausea, vomiting. She has a remote history of sarcoidosis that  states was treated and has resolved. She is on Coumadin due to the valve replacement.     AP - well appearing, median sternotomy scar, RRR, lungs with rhonchi bilateral bases, abd soft NT/ND, 2+ bilateral LE edema, 2+ symmetrical pulses.    Will evaluate for infectious etiology, CXR suspicious for bilateral PNA, although patient has no elevated WBC or fever - will treat empirically with Rocephin and Azithro, admission for echo and further cardiac work up.

## 2021-07-02 NOTE — H&P ADULT - NSHPPHYSICALEXAM_GEN_ALL_CORE
Vital Signs Last 24 Hrs  T(C): 36.7 (02 Jul 2021 16:37), Max: 36.7 (02 Jul 2021 16:37)  T(F): 98 (02 Jul 2021 16:37), Max: 98 (02 Jul 2021 16:37)  HR: 61 (02 Jul 2021 16:37) (61 - 64)  BP: 109/57 (02 Jul 2021 16:37) (109/57 - 144/64)  BP(mean): --  RR: 18 (02 Jul 2021 16:37) (16 - 18)  SpO2: 97% (02 Jul 2021 16:37) (96% - 97%)    General: pt. is a small build female in bed not in distress.  HEENT: AT, NC. PERRL. intact EOM. no throat erythema or exudate.   Neck: supple. no JVD.   Chest: basal crackles bilaterally. no inter costal retractions.   Heart: normal S1,S2. RRR. no heart murmur. + lower ext edema more over B/L ankles.    Abdomen: soft. non-tender. non-distended. + BS.   rectal : deferred by pt.   Ext: no calf tenderness on either side. ROM of all joints intact. distal pulses 2 +.  Neuro: AAO x3. no focal weakness. no speech disorder, cns ii to xii intact.   Skin: no rash noted, warm and dry.   psych : pleasant, co-operative, no si/hi.

## 2021-07-02 NOTE — ED PROVIDER NOTE - PMH
AICD (automatic cardioverter/defibrillator) present    Cataract  bialt  Chronic CHF    Diabetes    Glaucoma    HTN (hypertension)    Hypothyroid    Mitral valve disease    Paroxysmal atrial fibrillation    Sarcoid

## 2021-07-02 NOTE — H&P ADULT - PROBLEM SELECTOR PLAN 2
admit to tele, will get echo, serial trop, monitor I/O, o2 support as needed. iv lasix . continue entresto.   b [place]. cardiology follow up.

## 2021-07-02 NOTE — ED PROVIDER NOTE - PHYSICAL EXAMINATION
General: well appearing, NAD  Head:  NC, AT  Eyes: EOMI, PERRLA, no scleral icterus  Ears: no erythema/drainage  Nose: midline, no bleeding/drainage  Throat: MMM  Cardiac: RRR, no m/r/g, no lower extremity edema  Respiratory: CTABL, no wheezes/rales/rhonchi, equal chest wall expansions, no use of accessory muscles, no retractions  Abdomen: soft, nondistended, nontender, no rebound tenderness, no guarding, nonperitonitic  MSK/Vascular: full ROM, soft compartments, warm extremities, +pedal edema  Neuro: Alert and oriented, motor/sensory intact  Psych: calm, cooperative, normal affect

## 2021-07-03 LAB
A1C WITH ESTIMATED AVERAGE GLUCOSE RESULT: 5.5 % — SIGNIFICANT CHANGE UP (ref 4–5.6)
ALBUMIN SERPL ELPH-MCNC: 3.4 G/DL — SIGNIFICANT CHANGE UP (ref 3.3–5.2)
ALP SERPL-CCNC: 118 U/L — SIGNIFICANT CHANGE UP (ref 40–120)
ALT FLD-CCNC: 87 U/L — HIGH
ANION GAP SERPL CALC-SCNC: 10 MMOL/L — SIGNIFICANT CHANGE UP (ref 5–17)
AST SERPL-CCNC: 111 U/L — HIGH
BASOPHILS # BLD AUTO: 0.07 K/UL — SIGNIFICANT CHANGE UP (ref 0–0.2)
BASOPHILS NFR BLD AUTO: 1.1 % — SIGNIFICANT CHANGE UP (ref 0–2)
BILIRUB SERPL-MCNC: 0.5 MG/DL — SIGNIFICANT CHANGE UP (ref 0.4–2)
BUN SERPL-MCNC: 11.1 MG/DL — SIGNIFICANT CHANGE UP (ref 8–20)
CALCIUM SERPL-MCNC: 8.7 MG/DL — SIGNIFICANT CHANGE UP (ref 8.6–10.2)
CHLORIDE SERPL-SCNC: 105 MMOL/L — SIGNIFICANT CHANGE UP (ref 98–107)
CO2 SERPL-SCNC: 25 MMOL/L — SIGNIFICANT CHANGE UP (ref 22–29)
COVID-19 SPIKE DOMAIN AB INTERP: POSITIVE
COVID-19 SPIKE DOMAIN ANTIBODY RESULT: >250 U/ML — HIGH
CREAT SERPL-MCNC: 0.62 MG/DL — SIGNIFICANT CHANGE UP (ref 0.5–1.3)
EOSINOPHIL # BLD AUTO: 0.29 K/UL — SIGNIFICANT CHANGE UP (ref 0–0.5)
EOSINOPHIL NFR BLD AUTO: 4.5 % — SIGNIFICANT CHANGE UP (ref 0–6)
ESTIMATED AVERAGE GLUCOSE: 111 MG/DL — SIGNIFICANT CHANGE UP (ref 68–114)
FERRITIN SERPL-MCNC: 44 NG/ML — SIGNIFICANT CHANGE UP (ref 15–150)
GLUCOSE SERPL-MCNC: 88 MG/DL — SIGNIFICANT CHANGE UP (ref 70–99)
HCT VFR BLD CALC: 33.2 % — LOW (ref 34.5–45)
HGB BLD-MCNC: 10.3 G/DL — LOW (ref 11.5–15.5)
IMM GRANULOCYTES NFR BLD AUTO: 0.9 % — SIGNIFICANT CHANGE UP (ref 0–1.5)
IRON SATN MFR SERPL: 14 % — SIGNIFICANT CHANGE UP (ref 14–50)
IRON SATN MFR SERPL: 42 UG/DL — SIGNIFICANT CHANGE UP (ref 37–145)
LYMPHOCYTES # BLD AUTO: 1.1 K/UL — SIGNIFICANT CHANGE UP (ref 1–3.3)
LYMPHOCYTES # BLD AUTO: 17.1 % — SIGNIFICANT CHANGE UP (ref 13–44)
MAGNESIUM SERPL-MCNC: 2 MG/DL — SIGNIFICANT CHANGE UP (ref 1.6–2.6)
MCHC RBC-ENTMCNC: 29.9 PG — SIGNIFICANT CHANGE UP (ref 27–34)
MCHC RBC-ENTMCNC: 31 GM/DL — LOW (ref 32–36)
MCV RBC AUTO: 96.2 FL — SIGNIFICANT CHANGE UP (ref 80–100)
MONOCYTES # BLD AUTO: 0.62 K/UL — SIGNIFICANT CHANGE UP (ref 0–0.9)
MONOCYTES NFR BLD AUTO: 9.6 % — SIGNIFICANT CHANGE UP (ref 2–14)
NEUTROPHILS # BLD AUTO: 4.3 K/UL — SIGNIFICANT CHANGE UP (ref 1.8–7.4)
NEUTROPHILS NFR BLD AUTO: 66.8 % — SIGNIFICANT CHANGE UP (ref 43–77)
NT-PROBNP SERPL-SCNC: 3447 PG/ML — HIGH (ref 0–300)
PLATELET # BLD AUTO: 162 K/UL — SIGNIFICANT CHANGE UP (ref 150–400)
POTASSIUM SERPL-MCNC: 3.4 MMOL/L — LOW (ref 3.5–5.3)
POTASSIUM SERPL-SCNC: 3.4 MMOL/L — LOW (ref 3.5–5.3)
PROT SERPL-MCNC: 6.1 G/DL — LOW (ref 6.6–8.7)
RBC # BLD: 3.45 M/UL — LOW (ref 3.8–5.2)
RBC # BLD: 3.45 M/UL — LOW (ref 3.8–5.2)
RBC # FLD: 14.7 % — HIGH (ref 10.3–14.5)
RETICS #: 84.9 K/UL — SIGNIFICANT CHANGE UP (ref 25–125)
RETICS/RBC NFR: 2.5 % — SIGNIFICANT CHANGE UP (ref 0.5–2.5)
SARS-COV-2 IGG+IGM SERPL QL IA: >250 U/ML — HIGH
SARS-COV-2 IGG+IGM SERPL QL IA: POSITIVE
SODIUM SERPL-SCNC: 140 MMOL/L — SIGNIFICANT CHANGE UP (ref 135–145)
TIBC SERPL-MCNC: 299 UG/DL — SIGNIFICANT CHANGE UP (ref 220–430)
TRANSFERRIN SERPL-MCNC: 209 MG/DL — SIGNIFICANT CHANGE UP (ref 192–382)
TROPONIN T SERPL-MCNC: <0.01 NG/ML — SIGNIFICANT CHANGE UP (ref 0–0.06)
WBC # BLD: 6.44 K/UL — SIGNIFICANT CHANGE UP (ref 3.8–10.5)
WBC # FLD AUTO: 6.44 K/UL — SIGNIFICANT CHANGE UP (ref 3.8–10.5)

## 2021-07-03 PROCEDURE — 99233 SBSQ HOSP IP/OBS HIGH 50: CPT

## 2021-07-03 RX ORDER — AMIODARONE HYDROCHLORIDE 400 MG/1
100 TABLET ORAL
Refills: 0 | Status: DISCONTINUED | OUTPATIENT
Start: 2021-07-03 | End: 2021-07-04

## 2021-07-03 RX ORDER — FUROSEMIDE 40 MG
20 TABLET ORAL DAILY
Refills: 0 | Status: DISCONTINUED | OUTPATIENT
Start: 2021-07-03 | End: 2021-07-04

## 2021-07-03 RX ORDER — FUROSEMIDE 40 MG
20 TABLET ORAL
Refills: 0 | Status: DISCONTINUED | OUTPATIENT
Start: 2021-07-03 | End: 2021-07-03

## 2021-07-03 RX ORDER — FUROSEMIDE 40 MG
20 TABLET ORAL ONCE
Refills: 0 | Status: COMPLETED | OUTPATIENT
Start: 2021-07-03 | End: 2021-07-03

## 2021-07-03 RX ORDER — CEFPODOXIME PROXETIL 100 MG
200 TABLET ORAL EVERY 12 HOURS
Refills: 0 | Status: DISCONTINUED | OUTPATIENT
Start: 2021-07-03 | End: 2021-07-04

## 2021-07-03 RX ORDER — AZITHROMYCIN 500 MG/1
500 TABLET, FILM COATED ORAL DAILY
Refills: 0 | Status: DISCONTINUED | OUTPATIENT
Start: 2021-07-03 | End: 2021-07-04

## 2021-07-03 RX ADMIN — AZITHROMYCIN 500 MILLIGRAM(S): 500 TABLET, FILM COATED ORAL at 17:33

## 2021-07-03 RX ADMIN — Medication 1 TABLET(S): at 17:35

## 2021-07-03 RX ADMIN — SACUBITRIL AND VALSARTAN 1 TABLET(S): 24; 26 TABLET, FILM COATED ORAL at 05:21

## 2021-07-03 RX ADMIN — SACUBITRIL AND VALSARTAN 1 TABLET(S): 24; 26 TABLET, FILM COATED ORAL at 17:35

## 2021-07-03 RX ADMIN — Medication 1 TABLET(S): at 09:08

## 2021-07-03 RX ADMIN — Medication 81 MILLIGRAM(S): at 11:59

## 2021-07-03 RX ADMIN — Medication 75 MICROGRAM(S): at 05:21

## 2021-07-03 RX ADMIN — Medication 1 TABLET(S): at 12:00

## 2021-07-03 RX ADMIN — ATORVASTATIN CALCIUM 10 MILLIGRAM(S): 80 TABLET, FILM COATED ORAL at 21:14

## 2021-07-03 RX ADMIN — AMIODARONE HYDROCHLORIDE 100 MILLIGRAM(S): 400 TABLET ORAL at 05:21

## 2021-07-03 RX ADMIN — Medication 200 MILLIGRAM(S): at 21:15

## 2021-07-03 RX ADMIN — AMIODARONE HYDROCHLORIDE 100 MILLIGRAM(S): 400 TABLET ORAL at 17:34

## 2021-07-03 RX ADMIN — Medication 20 MILLIGRAM(S): at 11:58

## 2021-07-03 RX ADMIN — Medication 1 MILLIGRAM(S): at 11:59

## 2021-07-03 RX ADMIN — Medication 25 MILLIGRAM(S): at 05:21

## 2021-07-03 NOTE — PROGRESS NOTE ADULT - ASSESSMENT
71 y.o. female with hyperlipidemia,  systemic sarcoidosis involving heart, eyes, nose, ear treated with Prednisone, pulmonary sarcoidosis (chest CT with IV contrast 11/30/11 demonstrated patchy infiltrates bilaterally, calcified mediastinal/hilar adenopathy) empirically treated with steroids 2-3/2012, s/p TIA 5/16/2011 with 2 days of diploplia, hypothyroidism, s/p hosp for VT storm 10/6/17, s/p hosp 12/29/19 with flu and acute bronchitis, MVP with severe mitral regurgitation, s/p MVR ([facility]) on 10/5/2005, s/p DDD ICD implantation in '99, s/p DDD ICD pulse generator change in 1/8/03, s/p upgrade to biventricular ICD with pulse generator change 9/28/08, s/p biventricular ICD pulse generator change 1/29/14, s/p right lower jaw molar extraction 11/28/16, s/p left eye cataract surgery, who presents with 3 days of chest pain that worsened with walking, lying in bed on her sides, and/or talking and worsening resting and exertional dyspnea.  ROS is also notable for worsening lower extremity swelling.  ProBNP=3,474       Problem/Recommendation - 1:  Problem: Multifocal pneumonia. Recommendation: CXR 7/2/21 bilateral patchy infiltrates with right side being more significant than left side.  H/o systemic and pulmonic sarcoidosis-previously treated with steroids.     Empiric IV antibiotics per medicine.     Problem/Recommendation - 2:  ·  Problem: Chronic systolic congestive heart failure.  Recommendation: Nonischemic dilated cardiomyopathy- resulting from severe MVP and mitral regurgitation- may also have underlying cardiac sarcodosis.  Severe dilated cardiomyopathy with LVEF=25 %, RVSP=43 mm Hg.  GvvJKD=[id number].    1.  Continue Entresto 49-61 mg po bid  2.  Lasix 20 mg IV q 12 hours.    3.  Daily weights, strict I/Os q shift.      Problem/Recommendation - 3:  ·  Problem: Dilated cardiomyopathy.  Recommendation: h/o severe mitral regurgitation and possible cardiac sarcoidosis.      Problem/Recommendation - 4:  ·  Problem: AICD (automatic cardioverter/defibrillator) present.  Recommendation: H/o sustained VT with 2 to 3 episodes of VT storm.      On Amiodarone 100 mg po qd.      Problem/Recommendation - 5:  ·  Problem: Paroxysmal atrial tachycardia.  Recommendation: maintained on chronic anticoagulation with Coumadin.    INR 2 to 3.

## 2021-07-03 NOTE — CHART NOTE - NSCHARTNOTEFT_GEN_A_CORE
PA NOTE-MEDICINE    Called by RN due to pt IV Infiltrating in [facility] and now in Need of IV Access for ABX Treatment for PNA:  Rocephin/Zithro ( Pt received 1st dose of Both)  Pt's Next dose of both ABX is 6 AM  IV placement with Sono attempted x 4-not successful    Will Put on AM Signout for Attending to call Surgery for early morning Midline Placement   Pt NAD  Continue to monitor

## 2021-07-03 NOTE — PROGRESS NOTE ADULT - SUBJECTIVE AND OBJECTIVE BOX
CC:   HPI:  70 y/o female pt. with history of  mechanical MVR, cardiomyopathy EF 15%,  AICD, chronic CHF, pAfib, glaucoma, sarcoidosis, hypothyroidism, came to the ER for increasing exertional dyspnea for past 3 days, better with rest, no orthopnea, noted more ankle edema.  pleuritic type cp , substernal non radiating. no orthopnea, no sig. cough, no fever. No travel or sick contacts. no abd. pain, no n/v/d. no melena, no hemoptysis, no hematemesis.   As per pt's daughter she is not on mexelitine anymore. pt's cardiologist recently started her on new medicine Verquvo 2.5 mg daily. amiodarone is now 100 mg and levothyroxine 75 mcg daily. pt. alternates 1 mg to 1/2 mg of coumadin daily. no echo in our system but ER resident spoke to pt's cardiologist Dr. Barfield and pt's EF 15 % is reported, Dr. Barfield is recommending 20 mg of iv lasix.  (02 Jul 2021 17:19)    REVIEW OF SYSTEMS:    Patient denied fever, chills, abdominal pain, nausea, vomiting, cough, shortness of breath, chest pain or palpitations    Vital Signs Last 24 Hrs  T(C): 36.7 (03 Jul 2021 11:00), Max: 36.7 (02 Jul 2021 16:37)  T(F): 98.1 (03 Jul 2021 11:00), Max: 98.1 (03 Jul 2021 11:00)  HR: 65 (03 Jul 2021 11:00) (60 - 70)  BP: 119/78 (03 Jul 2021 11:00) (109/57 - 137/61)  BP(mean): --  RR: 16 (03 Jul 2021 11:00) (16 - 19)  SpO2: 98% (03 Jul 2021 11:00) (96% - 98%)I&O's Summary    02 Jul 2021 07:01  -  03 Jul 2021 07:00  --------------------------------------------------------  IN: 0 mL / OUT: 690 mL / NET: -690 mL      PHYSICAL EXAM:  GENERAL: NAD, well-groomed  HEENT: PERRL, +EOMI, anicteric, no [place]  NECK: Supple, No JVD   CHEST/LUNG: CTA bilaterally; Normal effort  HEART: S1S2 Normal intensity, no murmurs, gallops or rubs noted  ABDOMEN: Soft, BS Normoactive, NT, ND, no HSM noted  EXTREMITIES:  2+ radial and DP pulses noted, no clubbing, cyanosis, or edema noted, FROM x 4  SKIN: No rashes or lesions noted  NEURO: A&Ox3, no focal deficits noted, CN II-XII intact  PSYCH: normal mood and affect; insight/judgement appropriate  LABS:                        10.3   6.44  )-----------( 162      ( 03 Jul 2021 08:10 )             33.2     07-03    140  |  105  |  11.1  ----------------------------<  88  3.4<L>   |  25.0  |  0.62    Ca    8.7      03 Jul 2021 08:10  Mg     2.0     07-03    TPro  6.1<L>  /  Alb  3.4  /  TBili  0.5  /  DBili  x   /  AST  111<H>  /  ALT  87<H>  /  AlkPhos  118  07-03    PT/INR - ( 02 Jul 2021 14:41 )   PT: 28.2 sec;   INR: 2.54 ratio         PTT - ( 02 Jul 2021 14:41 )  PTT:30.6 sec    RADIOLOGY & ADDITIONAL TESTS:    MEDICATIONS:  MEDICATIONS  (STANDING):  aMIOdarone    Tablet 100 milliGRAM(s) Oral daily  aspirin enteric coated 81 milliGRAM(s) Oral daily  atorvastatin 10 milliGRAM(s) Oral at bedtime  azithromycin  IVPB 500 milliGRAM(s) IV Intermittent every 24 hours  cefTRIAXone   IVPB 1000 milliGRAM(s) IV Intermittent every 24 hours  dorzolamide 2% Ophthalmic Solution 1 Drop(s) Both EYES <User Schedule>  folic acid 1 milliGRAM(s) Oral daily  furosemide   Injectable 20 milliGRAM(s) IV Push two times a day  lactobacillus acidophilus 1 Tablet(s) Oral two times a day with meals  levothyroxine 75 MICROGram(s) Oral daily  metoprolol succinate ER 25 milliGRAM(s) Oral daily  multivitamin 1 Tablet(s) Oral daily  sacubitril 49 mG/valsartan 51 mG 1 Tablet(s) Oral two times a day    MEDICATIONS  (PRN):   CC:  Acute on chronic systolic CHF with low EF, Mechanical  heat valve Mitral, Pedal edema, SOB resolved.  Pneumonia  HPI:  70 y/o female pt. with history of  mechanical MVR, cardiomyopathy EF 15%,  AICD, chronic CHF, pAfib, glaucoma, sarcoidosis, hypothyroidism, came to the ER for increasing exertional dyspnea for past 3 days, better with rest, no orthopnea, noted more ankle edema.  pleuritic type cp , substernal non radiating. no orthopnea, no sig. cough, no fever. No travel or sick contacts. no abd. pain, no n/v/d. no melena, no hemoptysis, no hematemesis.   As per pt's daughter she is not on mexelitine anymore. pt's cardiologist recently started her on new medicine Verquvo 2.5 mg daily. amiodarone is now 100 mg and levothyroxine 75 mcg daily. pt. alternates 1 mg to 1/2 mg of coumadin daily. no echo in our system but ER resident spoke to pt's cardiologist Dr. Barfield and pt's EF 15 % is reported, Dr. Barfield is recommending 20 mg of iv lasix.  (02 Jul 2021 17:19)    REVIEW OF SYSTEMS:    Patient denied fever, chills, abdominal pain, nausea, vomiting, cough, shortness of breath, chest pain or palpitations    Vital Signs Last 24 Hrs  T(C): 36.7 (03 Jul 2021 11:00), Max: 36.7 (02 Jul 2021 16:37)  T(F): 98.1 (03 Jul 2021 11:00), Max: 98.1 (03 Jul 2021 11:00)  HR: 65 (03 Jul 2021 11:00) (60 - 70)  BP: 119/78 (03 Jul 2021 11:00) (109/57 - 137/61)  BP(mean): --  RR: 16 (03 Jul 2021 11:00) (16 - 19)  SpO2: 98% (03 Jul 2021 11:00) (96% - 98%)I&O's Summary    02 Jul 2021 07:01  -  03 Jul 2021 07:00  --------------------------------------------------------  IN: 0 mL / OUT: 690 mL / NET: -690 mL      PHYSICAL EXAM:  GENERAL: NAD, well-groomed  HEENT: PERRL, +EOMI, anicteric, no [place]  NECK: Supple, No JVD   CHEST/LUNG: CTA bilaterally; Normal effort  HEART: S1S2 Normal intensity, no murmurs, gallops or rubs noted  ABDOMEN: Soft, BS Normoactive, NT, ND, no HSM noted  EXTREMITIES:  2+ radial and DP pulses noted, no clubbing, cyanosis, or edema noted, Limited mobility   SKIN: No rashes or lesions noted  NEURO: A&Ox3, no focal deficits noted, CN II-XII intact  PSYCH: normal mood and affect; insight/judgement appropriate  LABS:                        10.3   6.44  )-----------( 162      ( 03 Jul 2021 08:10 )             33.2     07-03    140  |  105  |  11.1  ----------------------------<  88  3.4<L>   |  25.0  |  0.62    Ca    8.7      03 Jul 2021 08:10  Mg     2.0     07-03    TPro  6.1<L>  /  Alb  3.4  /  TBili  0.5  /  DBili  x   /  AST  111<H>  /  ALT  87<H>  /  AlkPhos  118  07-03    PT/INR - ( 02 Jul 2021 14:41 )   PT: 28.2 sec;   INR: 2.54 ratio         PTT - ( 02 Jul 2021 14:41 )  PTT:30.6 sec    RADIOLOGY & ADDITIONAL TESTS:    MEDICATIONS:  MEDICATIONS  (STANDING):  aMIOdarone    Tablet 100 milliGRAM(s) Oral daily  aspirin enteric coated 81 milliGRAM(s) Oral daily  atorvastatin 10 milliGRAM(s) Oral at bedtime  azithromycin  IVPB 500 milliGRAM(s) IV Intermittent every 24 hours  cefTRIAXone   IVPB 1000 milliGRAM(s) IV Intermittent every 24 hours  dorzolamide 2% Ophthalmic Solution 1 Drop(s) Both EYES <User Schedule>  folic acid 1 milliGRAM(s) Oral daily  furosemide   Injectable 20 milliGRAM(s) IV Push two times a day  lactobacillus acidophilus 1 Tablet(s) Oral two times a day with meals  levothyroxine 75 MICROGram(s) Oral daily  metoprolol succinate ER 25 milliGRAM(s) Oral daily  multivitamin 1 Tablet(s) Oral daily  sacubitril 49 mG/valsartan 51 mG 1 Tablet(s) Oral two times a day    MEDICATIONS  (PRN):

## 2021-07-03 NOTE — PROGRESS NOTE ADULT - ASSESSMENT
Problem/Plan - 1:  ·  Problem: Multifocal pneumonia.  Plan: pt. is from home , no hospitalization in last 3 months, will treat as CAP, keep on rocephin and zithromax, follow cultures. pleuritic cp likely pneumonia related. trop negative x 1.      Problem/Plan - 2:  ·  Problem: Chronic systolic congestive heart failure.  Plan: admit to tele, will get echo, serial trop, monitor I/O, o2 support as needed. iv lasix . continue entresto.   b blk. cardiology follow up.      Problem/Plan - 3:  ·  Problem: Mitral valve replaced.  Plan: Mechanical valve replaced, INR 2.54, will continue warfarin, 1 mg today and follow daily pt/ inr.      Problem/Plan - 4:  ·  Problem: Essential hypertension.  Plan: will use toprol xL 25 mg daily for now , bp soft and adjust dose as per response.      Problem/Plan - 5:  ·  Problem: Paroxysmal atrial fibrillation.  Plan: rate controlled, continue warfarin and amiodarone.      Problem/Plan - 6:  Problem: Hypothyroid. Plan: will keep pt. on synthroid 75 mcg as her new dose per pt's family.     Problem/Plan - 7:  ·  Problem: Anemia.  Plan: likely anemia of chronic disease, will send anemia work up, mvi added, close f/u on cbc.      Problem/Plan - 8:  ·  Problem: Transaminitis.  Plan: ast 94, alt 70, passive congestion from chf ? will trend and lower lipitor to 10 mg daily from 20 mg daily.      Problem/Plan - 1:  ·  Problem: Multifocal pneumonia.  Plan: pt. is from home , no hospitalization in last 3 months, will treat as CAP,  Chest CT showing interstitial fibrosis and patchy pneumonia   Ceftriaxone and zithromax, follow cultures.   pleuritic likely pneumonia related. trop negative x 1.      Problem/Plan - 2:  ·  Problem: Chronic systolic congestive heart failure.  Plan:    iv lasix . continue entresto.    cardiology follow up.      Problem/Plan - 3:  ·  Problem: Mitral valve replaced.  Plan: Mechanical valve replaced, INR 2.54, will continue warfarin, 1 mg today and follow daily pt/ inr.      Problem/Plan - 4:  ·  Problem: Essential hypertension.  Plan: will use toprol xL 25 mg daily.      Problem/Plan - 5:  ·  Problem: Paroxysmal atrial fibrillation.  Plan: rate controlled, continue warfarin and amiodarone.      Problem/Plan - 6:  Problem: Hypothyroid. Plan: will keep pt. on synthroid 75 mcg .     Problem/Plan - 7:  ·  Problem: Anemia.  Plan: likely anemia of chronic disease,   Anemia work show no iron deficiency   Continue  mvi     Problem/Plan - 8:  ·  Problem: Transaminitis.  Plan: ast 94, alt 70, passive congestion from chf ? will trend and lower lipitor to 10 mg daily from 20 mg daily.

## 2021-07-03 NOTE — PROGRESS NOTE ADULT - SUBJECTIVE AND OBJECTIVE BOX
Patient is a 71y old  Female who presents with a chief complaint of chf / pneumonia (2021 13:14)        PAST MEDICAL & SURGICAL HISTORY:  Hypothyroid    Diabetes  pt. stated that she is not DM and does not use any meds.    HTN (hypertension)    Cataract  bialt    Mitral valve disease    AICD (automatic cardioverter/defibrillator) present    Sarcoid    Glaucoma    Paroxysmal atrial fibrillation    Chronic CHF    Elective surgery  back surgury, mitral valve replcement. AICD    H/O mitral valve replacement        Summary of admission HPI:  71 y.o. female with hyperlipidemia,  systemic sarcoidosis involving heart, eyes, nose, ear treated with Prednisone, pulmonary sarcoidosis (chest CT with IV contrast 11 demonstrated patchy infiltrates bilaterally, calcified mediastinal/hilar adenopathy) empirically treated with steroids -3/2012, s/p TIA 2011 with 2 days of diploplia, hypothyroidism, s/p hosp for VT storm 10/6/17, s/p hosp 19 with flu and acute bronchitis, MVP with severe mitral regurgitation, s/p MVR ([facility]) on 10/5/2005, s/p DDD ICD implantation in , s/p DDD ICD pulse generator change in 03, s/p upgrade to biventricular ICD with pulse generator change 08, s/p biventricular ICD pulse generator change 14, s/p right lower jaw molar extraction 16, s/p left eye cataract surgery, who presents with 3 days of chest pain that worsened with walking, lying in bed on her sides, and/or talking and worsening resting and exertional dyspnea.  ROS is also notable for worsening lower extremity swelling.  ProBNP=3,474            PREVIOUS DIAGNOSTIC TESTING:    ECHO:         CXR: 21  bilateral patchy infiltrates at bases (right side worse than left side)    other Radiology studies:      MEDICATIONS  (STANDING):  aMIOdarone    Tablet 100 milliGRAM(s) Oral daily  aspirin enteric coated 81 milliGRAM(s) Oral daily  atorvastatin 10 milliGRAM(s) Oral at bedtime  azithromycin  IVPB 500 milliGRAM(s) IV Intermittent every 24 hours  cefTRIAXone   IVPB 1000 milliGRAM(s) IV Intermittent every 24 hours  dorzolamide 2% Ophthalmic Solution 1 Drop(s) Both EYES <User Schedule>  folic acid 1 milliGRAM(s) Oral daily  furosemide   Injectable 20 milliGRAM(s) IV Push two times a day  lactobacillus acidophilus 1 Tablet(s) Oral two times a day with meals  levothyroxine 75 MICROGram(s) Oral daily  metoprolol succinate ER 25 milliGRAM(s) Oral daily  multivitamin 1 Tablet(s) Oral daily  sacubitril 49 mG/valsartan 51 mG 1 Tablet(s) Oral two times a day    MEDICATIONS  (PRN):      FAMILY HISTORY:  No pertinent family history in first degree relatives              Vital Signs Last 24 Hrs  T(C): 36.7 (2021 11:00), Max: 36.7 (2021 16:37)  T(F): 98.1 (2021 11:00), Max: 98.1 (2021 11:00)  HR: 65 (2021 11:00) (60 - 70)  BP: 119/78 (2021 11:00) (109/57 - 137/61)  BP(mean): --  RR: 16 (2021 11:00) (16 - 19)  SpO2: 98% (2021 11:00) (96% - 98%)    PHYSICAL EXAM:    GENERAL: Elderly and frial.  HEAD:  Atraumatic, Normocephalic  EYES: EOMI, PERRLA, conjunctiva and sclera clear  ENMT: No tonsillar erythema, exudates, or enlargement; Moist mucous membranes, Good dentition, No lesions  NECK: Supple and normal thyroid.  No JVD or carotid bruit.  Carotid pulse is 2+ bilaterally.  HEART: Regular rate and rhythm; metallic S1, No murmurs, rubs, or gallops.  PULMONARY: Crackles left side 1/2 way up and right basilar crackles.  ABDOMEN: Soft, Nontender, Nondistended; Bowel sounds present  EXTREMITIES: 1+ Peripheral Pulses, No clubbing, cyanosis, or edema  NEUROLOGICAL: Grossly nonfocal          INTERPRETATION OF TELEMETRY:    ECG:    I&O's Detail    2021 07:01  -  2021 07:00  --------------------------------------------------------  IN:  Total IN: 0 mL    OUT:    Voided (mL): 690 mL  Total OUT: 690 mL    Total NET: -690 mL          LABS:                        10.3   6.44  )-----------( 162      ( 2021 08:10 )             33.2     07-03    140  |  105  |  11.1  ----------------------------<  88  3.4<L>   |  25.0  |  0.62    Ca    8.7      2021 08:10  Mg     2.0     07-03    TPro  6.1<L>  /  Alb  3.4  /  TBili  0.5  /  DBili  x   /  AST  111<H>  /  ALT  87<H>  /  AlkPhos  118  07-03    CARDIAC MARKERS ( 2021 08:10 )  CKx     / CKMBx     / Troponin T<0.01 ng/mL /  CARDIAC MARKERS ( 2021 22:52 )  CKx     / CKMBx     / Troponin T<0.01 ng/mL /      PT/INR - ( 2021 14:41 )   PT: 28.2 sec;   INR: 2.54 ratio         PTT - ( 2021 14:41 )  PTT:30.6 sec    BNP  Daily     Daily Weight in k.2 (2021 04:31)

## 2021-07-03 NOTE — PROGRESS NOTE ADULT - PROBLEM SELECTOR PLAN 3
H/o VT storm-[facility] refused to perform VT ablation.  Patient also has paroxysmal atrial tachycardia/fibrillation.    Recommendations:    1.  Decrease Amiodarone 100 mg po qd to Mon, Wed, Fri, and Sat, due to pulmonary fibrosis.  2.  Outpatient ICD pulse generator change for SONIA battery status, once she is stable from a pulmonary perspective.

## 2021-07-03 NOTE — PROGRESS NOTE ADULT - PROBLEM SELECTOR PLAN 1
CXR 7/2/21 bilateral patchy infiltrates with right side being more significant than left side.  H/o systemic and pulmonic sarcoidosis-previously treated with steroids.  Noncontrast CT 7/2/21 demonstrates interstial fibrosis, bilateral patchy airway disease, and unchanged calcified bilateral mediastinal and hilar calcification.    Recommendations:    1.  Consider pulmonary consultation, due to possible multi process going on in the lung (i.e. pulmonary fibrosis, sarcoidosis, and/or pneumonia.  2.  Awaiting port placement, for intravenous access for antibiotics.

## 2021-07-03 NOTE — PROGRESS NOTE ADULT - PROBLEM SELECTOR PLAN 2
Severe dilated cardiomyopathy with LVEF=25-30 %/chronic systolic CHF.  ProBNP 3434 on 7/2/21    Change Lasix 20 mg IV bid to 20 mg po qd.

## 2021-07-04 ENCOUNTER — TRANSCRIPTION ENCOUNTER (OUTPATIENT)
Age: 71
End: 2021-07-04

## 2021-07-04 VITALS
OXYGEN SATURATION: 98 % | TEMPERATURE: 97 F | DIASTOLIC BLOOD PRESSURE: 60 MMHG | RESPIRATION RATE: 14 BRPM | HEART RATE: 60 BPM | SYSTOLIC BLOOD PRESSURE: 103 MMHG

## 2021-07-04 LAB
ANION GAP SERPL CALC-SCNC: 9 MMOL/L — SIGNIFICANT CHANGE UP (ref 5–17)
BUN SERPL-MCNC: 12.4 MG/DL — SIGNIFICANT CHANGE UP (ref 8–20)
CALCIUM SERPL-MCNC: 8.4 MG/DL — LOW (ref 8.6–10.2)
CHLORIDE SERPL-SCNC: 107 MMOL/L — SIGNIFICANT CHANGE UP (ref 98–107)
CO2 SERPL-SCNC: 26 MMOL/L — SIGNIFICANT CHANGE UP (ref 22–29)
CREAT SERPL-MCNC: 0.71 MG/DL — SIGNIFICANT CHANGE UP (ref 0.5–1.3)
GLUCOSE SERPL-MCNC: 114 MG/DL — HIGH (ref 70–99)
HCT VFR BLD CALC: 32.2 % — LOW (ref 34.5–45)
HGB BLD-MCNC: 10.4 G/DL — LOW (ref 11.5–15.5)
MCHC RBC-ENTMCNC: 30.4 PG — SIGNIFICANT CHANGE UP (ref 27–34)
MCHC RBC-ENTMCNC: 32.3 GM/DL — SIGNIFICANT CHANGE UP (ref 32–36)
MCV RBC AUTO: 94.2 FL — SIGNIFICANT CHANGE UP (ref 80–100)
PLATELET # BLD AUTO: 159 K/UL — SIGNIFICANT CHANGE UP (ref 150–400)
POTASSIUM SERPL-MCNC: 3.7 MMOL/L — SIGNIFICANT CHANGE UP (ref 3.5–5.3)
POTASSIUM SERPL-SCNC: 3.7 MMOL/L — SIGNIFICANT CHANGE UP (ref 3.5–5.3)
RBC # BLD: 3.42 M/UL — LOW (ref 3.8–5.2)
RBC # FLD: 14.6 % — HIGH (ref 10.3–14.5)
SODIUM SERPL-SCNC: 142 MMOL/L — SIGNIFICANT CHANGE UP (ref 135–145)
WBC # BLD: 7.14 K/UL — SIGNIFICANT CHANGE UP (ref 3.8–10.5)
WBC # FLD AUTO: 7.14 K/UL — SIGNIFICANT CHANGE UP (ref 3.8–10.5)

## 2021-07-04 PROCEDURE — 93306 TTE W/DOPPLER COMPLETE: CPT | Mod: 26

## 2021-07-04 PROCEDURE — 99239 HOSP IP/OBS DSCHRG MGMT >30: CPT

## 2021-07-04 RX ORDER — SACUBITRIL AND VALSARTAN 24; 26 MG/1; MG/1
1 TABLET, FILM COATED ORAL
Qty: 60 | Refills: 0
Start: 2021-07-04 | End: 2021-08-02

## 2021-07-04 RX ORDER — LACTOBACILLUS ACIDOPHILUS 100MM CELL
1 CAPSULE ORAL
Qty: 14 | Refills: 0
Start: 2021-07-04 | End: 2021-07-10

## 2021-07-04 RX ORDER — ALBUTEROL 90 UG/1
2 AEROSOL, METERED ORAL
Qty: 1 | Refills: 0
Start: 2021-07-04 | End: 2021-08-02

## 2021-07-04 RX ORDER — ASPIRIN/CALCIUM CARB/MAGNESIUM 324 MG
1 TABLET ORAL
Qty: 30 | Refills: 0
Start: 2021-07-04 | End: 2021-08-02

## 2021-07-04 RX ORDER — FOLIC ACID 0.8 MG
1 TABLET ORAL
Qty: 0 | Refills: 0 | DISCHARGE
Start: 2021-07-04

## 2021-07-04 RX ORDER — MEXILETINE HYDROCHLORIDE 150 MG/1
1 CAPSULE ORAL
Qty: 0 | Refills: 0 | DISCHARGE

## 2021-07-04 RX ORDER — FUROSEMIDE 40 MG
1 TABLET ORAL
Qty: 0 | Refills: 0 | DISCHARGE

## 2021-07-04 RX ORDER — FUROSEMIDE 40 MG
1 TABLET ORAL
Qty: 30 | Refills: 0
Start: 2021-07-04 | End: 2021-08-02

## 2021-07-04 RX ORDER — AMIODARONE HYDROCHLORIDE 400 MG/1
0.5 TABLET ORAL
Qty: 60 | Refills: 0
Start: 2021-07-04

## 2021-07-04 RX ORDER — SACUBITRIL AND VALSARTAN 24; 26 MG/1; MG/1
1 TABLET, FILM COATED ORAL
Qty: 0 | Refills: 0 | DISCHARGE
Start: 2021-07-04 | End: 2021-08-02

## 2021-07-04 RX ORDER — LEVOTHYROXINE SODIUM 125 MCG
1 TABLET ORAL
Qty: 60 | Refills: 0
Start: 2021-07-04 | End: 2021-09-01

## 2021-07-04 RX ORDER — FOLIC ACID 0.8 MG
1 TABLET ORAL
Qty: 0 | Refills: 0 | DISCHARGE

## 2021-07-04 RX ORDER — METOPROLOL TARTRATE 50 MG
1 TABLET ORAL
Qty: 30 | Refills: 0
Start: 2021-07-04 | End: 2021-08-02

## 2021-07-04 RX ORDER — LEVOTHYROXINE SODIUM 125 MCG
1 TABLET ORAL
Qty: 0 | Refills: 0 | DISCHARGE

## 2021-07-04 RX ORDER — METOPROLOL TARTRATE 50 MG
1 TABLET ORAL
Qty: 0 | Refills: 0 | DISCHARGE

## 2021-07-04 RX ORDER — CEFPODOXIME PROXETIL 100 MG
1 TABLET ORAL
Qty: 10 | Refills: 0
Start: 2021-07-04 | End: 2021-07-08

## 2021-07-04 RX ORDER — AMIODARONE HYDROCHLORIDE 400 MG/1
0.5 TABLET ORAL
Qty: 0 | Refills: 0 | DISCHARGE
Start: 2021-07-04

## 2021-07-04 RX ORDER — ATORVASTATIN CALCIUM 80 MG/1
1 TABLET, FILM COATED ORAL
Qty: 0 | Refills: 0 | DISCHARGE

## 2021-07-04 RX ORDER — SACUBITRIL AND VALSARTAN 24; 26 MG/1; MG/1
1 TABLET, FILM COATED ORAL
Qty: 0 | Refills: 0 | DISCHARGE

## 2021-07-04 RX ORDER — AZITHROMYCIN 500 MG/1
1 TABLET, FILM COATED ORAL
Qty: 5 | Refills: 0
Start: 2021-07-04 | End: 2021-07-08

## 2021-07-04 RX ORDER — ATORVASTATIN CALCIUM 80 MG/1
1 TABLET, FILM COATED ORAL
Qty: 30 | Refills: 0
Start: 2021-07-04 | End: 2021-08-02

## 2021-07-04 RX ADMIN — Medication 20 MILLIGRAM(S): at 05:16

## 2021-07-04 RX ADMIN — Medication 75 MICROGRAM(S): at 05:16

## 2021-07-04 RX ADMIN — SACUBITRIL AND VALSARTAN 1 TABLET(S): 24; 26 TABLET, FILM COATED ORAL at 05:16

## 2021-07-04 RX ADMIN — Medication 200 MILLIGRAM(S): at 05:16

## 2021-07-04 RX ADMIN — Medication 25 MILLIGRAM(S): at 05:16

## 2021-07-04 NOTE — DISCHARGE NOTE PROVIDER - HOSPITAL COURSE
Acute on chronic systolic CHF with low EF, Mechanical  heat valve Mitral, Pedal edema, SOB resolved.  Pneumonia  HPI:  70 y/o female pt. with history of  mechanical MVR, cardiomyopathy EF 15%,  AICD, chronic CHF, pAfib, glaucoma, sarcoidosis, hypothyroidism, came to the ER for increasing exertional dyspnea for past 3 days, better with rest, no orthopnea, noted more ankle edema.  pleuritic type cp , substernal non radiating. no orthopnea, no sig. cough, no fever. No travel or sick contacts. no abd. pain, no n/v/d. no melena, no hemoptysis, no hematemesis.   As per pt's daughter she is not on mexelitine anymore. pt's cardiologist recently started her on new medicine Verquvo 2.5 mg daily. amiodarone is now 100 mg and levothyroxine 75 mcg daily. pt. alternates 1 mg to 1/2 mg of coumadin daily. no echo in our system but ER resident spoke to pt's cardiologist Dr. Barfield and pt's EF 15 % is reported, Dr. Barfield is recommending 20 mg of iv lasix.  (02 Jul 2021 17:19)    Problem/Plan - 1:  ·  Problem: Multifocal pneumonia.  Plan: pt. is from home , no hospitalization in last 3 months, will treat as CAP,  Chest CT showing interstitial fibrosis and patchy pneumonia   Ceftriaxone and zithromax, follow cultures.   pleuritic likely pneumonia related. trop negative x 1.      Problem/Plan - 2:  ·  Problem: Chronic systolic congestive heart failure.  Plan:    iv lasix . continue entresto.    cardiology follow up.      Problem/Plan - 3:  ·  Problem: Mitral valve replaced.  Plan: Mechanical valve replaced, INR 2.54, will continue warfarin, 1 mg today and follow daily pt/ inr.      Problem/Plan - 4:  ·  Problem: Essential hypertension.  Plan: will use toprol xL 25 mg daily.      Problem/Plan - 5:  ·  Problem: Paroxysmal atrial fibrillation.  Plan: rate controlled, continue warfarin and amiodarone.      Problem/Plan - 6:  Problem: Hypothyroid. Plan: will keep pt. on synthroid 75 mcg .     Problem/Plan - 7:  ·  Problem: Anemia.  Plan: likely anemia of chronic disease,   Anemia work show no iron deficiency   Continue  mvi     Problem/Plan - 8:  ·  Problem: Transaminitis.  Plan: ast 94, alt 70, passive congestion from chf ? will trend and lower lipitor to 10 mg daily from 20 mg daily.        72 y/o female pt. with history of  mechanical MVR, cardiomyopathy EF 15%,  AICD, chronic CHF, pAfib, glaucoma, sarcoidosis, hypothyroidism, came to the ER for increasing exertional dyspnea for past 3 days, better with rest, no orthopnea, noted more ankle edema.  pleuritic type cp , substernal non radiating. no orthopnea, no sig. cough, no fever. No travel or sick contacts. no abd. pain, no n/v/d. no melena, no hemoptysis, no hematemesis.   As per pt's daughter she is not on mexelitine anymore. pt's cardiologist recently started her on new medicine Verquvo 2.5 mg daily. amiodarone is now 100 mg and levothyroxine 75 mcg daily. pt. alternates 1 mg to 1/2 mg of coumadin daily. no echo in our system but ER resident spoke to pt's cardiologist Dr. Barfield and pt's EF 15 % is reported, Dr. Barfield is recommending 20 mg of iv lasix.  (02 Jul 2021 17:19)    ·  Problem: Multifocal pneumonia.  Plan: pt. is from home , no hospitalization in last 3 months, treated as  CAP,  Chest CT showing interstitial fibrosis and patchy pneumonia   Ceftriaxone and zithromax,   Home with 5 more days of PO zithromax and vantin  Follow pulmonology dr Vini Mims outpatient     ·  Problem: Chronic systolic congestive heart failure.  Plan:    Lasix 20mg po daily    continue entresto.    cardiology follow up in 1-2 weeks outpatient     ·  Problem: Mitral valve replaced.  Plan: Mechanical valve replaced, INR 2.54, will continue warfarin, 1 mg  daily follow  cardiology for pt/ inr.     ·  Problem: Essential hypertension.  Plan: will use toprol xL 25 mg daily.     ·  Problem: Paroxysmal atrial fibrillation.  Plan: rate controlled, continue warfarin   amiodarone  100mg po M W F S.     Problem: Hypothyroid. Plan:  keep  synthroid 75 mcg .

## 2021-07-04 NOTE — DISCHARGE NOTE NURSING/CASE MANAGEMENT/SOCIAL WORK - NSDCVIVACCINE_GEN_ALL_CORE_FT
influenza, injectable, quadrivalent, preservative free; 11-Oct-2017 11:25; Marielle Cody (RN); [employer]; [id number]; IntraMuscular; Deltoid Right.; 0.5 milliLiter(s); VIS (VIS Published: 07-Aug-2015, VIS Presented: 11-Oct-2017);

## 2021-07-04 NOTE — DISCHARGE NOTE PROVIDER - NSDCMRMEDTOKEN_GEN_ALL_CORE_FT
Acidophilus oral capsule: 1 cap(s) orally 2 times a day   albuterol 90 mcg/inh inhalation aerosol: 2 puff(s) inhaled 4 times a day, As Needed -for shortness of breath and/or wheezing   amiodarone 200 mg oral tablet: 0.5 tab(s) orally 4 times a week MDD:take Monday Wednesday Friday Saturday  aspirin 81 mg oral delayed release tablet: 1 tab(s) orally once a day  aspirin 81 mg oral tablet, chewable: 1 tab(s) chewed once a day   atorvastatin 10 mg oral tablet: 1 tab(s) orally once a day (at bedtime)  azithromycin 500 mg oral tablet: 1 tab(s) orally once a day   cefpodoxime 200 mg oral tablet: 1 tab(s) orally every 12 hours  dorzolamide 2% ophthalmic solution: 1 drop(s) to each affected eye once a day  folic acid 1 mg oral tablet: 1 tab(s) orally once a day  furosemide 20 mg oral tablet: 1 tab(s) orally once a day   levothyroxine 125 mcg (0.125 mg) oral tablet: 1 tab(s) orally once a day  metoprolol succinate 25 mg oral tablet, extended release: 1 tab(s) orally once a day  Multiple Vitamins oral tablet: 1 tab(s) orally once a day  sacubitril-valsartan 49 mg-51 mg oral tablet: 1 tab(s) orally 2 times a day  warfarin 1 mg oral tablet: 1 tab(s) orally once a day

## 2021-07-04 NOTE — DISCHARGE NOTE PROVIDER - PROVIDER TOKENS
PROVIDER:[TOKEN:[[id number]],FOLLOWUP:[1 week],ESTABLISHEDPATIENT:[T]],PROVIDER:[TOKEN:[[id number]],FOLLOWUP:[1 week]]

## 2021-07-04 NOTE — DISCHARGE NOTE PROVIDER - NSDCCPCAREPLAN_GEN_ALL_CORE_FT
PRINCIPAL DISCHARGE DIAGNOSIS  Diagnosis: Pneumonia due to infectious organism, unspecified laterality, unspecified part of lung  Assessment and Plan of Treatment:       SECONDARY DISCHARGE DIAGNOSES  Diagnosis: Chronic systolic congestive heart failure  Assessment and Plan of Treatment: Chronic systolic congestive heart failure

## 2021-07-04 NOTE — DISCHARGE NOTE PROVIDER - CARE PROVIDER_API CALL
Fany Barfield)  Cardiac Electrophysiology; Cardiovascular Disease; Internal Medicine  [address] [place] [address]  Phone: [phone]  Fax: [phone]  Established Patient  Follow Up Time: 1 week    Vini Mims)  Internal Medicine; Pulmonary Disease  [address] [place] [address]  Phone: [phone]  Fax: [phone]  Follow Up Time: 1 week

## 2021-07-04 NOTE — DISCHARGE NOTE PROVIDER - CARE PROVIDERS DIRECT ADDRESSES
,DirectAddress_Unknown,[email or web address]

## 2021-07-04 NOTE — DISCHARGE NOTE NURSING/CASE MANAGEMENT/SOCIAL WORK - PATIENT PORTAL LINK FT
You can access the FollowMyHealth Patient Portal offered by [facility] by registering at the following website: [email or web address]. By joining [employer]’s FollowMyHealth portal, you will also be able to view your health information using other applications (apps) compatible with our system.

## 2021-07-07 LAB
CULTURE RESULTS: SIGNIFICANT CHANGE UP
CULTURE RESULTS: SIGNIFICANT CHANGE UP
SPECIMEN SOURCE: SIGNIFICANT CHANGE UP
SPECIMEN SOURCE: SIGNIFICANT CHANGE UP

## 2021-09-28 ENCOUNTER — APPOINTMENT (OUTPATIENT)
Dept: DISASTER EMERGENCY | Facility: CLINIC | Age: 71
End: 2021-09-28

## 2021-09-28 DIAGNOSIS — Z01.818 ENCOUNTER FOR OTHER PREPROCEDURAL EXAMINATION: ICD-10-CM

## 2021-09-29 LAB — SARS-COV-2 N GENE NPH QL NAA+PROBE: NOT DETECTED

## 2021-09-30 ENCOUNTER — TRANSCRIPTION ENCOUNTER (OUTPATIENT)
Age: 71
End: 2021-09-30

## 2021-09-30 ENCOUNTER — OUTPATIENT (OUTPATIENT)
Dept: OUTPATIENT SERVICES | Facility: HOSPITAL | Age: 71
LOS: 1 days | Discharge: HOME | End: 2021-09-30
Payer: MEDICARE

## 2021-09-30 VITALS
HEART RATE: 66 BPM | HEIGHT: 60 IN | WEIGHT: 128.09 LBS | OXYGEN SATURATION: 99 % | SYSTOLIC BLOOD PRESSURE: 118 MMHG | RESPIRATION RATE: 16 BRPM | DIASTOLIC BLOOD PRESSURE: 54 MMHG

## 2021-09-30 VITALS
RESPIRATION RATE: 16 BRPM | HEART RATE: 60 BPM | DIASTOLIC BLOOD PRESSURE: 56 MMHG | SYSTOLIC BLOOD PRESSURE: 125 MMHG | OXYGEN SATURATION: 100 %

## 2021-09-30 DIAGNOSIS — Z95.2 PRESENCE OF PROSTHETIC HEART VALVE: Chronic | ICD-10-CM

## 2021-09-30 DIAGNOSIS — Z41.9 ENCOUNTER FOR PROCEDURE FOR PURPOSES OTHER THAN REMEDYING HEALTH STATE, UNSPECIFIED: Chronic | ICD-10-CM

## 2021-09-30 DIAGNOSIS — Z95.810 PRESENCE OF AUTOMATIC (IMPLANTABLE) CARDIAC DEFIBRILLATOR: Chronic | ICD-10-CM

## 2021-09-30 DIAGNOSIS — I47.2 VENTRICULAR TACHYCARDIA: ICD-10-CM

## 2021-09-30 LAB
ANION GAP SERPL CALC-SCNC: 11 MMOL/L — SIGNIFICANT CHANGE UP (ref 5–17)
APTT BLD: 42.4 SEC — HIGH (ref 27.5–35.5)
BLD GP AB SCN SERPL QL: SIGNIFICANT CHANGE UP
BUN SERPL-MCNC: 17.1 MG/DL — SIGNIFICANT CHANGE UP (ref 8–20)
CALCIUM SERPL-MCNC: 9 MG/DL — SIGNIFICANT CHANGE UP (ref 8.6–10.2)
CHLORIDE SERPL-SCNC: 106 MMOL/L — SIGNIFICANT CHANGE UP (ref 98–107)
CO2 SERPL-SCNC: 23 MMOL/L — SIGNIFICANT CHANGE UP (ref 22–29)
CREAT SERPL-MCNC: 0.83 MG/DL — SIGNIFICANT CHANGE UP (ref 0.5–1.3)
GLUCOSE SERPL-MCNC: 107 MG/DL — HIGH (ref 70–99)
HCT VFR BLD CALC: 32.9 % — LOW (ref 34.5–45)
HGB BLD-MCNC: 10.2 G/DL — LOW (ref 11.5–15.5)
INR BLD: 2.19 RATIO — HIGH (ref 0.88–1.16)
MAGNESIUM SERPL-MCNC: 2.1 MG/DL — SIGNIFICANT CHANGE UP (ref 1.6–2.6)
MCHC RBC-ENTMCNC: 28.7 PG — SIGNIFICANT CHANGE UP (ref 27–34)
MCHC RBC-ENTMCNC: 31 GM/DL — LOW (ref 32–36)
MCV RBC AUTO: 92.4 FL — SIGNIFICANT CHANGE UP (ref 80–100)
PLATELET # BLD AUTO: 182 K/UL — SIGNIFICANT CHANGE UP (ref 150–400)
POTASSIUM SERPL-MCNC: 4.3 MMOL/L — SIGNIFICANT CHANGE UP (ref 3.5–5.3)
POTASSIUM SERPL-SCNC: 4.3 MMOL/L — SIGNIFICANT CHANGE UP (ref 3.5–5.3)
PROTHROM AB SERPL-ACNC: 24.5 SEC — HIGH (ref 10.6–13.6)
RBC # BLD: 3.56 M/UL — LOW (ref 3.8–5.2)
RBC # FLD: 15.9 % — HIGH (ref 10.3–14.5)
SODIUM SERPL-SCNC: 140 MMOL/L — SIGNIFICANT CHANGE UP (ref 135–145)
WBC # BLD: 7.59 K/UL — SIGNIFICANT CHANGE UP (ref 3.8–10.5)
WBC # FLD AUTO: 7.59 K/UL — SIGNIFICANT CHANGE UP (ref 3.8–10.5)

## 2021-09-30 PROCEDURE — 83036 HEMOGLOBIN GLYCOSYLATED A1C: CPT

## 2021-09-30 PROCEDURE — 86901 BLOOD TYPING SEROLOGIC RH(D): CPT

## 2021-09-30 PROCEDURE — 93005 ELECTROCARDIOGRAM TRACING: CPT

## 2021-09-30 PROCEDURE — 83550 IRON BINDING TEST: CPT

## 2021-09-30 PROCEDURE — 36415 COLL VENOUS BLD VENIPUNCTURE: CPT

## 2021-09-30 PROCEDURE — 83735 ASSAY OF MAGNESIUM: CPT

## 2021-09-30 PROCEDURE — 86900 BLOOD TYPING SEROLOGIC ABO: CPT

## 2021-09-30 PROCEDURE — 86922 COMPATIBILITY TEST ANTIGLOB: CPT

## 2021-09-30 PROCEDURE — 83690 ASSAY OF LIPASE: CPT

## 2021-09-30 PROCEDURE — 83540 ASSAY OF IRON: CPT

## 2021-09-30 PROCEDURE — 86769 SARS-COV-2 COVID-19 ANTIBODY: CPT

## 2021-09-30 PROCEDURE — 83605 ASSAY OF LACTIC ACID: CPT

## 2021-09-30 PROCEDURE — 93010 ELECTROCARDIOGRAM REPORT: CPT

## 2021-09-30 PROCEDURE — 87040 BLOOD CULTURE FOR BACTERIA: CPT

## 2021-09-30 PROCEDURE — 87635 SARS-COV-2 COVID-19 AMP PRB: CPT

## 2021-09-30 PROCEDURE — 80048 BASIC METABOLIC PNL TOTAL CA: CPT

## 2021-09-30 PROCEDURE — 71046 X-RAY EXAM CHEST 2 VIEWS: CPT

## 2021-09-30 PROCEDURE — C1882: CPT

## 2021-09-30 PROCEDURE — 84466 ASSAY OF TRANSFERRIN: CPT

## 2021-09-30 PROCEDURE — 85730 THROMBOPLASTIN TIME PARTIAL: CPT

## 2021-09-30 PROCEDURE — 84484 ASSAY OF TROPONIN QUANT: CPT

## 2021-09-30 PROCEDURE — 85045 AUTOMATED RETICULOCYTE COUNT: CPT

## 2021-09-30 PROCEDURE — 80053 COMPREHEN METABOLIC PANEL: CPT

## 2021-09-30 PROCEDURE — 83880 ASSAY OF NATRIURETIC PEPTIDE: CPT

## 2021-09-30 PROCEDURE — 71250 CT THORAX DX C-: CPT

## 2021-09-30 PROCEDURE — 85610 PROTHROMBIN TIME: CPT

## 2021-09-30 PROCEDURE — 86850 RBC ANTIBODY SCREEN: CPT

## 2021-09-30 PROCEDURE — C8929: CPT

## 2021-09-30 PROCEDURE — 99285 EMERGENCY DEPT VISIT HI MDM: CPT

## 2021-09-30 PROCEDURE — C1889: CPT

## 2021-09-30 PROCEDURE — 85379 FIBRIN DEGRADATION QUANT: CPT

## 2021-09-30 PROCEDURE — 85025 COMPLETE CBC W/AUTO DIFF WBC: CPT

## 2021-09-30 PROCEDURE — 33264 RMVL & RPLCMT DFB GEN MLT LD: CPT

## 2021-09-30 PROCEDURE — 85027 COMPLETE CBC AUTOMATED: CPT

## 2021-09-30 PROCEDURE — 82728 ASSAY OF FERRITIN: CPT

## 2021-09-30 RX ORDER — DORZOLAMIDE HYDROCHLORIDE 20 MG/ML
1 SOLUTION/ DROPS OPHTHALMIC
Qty: 0 | Refills: 0 | DISCHARGE

## 2021-09-30 RX ORDER — CEPHALEXIN 500 MG
1 CAPSULE ORAL
Qty: 8 | Refills: 0
Start: 2021-09-30 | End: 2021-10-03

## 2021-09-30 NOTE — H&P PST ADULT - NSICDXPASTMEDICALHX_GEN_ALL_CORE_FT
PAST MEDICAL HISTORY:  Cataract     Diabetes     Glaucoma     HLD (hyperlipidemia)     HTN (hypertension)     Hypothyroid     Mitral valve disease s/p MVR    Nonischemic cardiomyopathy EF <20%; DDD ICD '99; gen change '03, BiV upgrade '08, gen change '2014    Paroxysmal atrial fibrillation     Sarcoid involving lungs, heart, ears and eyes    Systolic heart failure     VT (ventricular tachycardia) [facility] refused VT ablation     PAST MEDICAL HISTORY:  Cataract     Diabetes     Glaucoma     HLD (hyperlipidemia)     HTN (hypertension)     Hypothyroid     Mitral valve disease s/p MVR    Nonischemic cardiomyopathy EF <20%; DDDR ICD '99; gen change '03, BiV upgrade '08, gen change '2014    Paroxysmal atrial fibrillation     Sarcoid involving lungs, heart, ears and eyes    Systolic heart failure     VT (ventricular tachycardia) [facility] refused VT ablation

## 2021-09-30 NOTE — DISCHARGE NOTE NURSING/CASE MANAGEMENT/SOCIAL WORK - NSDCVIVACCINE_GEN_ALL_CORE_FT
influenza, injectable, quadrivalent, preservative free; 11-Oct-2017 11:25; Marielle Cody (RN); [employer]; [id number]; IntraMuscular; Deltoid Right.; 0.5 milliLiter(s); VIS (VIS Published: 07-Aug-2015, VIS Presented: 11-Oct-2017);

## 2021-09-30 NOTE — DISCHARGE NOTE PROVIDER - NSDCCPCAREPLAN_GEN_ALL_CORE_FT
PRINCIPAL DISCHARGE DIAGNOSIS  Diagnosis: ICD (implantable cardioverter-defibrillator) battery depletion  Assessment and Plan of Treatment:

## 2021-09-30 NOTE — DISCHARGE NOTE PROVIDER - NSDCFUADDAPPT_GEN_ALL_CORE_FT
Follow up with Dr. Barfield in one weeks time. The office will call you with an appointment.  Follow up with Dr. Barfield in one weeks time. The office will call you with an appointment.   Resume your Aspirin in 3 days  Resume your Warfarin on the evening of 10/1/21

## 2021-09-30 NOTE — DISCHARGE NOTE PROVIDER - HOSPITAL COURSE
70 yo F with PMH of DM, HLD, paroxysmal atrial fibrillation, TIA, systemic sarcoidosis (involving heart, lung, eyes, nose, and ears), hypothyroidism, mitral regurgitation s/p MVR ([facility] 10/5/05), nonischemic dilated cardiomyopathy s/p DDDR ICD implantation in '99 (generator change 1/8/03, s/p upgrade to biventricular ICD with pulse generator change 9/28/08, s/p biventricular ICD pulse generator change 1/29/14 and  VT storm 10/6/17 ([facility] refused VT ablation). She is now s/p successful and uncomplicated ICD generator replacement

## 2021-09-30 NOTE — H&P PST ADULT - ASSESSMENT
72 yo F with PMH of DM, HLD, paroxysmal Atrial fibrillation, TIA, systemic sarcoidosis (involving heart, lung, eyes, nose, and ears), hypothyroidism, mitral regurgitation s/p MVR ([facility] 10/5/05), nonischemic dilated cardiomyopathy s/p DDD ICD implantation in '99 (generator change 1/8/03, s/p upgrade to biventricular ICD with pulse generator change 9/28/08, s/p biventricular ICD pulse generator change 1/29/14 and  VT storm 10/6/17 ([facility] refused VT ablation). On 9/4/2021 she developed worsening dyspnea. ICD has reached SONIA and presents electively today for BiV-ICD generator change.     Plan:   Consent w/ Attending  Stat labs & ecg  Covid negative 9/28/2021  NPO***  Coumadin held ***  Metformin held *** 72 yo F with PMH of DM, HLD, paroxysmal atrial fibrillation, TIA, systemic sarcoidosis (involving heart, lung, eyes, nose, and ears), hypothyroidism, mitral regurgitation s/p MVR ([facility] 10/5/05), nonischemic dilated cardiomyopathy s/p DDD ICD implantation in '99 (generator change 1/8/03, s/p upgrade to biventricular ICD with pulse generator change 9/28/08, s/p biventricular ICD pulse generator change 1/29/14 and  VT storm 10/6/17 ([facility] refused VT ablation). On 9/4/2021 she developed worsening dyspnea and RV lead failure. ICD has also reached SONIA. She  presents electively today for RV lead revision and generator change.     Plan:   Consent w/ Attending  Stat labs & ecg  Covid negative 9/28/2021  NPO***  Coumadin held ***  Metformin held *** 70 yo F with PMH of DM, HLD, paroxysmal atrial fibrillation, TIA, systemic sarcoidosis (involving heart, lung, eyes, nose, and ears), hypothyroidism, mitral regurgitation s/p MVR ( [facility] 10/5/05), nonischemic dilated cardiomyopathy s/p DDD ICD implantation in '99 (generator change 1/8/03, s/p upgrade to biventricular ICD with pulse generator change 9/28/08, s/p biventricular ICD pulse generator change 1/29/14 and  VT storm 10/6/17 ([facility] refused VT ablation). On 9/4/2021 she developed worsening dyspnea requiring hospitalization. Patient's RV lead noted with elevated impedence. ICD has also reached SONIA. She  presents electively today for generator change and possible RV lead revision.     Plan:   Consent w/ Attending  Stat labs & ecg  Covid negative 9/28/2021  NPO > 10 hours confirmed   Coumadin and aspirin held 2 days ago   Metformin held 1 day  70 yo F with PMH of DM, HLD, paroxysmal atrial fibrillation, TIA, systemic sarcoidosis (involving heart, lung, eyes, nose, and ears), hypothyroidism, mitral regurgitation s/p MVR ( [facility] 10/5/05), nonischemic dilated cardiomyopathy s/p DDDR ICD implantation in '99 (generator change 1/8/03, s/p upgrade to biventricular ICD with pulse generator change 9/28/08, s/p biventricular ICD pulse generator change 1/29/14 and  VT storm 10/6/17 ([facility] refused VT ablation). On 9/4/2021 she developed worsening dyspnea requiring hospitalization. Patient also with noted chronically elevated RV lead impedence (since 2014). ICD has also reached SONIA. She  presents electively today for generator change and possible RV lead revision.     Plan:   Consent w/ Attending  Stat labs & ecg  Covid negative 9/28/2021  NPO > 10 hours confirmed   Coumadin and aspirin held 2 days ago   Metformin held 1 day  72 yo F with PMH of DM, HLD, paroxysmal atrial fibrillation, TIA, systemic sarcoidosis (involving heart, lung, eyes, nose, and ears), hypothyroidism, mitral regurgitation s/p MVR ( [facility] 10/5/05), nonischemic dilated cardiomyopathy s/p DDDR ICD implantation in '99 (generator change 1/8/03, s/p upgrade to biventricular ICD with pulse generator change 9/28/08, s/p biventricular ICD pulse generator change 1/29/14 and  VT storm 10/6/17 ([facility] refused VT ablation). On 9/4/2021 she developed worsening dyspnea requiring hospitalization. Patient also with noted chronically elevated RV lead impedence (since 2014). ICD has also reached SONIA. She  presents electively today for generator change and possible RV lead revision.     Plan:   Consent w/ Attending  Stat labs & ecg  Covid negative 9/28/2021  NPO > 10 hours confirmed   Coumadin and aspirin held 2 days ago   Metformin held 1 day       Addendum: 9/30/21 9:45am: [facility] Incepta CRT-D interrogation was reviewed with Dr Barfield and [facility] Representative Tyler Humphreys.   RV ICD lead #[id number] doi 1999 has elevated lead impedance >2500 ohms (shock impedance 50 ohms),  threshold 2.8V@0.5msec, no sensing as pt is dependent at VVI 30bpm, no noise on EGM.  Call placed to [facility] technical services for recommendations on RV lead, due to concern in this pacemaker dependent patient with elevated RV lead impedance & hx of appropriate ATP which was implanted ~20 years ago,   RV lead noted to have chronically elevated RV lead impedance >2000 ohms and threshold of >2.0V/.5ms at last generator change in 2014.   No formal recommendation on revising lead was given by [facility], as lead is not on advisory or recall. They recommended thorough evaluation of patients clinical scenerio, plus intraoperative assessment of lead through PSA .

## 2021-09-30 NOTE — DISCHARGE NOTE NURSING/CASE MANAGEMENT/SOCIAL WORK - PATIENT PORTAL LINK FT
You can access the FollowMyHealth Patient Portal offered by [facility] by registering at the following website: [email or web address]. By joining [employer]’s FollowMyHealth portal, you will also be able to view your health information using other applications (apps) compatible with our system.

## 2021-09-30 NOTE — DISCHARGE NOTE PROVIDER - NSDCCPTREATMENT_GEN_ALL_CORE_FT
PRINCIPAL PROCEDURE  Procedure: Replacement, biventricular ICD pulse generator  Findings and Treatment: Cardiac Device Implant Post Operative Instructions  - Do not touch the incision until it is completely healed.   - There are Steristrips (white strips of tape) on your incision, which will start to peel off on their own over the next 2-3 weeks. Do not pick at or peel off the Steristrips.   - Bruising around the implant site or over the chest, side or arm near the incision is normal, and will take a few weeks to resolve.   - Do not apply soaps, creams, lotions, ointments or powders to the incision until it is completely healed.  - You may take a shower in 24 hours, and allow the water to run over the incision. However, do not submerge the incision in water: do not swim or soak in bath tubs, hot tubs, swimming pools, etc.   You should call the doctor if:   - You notice redness, drainage, swelling, increased tenderness, hot sensation around the incision, bleeding or incision edges pulling apart.  - Your temperature is greater than 100 degrees F for more than 24 hours.  - You notice swelling or bulging at the incision or around the device that was not there when you left the hospital or is increasing in size.  - You experience increased difficulty breathing.  - You notice new/worsening swelling in your legs and ankles.  - You faint or have dizzy spells.  - You have any questions or concerns regarding your device or the procedure.

## 2021-09-30 NOTE — H&P PST ADULT - HISTORY OF PRESENT ILLNESS
70 yo F with PMH of DM, HLD, paroxysmal Atrial fibrillation, TIA, systemic sarcoidosis (involving heart, lung, eyes, nose, and ears), hypothyroidism, mitral regurgitation s/p MVR ([employer] 10/5/05), nonischemic dilated cardiomyopathy s/p DDD ICD implantation in '99 (generator change 1/8/03, s/p upgrade to biventricular ICD with pulse generator change 9/28/08, s/p biventricular ICD pulse generator change 1/29/14 and  VT storm 10/6/17 ([facility] refused VT ablation). On 9/4/2021 she developed worsening dyspnea. ICD has reached SONIA and presents electively today for BiV-ICD generator change.     Cardiology summary:  TTE: 7/4/2021  Summary:   1. Technically suboptimal study.   2. Left ventricular ejection fraction, by visual estimation, is <20%.   3. Severely decreased global left ventricular systolic function.   4. Spectral Doppler shows pseudonormal pattern of left ventricular myocardial filling (Grade II diastolic dysfunction).   5. Mildly reduced RV systolic function.   6. Adequate TR velocity was not obtained to accurately assess RVSP.   7. Severely enlarged left atrium.   8. Elevated mean left atrial pressure of 38 mmHg.   9. Mild mitral valve regurgitation.  10. Mechanical mitral prosthesis is functioning normally.  11. Mitral valve mean gradient is 2.0 mmHg consistent with mild mitral stenosis.  12. Sclerotic aortic valve with normal opening.  13. Mildly dilated pulmonary artery.  14. There is no evidence of pericardial effusion.  15. Recommend echocontrast for further visualization of the endocardial borders.   70 yo F with PMH of DM, HLD, paroxysmal atrial fibrillation, TIA, systemic sarcoidosis (involving heart, lung, eyes, nose, and ears), hypothyroidism, mitral regurgitation s/p MVR ([employer] 10/5/05), nonischemic dilated cardiomyopathy s/p DDD ICD implantation in '99 (generator change 1/8/03, s/p upgrade to biventricular ICD with pulse generator change 9/28/08, s/p biventricular ICD pulse generator change 1/29/14 and  VT storm 10/6/17 ([facility] refused VT ablation). On 9/4/2021 she developed worsening dyspnea and RV lead failure. ICD has also reached SONIA. She  presents electively today for RV lead revision and generator change.     Cardiology summary:  TTE: 7/4/2021  Summary:   1. Technically suboptimal study.   2. Left ventricular ejection fraction, by visual estimation, is <20%.   3. Severely decreased global left ventricular systolic function.   4. Spectral Doppler shows pseudonormal pattern of left ventricular myocardial filling (Grade II diastolic dysfunction).   5. Mildly reduced RV systolic function.   6. Adequate TR velocity was not obtained to accurately assess RVSP.   7. Severely enlarged left atrium.   8. Elevated mean left atrial pressure of 38 mmHg.   9. Mild mitral valve regurgitation.  10. Mechanical mitral prosthesis is functioning normally.  11. Mitral valve mean gradient is 2.0 mmHg consistent with mild mitral stenosis.  12. Sclerotic aortic valve with normal opening.  13. Mildly dilated pulmonary artery.  14. There is no evidence of pericardial effusion.  15. Recommend echocontrast for further visualization of the endocardial borders.   70 yo F with PMH of DM, HLD, paroxysmal atrial fibrillation, TIA, systemic sarcoidosis (involving heart, lung, eyes, nose, and ears), hypothyroidism, mitral regurgitation s/p MVR ([employer] 10/5/05), nonischemic dilated cardiomyopathy s/p DDD ICD implantation in '99 (generator change 1/8/03, s/p upgrade to biventricular ICD with pulse generator change 9/28/08, s/p biventricular ICD pulse generator change 1/29/14 and  VT storm 10/6/17 ([facility] refused VT ablation). On 9/4/2021 she developed worsening dyspnea requiring hospitalization. Patient's RV lead noted with elevated impedence. ICD has also reached SONIA. She  presents electively today for generator change and possible RV lead revision.     Cardiology summary:  TTE: 7/4/2021  Summary:   1. Technically suboptimal study.   2. Left ventricular ejection fraction, by visual estimation, is <20%.   3. Severely decreased global left ventricular systolic function.   4. Spectral Doppler shows pseudonormal pattern of left ventricular myocardial filling (Grade II diastolic dysfunction).   5. Mildly reduced RV systolic function.   6. Adequate TR velocity was not obtained to accurately assess RVSP.   7. Severely enlarged left atrium.   8. Elevated mean left atrial pressure of 38 mmHg.   9. Mild mitral valve regurgitation.  10. Mechanical mitral prosthesis is functioning normally.  11. Mitral valve mean gradient is 2.0 mmHg consistent with mild mitral stenosis.  12. Sclerotic aortic valve with normal opening.  13. Mildly dilated pulmonary artery.  14. There is no evidence of pericardial effusion.  15. Recommend echocontrast for further visualization of the endocardial borders.   70 yo F with PMH of DM, HLD, paroxysmal atrial fibrillation, TIA, systemic sarcoidosis (involving heart, lung, eyes, nose, and ears), hypothyroidism, mitral regurgitation s/p MVR ([employer] 10/5/05), nonischemic dilated cardiomyopathy s/p DDDR ICD implantation in '99 (generator change 1/8/03, s/p upgrade to biventricular ICD with pulse generator change 9/28/08, s/p biventricular ICD pulse generator change 1/29/14 and  VT storm 10/6/17 ([facility] refused VT ablation). On 9/4/2021 she developed worsening dyspnea requiring hospitalization. Patient's RV lead noted with elevated impedence. ICD has also reached SONIA. She  presents electively today for generator change and possible RV lead revision.     Device interrogated: 9/30/2021  [facility] DDDR 60-90bpm  RA lead: Sensing 3.3mV. Impedance 302 ohms. Threshold 0.7 V @ 0.5ms  RV lead: Impedance >2500 ohms. Threshold 2.6 V @ 0.5ms  LV lead: Impedance 356 ohms. Threshold 0.7 V @ 0.5ms  Shock impedance 50 ohms.  AP 87%  99%  Tachy settings:  VF      180pbm      ATP   31J, 41J, 41J x 6   VT      130bpm     scan ATP2 off  21J, 31J, 41J x 4   VT-1    95bpm      Monitor    Cardiology summary:  TTE: 7/4/2021  Summary:   1. Technically suboptimal study.   2. Left ventricular ejection fraction, by visual estimation, is <20%.   3. Severely decreased global left ventricular systolic function.   4. Spectral Doppler shows pseudonormal pattern of left ventricular myocardial filling (Grade II diastolic dysfunction).   5. Mildly reduced RV systolic function.   6. Adequate TR velocity was not obtained to accurately assess RVSP.   7. Severely enlarged left atrium.   8. Elevated mean left atrial pressure of 38 mmHg.   9. Mild mitral valve regurgitation.  10. Mechanical mitral prosthesis is functioning normally.  11. Mitral valve mean gradient is 2.0 mmHg consistent with mild mitral stenosis.  12. Sclerotic aortic valve with normal opening.  13. Mildly dilated pulmonary artery.  14. There is no evidence of pericardial effusion.  15. Recommend echocontrast for further visualization of the endocardial borders.   72 yo F with PMH of DM, HLD, paroxysmal atrial fibrillation, TIA, systemic sarcoidosis (involving heart, lung, eyes, nose, and ears), hypothyroidism, mitral regurgitation s/p MVR ([employer] 10/5/05), nonischemic dilated cardiomyopathy s/p DDDR ICD implantation in '99 (generator change 1/8/03, s/p upgrade to biventricular ICD with pulse generator change 9/28/08, s/p biventricular ICD pulse generator change 1/29/14 and  VT storm 10/6/17 ([facility] refused VT ablation). On 9/4/2021 she developed worsening dyspnea requiring hospitalization. Patient also with noted chronically elevated RV lead impedence (since 2014). ICD has also reached SONIA. She  presents electively today for generator change and possible RV lead revision.     Device interrogated: 9/30/2021  C DDDR 60-90bpm  RA lead: Sensing 3.3mV. Impedance 302 ohms. Threshold 0.7 V @ 0.5ms  RV lead: Impedance >2500 ohms. Threshold 2.6 V @ 0.5ms  LV lead: Impedance 356 ohms. Threshold 0.7 V @ 0.5ms  Shock impedance 50 ohms.  AP 87%  99%  Tachy settings:  VF      180pbm      ATP   31J, 41J, 41J x 6   VT      130bpm     scan ATP2 off  21J, 31J, 41J x 4   VT-1    95bpm      Monitor    Cardiology summary:  TTE: 7/4/2021  Summary:   1. Technically suboptimal study.   2. Left ventricular ejection fraction, by visual estimation, is <20%.   3. Severely decreased global left ventricular systolic function.   4. Spectral Doppler shows pseudonormal pattern of left ventricular myocardial filling (Grade II diastolic dysfunction).   5. Mildly reduced RV systolic function.   6. Adequate TR velocity was not obtained to accurately assess RVSP.   7. Severely enlarged left atrium.   8. Elevated mean left atrial pressure of 38 mmHg.   9. Mild mitral valve regurgitation.  10. Mechanical mitral prosthesis is functioning normally.  11. Mitral valve mean gradient is 2.0 mmHg consistent with mild mitral stenosis.  12. Sclerotic aortic valve with normal opening.  13. Mildly dilated pulmonary artery.  14. There is no evidence of pericardial effusion.  15. Recommend echocontrast for further visualization of the endocardial borders.   72 yo F with PMH of DM, HLD, paroxysmal atrial fibrillation, TIA, systemic sarcoidosis (involving heart, lung, eyes, nose, and ears), hypothyroidism, mitral regurgitation s/p MVR ([facility] 10/5/05), nonischemic dilated cardiomyopathy s/p DDDR ICD implantation in '99 (generator change 1/8/03, s/p upgrade to biventricular ICD with pulse generator change 9/28/08, s/p biventricular ICD pulse generator change 1/29/14 and  VT storm 10/6/17 ([facility] refused VT ablation). On 9/4/2021 she developed worsening dyspnea requiring hospitalization. Patient also with noted chronically elevated RV lead impedence (since 2014). ICD has also reached SONIA. She  presents electively today for generator change and possible RV lead revision.     Device interrogated: 9/30/2021  [facility] DDDR 60-90bpm  RA lead: Sensing 3.3mV. Impedance 302 ohms. Threshold 0.7 V @ 0.5ms  RV lead: No sensing - patient dependent. Impedance >2500 ohms. Threshold 2.6 V @ 0.5ms  LV lead: No sensing- patient dependent. Impedance 356 ohms. Threshold 0.7 V @ 0.5ms  Shock impedance 50 ohms.  AP 87%  99%  Tachy settings:  VF      180pbm      ATP   31J, 41J, 41J x 6   VT      130bpm     scan ATP2 off  21J, 31J, 41J x 4   VT-1    95bpm      Monitor    Cardiology summary:  TTE: 7/4/2021  Summary:   1. Technically suboptimal study.   2. Left ventricular ejection fraction, by visual estimation, is <20%.   3. Severely decreased global left ventricular systolic function.   4. Spectral Doppler shows pseudonormal pattern of left ventricular myocardial filling (Grade II diastolic dysfunction).   5. Mildly reduced RV systolic function.   6. Adequate TR velocity was not obtained to accurately assess RVSP.   7. Severely enlarged left atrium.   8. Elevated mean left atrial pressure of 38 mmHg.   9. Mild mitral valve regurgitation.  10. Mechanical mitral prosthesis is functioning normally.  11. Mitral valve mean gradient is 2.0 mmHg consistent with mild mitral stenosis.  12. Sclerotic aortic valve with normal opening.  13. Mildly dilated pulmonary artery.  14. There is no evidence of pericardial effusion.  15. Recommend echocontrast for further visualization of the endocardial borders.

## 2021-09-30 NOTE — PROGRESS NOTE ADULT - SUBJECTIVE AND OBJECTIVE BOX
ELECTROPHYSIOLOGY BRIEF POST-OP NOTE    I have personally seen and examined the patient today in cath lab [facility] area spot 6 following CRT-D generator replacement. Slightly tired following the procedure    PRE-OP DIAGNOSIS: ICD generator at SONIA    POST-OP DIAGNOSIS: same    PROCEDURE: BSI CRT-D generator replacement    COMPLICATIONS: none    CARDIOMYOPATHY: YES    IMPLANT EF%: 10%    NYHA CLASS: III     BETA BLOCKER: yes    ACE/ARB: yes    Physician: Dr. Barfield    ESTIMATED BLOOD LOSS: <10 mL    ANESTHESIA TYPE:  [   ]General Anesthesia  [ x ]Sedation  [   ]Local/Regional    CONDITION:  [  ]Critical  [  ]Serious  [ x ]Stable  [  ]Good    SPECIMENS REMOVED (if applicable): [employer] CRT-D generator    IMPLANT (if applicable): new [employer] CRT-D generator    Vital Signs   HR: 67 (30 Sep 2021 11:30) (66 - 71)  BP: 87/43 (30 Sep 2021 11:30) (87/43 - 118/54)  RR: 16 (30 Sep 2021 11:30) (16 - 16)  SpO2: 100% (30 Sep 2021 11:30) (98% - 100%)    Physical Exam:  Constitutional: NAD, AAOx3  Cardiovascular: +S1S2 RRR  LACW: Pressure dressing CDI. No evidence of hematoma.   Pulmonary: CTA b/l, unlabored  GI: soft NTND +BS  Extremities: no pedal edema,   Neuro: non focal, VANN x4    A/P  71 year old female patient with a history of DM, HLD, paroxysmal atrial fibrillation, TIA, systemic sarcoidosis (involving heart, lung, eyes, nose, and ears), hypothyroidism, mitral regurgitation s/p MVR ([facility] 10/5/05), nonischemic dilated cardiomyopathy s/p DDDR ICD implantation in '99 (generator change 1/8/03, s/p upgrade to biventricular ICD with pulse generator change 9/28/08, s/p biventricular ICD pulse generator change 1/29/14 and  VT storm 10/6/17 ([facility] refused VT ablation) who is now s/p successful and uncomplicated [employer] CRT-D generator replacement. RV lead impedance intra-op elevated but unchanged.     - Bedrest x 1 hour  - Hold ASA for 3 days  - Resume Coumadin on 10/1/21  - Keflex 500mg PO BID x 4 days  - Wound check in one week  - Discharge planning home today     Statement Selected

## 2021-09-30 NOTE — DISCHARGE NOTE PROVIDER - NSDCMRMEDTOKEN_GEN_ALL_CORE_FT
amiodarone 200 mg oral tablet: 0.5 tab(s) orally once a day (in the evening)  aspirin 81 mg oral delayed release tablet: 1 tab(s) orally once a day  atorvastatin 20 mg oral tablet: 1 tab(s) orally once a day (in the evening)  folic acid 1 mg oral tablet: 1 tab(s) orally once a day  Lasix 20 mg oral tablet: 1 tab(s) orally once a day, As Needed  levothyroxine 75 mcg (0.075 mg) oral tablet: 1 tab(s) orally once a day  metFORMIN 500 mg oral tablet: 1 tab(s) orally once a day (in the evening)  metoprolol succinate 25 mg oral tablet, extended release: 0.5 tab(s) orally once a day (in the evening)  sacubitril-valsartan 49 mg-51 mg oral tablet: 1 tab(s) orally once a day  Verquvo 2.5 mg oral tablet: 1 tab(s) orally once a day (in the evening)  warfarin 1 mg oral tablet: 1 tab(s) orally once a day (in the evening)   amiodarone 200 mg oral tablet: 0.5 tab(s) orally once a day (in the evening)  aspirin 81 mg oral delayed release tablet: 1 tab(s) orally once a day  atorvastatin 20 mg oral tablet: 1 tab(s) orally once a day (in the evening)  folic acid 1 mg oral tablet: 1 tab(s) orally once a day  Keflex 500 mg oral capsule: 1 cap(s) orally 2 times a day x 4 days   Lasix 20 mg oral tablet: 1 tab(s) orally once a day, As Needed  levothyroxine 75 mcg (0.075 mg) oral tablet: 1 tab(s) orally once a day  metFORMIN 500 mg oral tablet: 1 tab(s) orally once a day (in the evening)  metoprolol succinate 25 mg oral tablet, extended release: 0.5 tab(s) orally once a day (in the evening)  sacubitril-valsartan 49 mg-51 mg oral tablet: 1 tab(s) orally once a day  Verquvo 2.5 mg oral tablet: 1 tab(s) orally once a day (in the evening)  warfarin 1 mg oral tablet: 1 tab(s) orally once a day (in the evening)

## 2021-09-30 NOTE — H&P PST ADULT - COMMENTS
Denies fevers, chills, CP, SOB, abdominal pain, N/V/D, hematuria, bloody or dark colored stools     + palpitations

## 2021-09-30 NOTE — DISCHARGE NOTE PROVIDER - CARE PROVIDER_API CALL
Fany Barfield)  Cardiac Electrophysiology; Cardiovascular Disease; Internal Medicine  [address] [place] [address]  Phone: [phone]  Fax: [phone]  Established Patient  Follow Up Time: 1 week

## 2021-09-30 NOTE — H&P PST ADULT - NEUROLOGICAL
Call your healthcare provider right away if you get any of the following signs or symptoms of bleeding problems:   * Pain, color, or temperature changes to any part of your body   * Headaches, dizziness or weakness   * Unusual bruising (unexplained or growing in size)   * Nosebleeds   * Bleeding gums   * Bleeding from cuts that take a long time to stop   * Menstrual bleeding or vaginal bleeding that is heavier than normal   * Pink or brown urine   * Red or black stools   * Coughing up blood   * Vomiting blood or material that looks like coffee grounds    Update Anticoagulation Clinic (Coumadin Clinic) with any of the following:   * Medication changes (new, stopped or dose changes, this includes over-the-counter medications and supplements)   * Planning on having any surgery, medical or dental procedures   * Diet changes   * Falls  (you should go to Emergency Department immediately for evaluation, then notify Anticoagulation Clinic)    Please, do not wait until your next appointment to update us on changes.        
Alert & oriented; no sensory, motor or coordination deficits, normal reflexes

## 2021-09-30 NOTE — H&P PST ADULT - GASTROINTESTINAL
0 = understands/communicates without difficulty
Soft, non-tender, no hepatosplenomegaly, normal bowel sounds

## 2021-09-30 NOTE — H&P PST ADULT - SKIN
Quality 226: Preventive Care And Screening: Tobacco Use: Screening And Cessation Intervention: Patient screened for tobacco use and is an ex/non-smoker
Detail Level: Detailed
Quality 111:Pneumonia Vaccination Status For Older Adults: Pneumococcal Vaccination Previously Received
Quality 431: Preventive Care And Screening: Unhealthy Alcohol Use - Screening: Patient screened for unhealthy alcohol use using a single question and scores less than 2 times per year
No lesions; no rash

## 2021-09-30 NOTE — DISCHARGE NOTE NURSING/CASE MANAGEMENT/SOCIAL WORK - NSDCFUADDAPPT_GEN_ALL_CORE_FT
Follow up with Dr. Barfield in one weeks time. The office will call you with an appointment.   Resume your Aspirin in 3 days  Resume your Warfarin on the evening of 10/1/21

## 2021-09-30 NOTE — H&P PST ADULT - NSICDXPASTSURGICALHX_GEN_ALL_CORE_FT
PAST SURGICAL HISTORY:  Elective surgery back surgury    H/O mitral valve replacement     ICD (implantable cardioverter-defibrillator) in place s/p DDD ICD '99, gen change '03, BiV upgrade '08, gen change 2014     PAST SURGICAL HISTORY:  Elective surgery back surgury    H/O mitral valve replacement     ICD (implantable cardioverter-defibrillator) in place s/p DDDR ICD '99, gen change '03, BiV upgrade '08, gen change 2014

## 2021-10-08 DIAGNOSIS — Z45.02 ENCOUNTER FOR ADJUSTMENT AND MANAGEMENT OF AUTOMATIC IMPLANTABLE CARDIAC DEFIBRILLATOR: ICD-10-CM

## 2022-02-18 PROBLEM — I42.8 OTHER CARDIOMYOPATHIES: Chronic | Status: ACTIVE | Noted: 2021-09-30

## 2022-02-18 PROBLEM — I47.2 VENTRICULAR TACHYCARDIA: Chronic | Status: ACTIVE | Noted: 2021-09-30

## 2022-02-18 PROBLEM — E78.5 HYPERLIPIDEMIA, UNSPECIFIED: Chronic | Status: ACTIVE | Noted: 2021-09-30

## 2022-02-18 PROBLEM — I50.20 UNSPECIFIED SYSTOLIC (CONGESTIVE) HEART FAILURE: Chronic | Status: ACTIVE | Noted: 2021-09-30

## 2022-03-02 PROBLEM — K80.20 CHOLELITHIASIS: Status: ACTIVE | Noted: 2022-02-28

## 2022-03-03 ENCOUNTER — APPOINTMENT (OUTPATIENT)
Dept: SURGICAL ONCOLOGY | Facility: CLINIC | Age: 72
End: 2022-03-03
Payer: SELF-PAY

## 2022-03-03 VITALS
HEART RATE: 89 BPM | SYSTOLIC BLOOD PRESSURE: 108 MMHG | OXYGEN SATURATION: 93 % | TEMPERATURE: 98.7 F | RESPIRATION RATE: 16 BRPM | BODY MASS INDEX: 25.8 KG/M2 | WEIGHT: 128 LBS | DIASTOLIC BLOOD PRESSURE: 70 MMHG | HEIGHT: 59 IN

## 2022-03-03 DIAGNOSIS — K80.20 CALCULUS OF GALLBLADDER W/OUT CHOLECYSTITIS W/OUT OBSTRUCTION: ICD-10-CM

## 2022-03-03 PROCEDURE — 99204 OFFICE O/P NEW MOD 45 MIN: CPT

## 2022-03-03 RX ORDER — VERICIGUAT 10 MG/1
TABLET, FILM COATED ORAL
Refills: 0 | Status: ACTIVE | COMMUNITY

## 2022-03-03 RX ORDER — ATORVASTATIN CALCIUM 80 MG/1
TABLET, FILM COATED ORAL
Refills: 0 | Status: ACTIVE | COMMUNITY

## 2022-03-03 RX ORDER — URSODIOL 300 MG/1
300 CAPSULE ORAL
Qty: 60 | Refills: 2 | Status: ACTIVE | COMMUNITY
Start: 2022-03-03 | End: 1900-01-01

## 2022-03-03 NOTE — ASSESSMENT
[FreeTextEntry1] : We discussed her symptoms may related to symptomatic cholelithiasis, but are not classic.  Given her cardiac comorbidities, and back pain, I would like better chronicity of her symptoms as it relates to eating.  I am ordering bloodwork, and a CT.  We will also trial Ursadiol.  She may also need additional work up for her back pain.

## 2022-03-03 NOTE — CONSULT LETTER
[Dear  ___] : Dear  [unfilled], [Consult Letter:] : I had the pleasure of evaluating your patient, [unfilled]. [Please see my note below.] : Please see my note below. [Consult Closing:] : Thank you very much for allowing me to participate in the care of this patient.  If you have any questions, please do not hesitate to contact me. [Sincerely,] : Sincerely, [FreeTextEntry2] : Silvio Vargas MD [FreeTextEntry3] : Alfredo Lea MD\par Surgical Oncology\par [facility]\par Office: [phone]\par Cell: [phone]\par

## 2022-03-03 NOTE — HISTORY OF PRESENT ILLNESS
[de-identified] :  72 year old female presenting for an initial consultation for cholelithiasis.\par \par One month ago she developed right upper quadrant pain which radiates to her back.  She underwent an abdominal US on 2/10/22 due to RUQ pain radiating to the back. It revealed Steatosis and cholelithiasis. Prior to developing recent abdominal pain, she was fairly active, able to perform ADLs and household chores including climbing a flight of stairs to her basement to do laundry.  Since the pain started, her activity has become more limited due to discomfort and dyspnea on exertion. She denies any fever or chills, nausea/vomiting, bowel habit changes, night sweats or weight loss. She is not currently experiencing abdominal pain.  No association with eating at this time.  \par \par Her past medical history includes CVA (diplopia 2011), Hypothyroidism, systemic sarcoidosis involving heart, eyes, nose, lungs (previously managed with steroids), paroxysmal atrial fibrillation, mitral valve prolapse and severe MR s/p mitral valve replacement (mechanical 2005), Nonischemic dilated cardiomyopathy s/p ICD placement (1999) s/p upgrade to BiV ICD 9/2008, chronic systolic CHF (diagnosed 1999) and history of sustained VT.  She is on Warfarin, furosemide, Entresto, metoprolol, amiodarone, Verquovo, levothyroxine, metoprolol, metformin and atorvastatin.\par \par Cardiologist: Dr. Shu Barfield.  Patient sees cardiology every 3 months.  States that her heart failure is controlled fairly well.

## 2022-03-09 LAB
ALBUMIN SERPL ELPH-MCNC: 4.2 G/DL
ALP BLD-CCNC: 256 U/L
ALT SERPL-CCNC: 24 U/L
AMYLASE/CREAT SERPL: 84 U/L
ANION GAP SERPL CALC-SCNC: 14 MMOL/L
AST SERPL-CCNC: 32 U/L
BASOPHILS # BLD AUTO: 0.08 K/UL
BASOPHILS NFR BLD AUTO: 1.1 %
BILIRUB SERPL-MCNC: 0.6 MG/DL
BUN SERPL-MCNC: 25 MG/DL
CALCIUM SERPL-MCNC: 9.2 MG/DL
CHLORIDE SERPL-SCNC: 102 MMOL/L
CO2 SERPL-SCNC: 21 MMOL/L
CREAT SERPL-MCNC: 0.99 MG/DL
EGFR: 60 ML/MIN/1.73M2
EOSINOPHIL # BLD AUTO: 0.26 K/UL
EOSINOPHIL NFR BLD AUTO: 3.4 %
GLUCOSE SERPL-MCNC: 100 MG/DL
HCT VFR BLD CALC: 39.2 %
HGB BLD-MCNC: 12.8 G/DL
IMM GRANULOCYTES NFR BLD AUTO: 1.2 %
INR PPP: 3.2 RATIO
LPL SERPL-CCNC: 41 U/L
LYMPHOCYTES # BLD AUTO: 1.67 K/UL
LYMPHOCYTES NFR BLD AUTO: 22 %
MAN DIFF?: NORMAL
MCHC RBC-ENTMCNC: 32.7 GM/DL
MCHC RBC-ENTMCNC: 32.9 PG
MCV RBC AUTO: 100.8 FL
MONOCYTES # BLD AUTO: 0.67 K/UL
MONOCYTES NFR BLD AUTO: 8.8 %
NEUTROPHILS # BLD AUTO: 4.81 K/UL
NEUTROPHILS NFR BLD AUTO: 63.5 %
PLATELET # BLD AUTO: 157 K/UL
POTASSIUM SERPL-SCNC: 5.5 MMOL/L
PROT SERPL-MCNC: 7.3 G/DL
PT BLD: 38.2 SEC
RBC # BLD: 3.89 M/UL
RBC # FLD: 16.4 %
SODIUM SERPL-SCNC: 136 MMOL/L
WBC # FLD AUTO: 7.58 K/UL

## 2022-03-11 ENCOUNTER — NON-APPOINTMENT (OUTPATIENT)
Age: 72
End: 2022-03-11

## 2022-03-11 ENCOUNTER — APPOINTMENT (OUTPATIENT)
Dept: CT IMAGING | Facility: CLINIC | Age: 72
End: 2022-03-11

## 2022-03-22 ENCOUNTER — NON-APPOINTMENT (OUTPATIENT)
Age: 72
End: 2022-03-22

## 2022-03-26 ENCOUNTER — APPOINTMENT (OUTPATIENT)
Dept: CT IMAGING | Facility: CLINIC | Age: 72
End: 2022-03-26

## 2022-03-29 ENCOUNTER — APPOINTMENT (OUTPATIENT)
Dept: COLORECTAL SURGERY | Facility: CLINIC | Age: 72
End: 2022-03-29

## 2022-03-30 ENCOUNTER — OUTPATIENT (OUTPATIENT)
Dept: OUTPATIENT SERVICES | Facility: HOSPITAL | Age: 72
LOS: 1 days | End: 2022-03-30

## 2022-03-30 ENCOUNTER — RESULT REVIEW (OUTPATIENT)
Age: 72
End: 2022-03-30

## 2022-03-30 ENCOUNTER — APPOINTMENT (OUTPATIENT)
Dept: NUCLEAR MEDICINE | Facility: CLINIC | Age: 72
End: 2022-03-30
Payer: MEDICARE

## 2022-03-30 DIAGNOSIS — Z41.9 ENCOUNTER FOR PROCEDURE FOR PURPOSES OTHER THAN REMEDYING HEALTH STATE, UNSPECIFIED: Chronic | ICD-10-CM

## 2022-03-30 DIAGNOSIS — Z95.810 PRESENCE OF AUTOMATIC (IMPLANTABLE) CARDIAC DEFIBRILLATOR: Chronic | ICD-10-CM

## 2022-03-30 DIAGNOSIS — K80.20 CALCULUS OF GALLBLADDER WITHOUT CHOLECYSTITIS WITHOUT OBSTRUCTION: ICD-10-CM

## 2022-03-30 DIAGNOSIS — Z95.2 PRESENCE OF PROSTHETIC HEART VALVE: Chronic | ICD-10-CM

## 2022-03-30 PROCEDURE — 78227 HEPATOBIL SYST IMAGE W/DRUG: CPT | Mod: 26

## 2022-04-01 ENCOUNTER — APPOINTMENT (OUTPATIENT)
Dept: ORTHOPEDIC SURGERY | Facility: CLINIC | Age: 72
End: 2022-04-01
Payer: MEDICARE

## 2022-04-01 VITALS
HEART RATE: 62 BPM | BODY MASS INDEX: 23.16 KG/M2 | DIASTOLIC BLOOD PRESSURE: 54 MMHG | HEIGHT: 60 IN | SYSTOLIC BLOOD PRESSURE: 101 MMHG | WEIGHT: 118 LBS

## 2022-04-01 DIAGNOSIS — I50.9 HEART FAILURE, UNSPECIFIED: ICD-10-CM

## 2022-04-01 DIAGNOSIS — M47.816 SPONDYLOSIS W/OUT MYELOPATHY OR RADICULOPATHY, LUMBAR REGION: ICD-10-CM

## 2022-04-01 DIAGNOSIS — E11.9 TYPE 2 DIABETES MELLITUS W/OUT COMPLICATIONS: ICD-10-CM

## 2022-04-01 DIAGNOSIS — I42.9 CARDIOMYOPATHY, UNSPECIFIED: ICD-10-CM

## 2022-04-01 DIAGNOSIS — D86.9 SARCOIDOSIS, UNSPECIFIED: ICD-10-CM

## 2022-04-01 PROCEDURE — 99203 OFFICE O/P NEW LOW 30 MIN: CPT

## 2022-04-01 NOTE — ADDENDUM
[FreeTextEntry1] : Documented by Alex Freitas acting as a scribe for Dr. London Childress on 04/01/2022. All medical record entries made by the Scribe were at my, Dr. London Childress, direction and personally dictated by me on 04/01/2022 . I have reviewed the chart and agree that the record accurately reflects my personal performance of the history, physical exam, assessment and plan. I have also personally directed, reviewed, and agreed with the chart.

## 2022-04-01 NOTE — DISCUSSION/SUMMARY
[de-identified] : We talked about the nature of the condition and treatment options. Anticipatory guidance regarding mechanically oriented lower back pain was given. \par Patient already has a Ct ordered by her PCP. Patient has been referred to physical therapy for decreased pain modalities, gait and balance training, stretching and strengthening modalities, soft tissue modalities, and physical modalities. Since this office functions only in a surgical capacity and this patient is not in a post operative period we will not be prescribing pain medication at this time. Adequate control of chronic pain through narcotic medications is outside the surgical scope of this practice, therefore patient was not prescribed narcotics today. Patient may follow up after her CT scan. \par \par Prior to appointment and during encounter with patient extensive medical records were reviewed including but not limited to, hospital records, out patient records, imaging results, and lab data. During this appointment the patient was examined, diagnoses were discussed and explained in a face to face manner. In addition extensive time was spent reviewing aforementioned diagnostic studies. Counseling including abnormal image results, differential diagnoses, treatment options, risk and benefits, lifestyle changes, current condition, and current medications was performed. Patient's comments, questions, and concerns were address and patient verbalized understanding. Based on this patient's presentation at our office, which is an orthopedic spine surgeon's office, this patient inherently / intrinsically has a risk, however minute, of developing  issues such as Cauda equina syndrome, bowel and bladder changes, or progression of motor or neurological deficits such as paralysis which may be permanent. \par \par Patient with multiple medical comorbidity increasing the risk of perioperative and postoperative complications as well as diminished spine outcomes as per the current medical literature.  These include but are not limited to obesity, anxiety/depression, cardiac illness, kidney disease, peripheral vascular disease, history of cancer, COPD, dysmetabolic syndrome including but not limited to diabetes, hyperlipidemia, hypertension, and an extensive cardiac hx with multiple procedures.  Patient is being referred back to primary care provider for medical optimization, as well as other appropriate specialists as needed for optimization prior to spine surgery.

## 2022-04-01 NOTE — PHYSICAL EXAM
[Wheelchair] : uses a wheelchair [Poor Appearance] : well-appearing [Acute Distress] : not in acute distress [Obese] : not obese [de-identified] : CONSTITUTIONAL: The patient is a very pleasant individual who is well-nourished and who appears stated age.\par PSYCHIATRIC: The patient is alert and oriented X 3 and in no apparent distress, and participates with orthopedic evaluation well.\par HEAD: Atraumatic and is nonsyndromic in appearance.\par EENT: No visible thyromegaly, EOMI.\par RESPIRATORY: Respiratory rate is regular, not dyspneic on examination.\par LYMPHATICS: There is no inguinal lymphadenopathy\par INTEGUMENTARY: Skin is clean, dry, and intact about the bilateral lower extremities and lumbar spine. Complaints of dry skin on the legs. \par VASCULAR: There is brisk capillary refill about the bilateral lower extremities.\par NEUROLOGIC: There are no pathologic reflexes. There is no decrease in sensation of the bilateral lower extremities on Wartenberg pinwheel examination. Deep tendon reflexes are well maintained at 2+/4 of the bilateral lower extremities and are symmetric.\par MUSCULOSKELETAL: There is no visible muscular atrophy.Negative tension sign and straight leg raise bilaterally. Normal secondary orthopaedic exam of bilateral hips, greater trochanteric area, knees and ankles. No pain with internal or external rotation of the bilateral hips. Mechanically oriented lower back pain. BL LE strength in a seated position is 4+/5. [de-identified] : AP and Lateral views of the lumbar spine taken 01/2022 demonstrates no acute compression fractures, age appropriate lumbar degenerative disc disease appreciated.

## 2022-04-01 NOTE — HISTORY OF PRESENT ILLNESS
[de-identified] : 72 year old F presents for an initial evaluation of worsening lower back pain. She has been under the guidance of her PCP who ordered a lumbar CT. She has also been evaluated by Gen surg for gallbladder issues. States she intermittently has to use a wheelchair due to pain. Patient is not an MRI candidate, as well as non surgical, secondary to cardiac procedures. \par \par Of note patient is a poor historian, she states she is here today to discuss her LE dry skin.  [Ataxia] : no ataxia [Incontinence] : no incontinence [Loss of Dexterity] : good dexterity [Urinary Ret.] : no urinary retention

## 2022-04-05 ENCOUNTER — NON-APPOINTMENT (OUTPATIENT)
Age: 72
End: 2022-04-05

## 2023-01-01 ENCOUNTER — INPATIENT (INPATIENT)
Facility: HOSPITAL | Age: 73
LOS: 4 days | DRG: 871 | End: 2023-05-01
Attending: INTERNAL MEDICINE | Admitting: STUDENT IN AN ORGANIZED HEALTH CARE EDUCATION/TRAINING PROGRAM
Payer: MEDICARE

## 2023-01-01 ENCOUNTER — EMERGENCY (EMERGENCY)
Facility: HOSPITAL | Age: 73
LOS: 1 days | End: 2023-01-01
Attending: EMERGENCY MEDICINE
Payer: MEDICARE

## 2023-01-01 VITALS
DIASTOLIC BLOOD PRESSURE: 61 MMHG | HEART RATE: 69 BPM | SYSTOLIC BLOOD PRESSURE: 91 MMHG | RESPIRATION RATE: 16 BRPM | TEMPERATURE: 97 F | OXYGEN SATURATION: 100 %

## 2023-01-01 VITALS
OXYGEN SATURATION: 100 % | SYSTOLIC BLOOD PRESSURE: 95 MMHG | DIASTOLIC BLOOD PRESSURE: 60 MMHG | HEART RATE: 67 BPM | RESPIRATION RATE: 16 BRPM | TEMPERATURE: 97 F

## 2023-01-01 VITALS
TEMPERATURE: 98 F | SYSTOLIC BLOOD PRESSURE: 115 MMHG | DIASTOLIC BLOOD PRESSURE: 52 MMHG | RESPIRATION RATE: 16 BRPM | WEIGHT: 147.93 LBS | HEART RATE: 85 BPM | OXYGEN SATURATION: 98 %

## 2023-01-01 DIAGNOSIS — E03.9 HYPOTHYROIDISM, UNSPECIFIED: ICD-10-CM

## 2023-01-01 DIAGNOSIS — Z95.2 PRESENCE OF PROSTHETIC HEART VALVE: ICD-10-CM

## 2023-01-01 DIAGNOSIS — I48.0 PAROXYSMAL ATRIAL FIBRILLATION: ICD-10-CM

## 2023-01-01 DIAGNOSIS — B34.9 VIRAL INFECTION, UNSPECIFIED: ICD-10-CM

## 2023-01-01 DIAGNOSIS — I50.21 ACUTE SYSTOLIC (CONGESTIVE) HEART FAILURE: ICD-10-CM

## 2023-01-01 DIAGNOSIS — Z95.810 PRESENCE OF AUTOMATIC (IMPLANTABLE) CARDIAC DEFIBRILLATOR: Chronic | ICD-10-CM

## 2023-01-01 DIAGNOSIS — N28.9 DISORDER OF KIDNEY AND URETER, UNSPECIFIED: ICD-10-CM

## 2023-01-01 DIAGNOSIS — Z41.9 ENCOUNTER FOR PROCEDURE FOR PURPOSES OTHER THAN REMEDYING HEALTH STATE, UNSPECIFIED: Chronic | ICD-10-CM

## 2023-01-01 DIAGNOSIS — R74.01 ELEVATION OF LEVELS OF LIVER TRANSAMINASE LEVELS: ICD-10-CM

## 2023-01-01 DIAGNOSIS — R79.89 OTHER SPECIFIED ABNORMAL FINDINGS OF BLOOD CHEMISTRY: ICD-10-CM

## 2023-01-01 DIAGNOSIS — J96.91 RESPIRATORY FAILURE, UNSPECIFIED WITH HYPOXIA: ICD-10-CM

## 2023-01-01 DIAGNOSIS — Z95.2 PRESENCE OF PROSTHETIC HEART VALVE: Chronic | ICD-10-CM

## 2023-01-01 LAB
A1C WITH ESTIMATED AVERAGE GLUCOSE RESULT: 6.1 % — HIGH (ref 4–5.6)
ALBUMIN SERPL ELPH-MCNC: 2.1 G/DL — LOW (ref 3.3–5.2)
ALBUMIN SERPL ELPH-MCNC: 2.6 G/DL — LOW (ref 3.3–5.2)
ALBUMIN SERPL ELPH-MCNC: 2.6 G/DL — LOW (ref 3.3–5.2)
ALBUMIN SERPL ELPH-MCNC: 2.7 G/DL — LOW (ref 3.3–5.2)
ALBUMIN SERPL ELPH-MCNC: 2.7 G/DL — LOW (ref 3.3–5.2)
ALBUMIN SERPL ELPH-MCNC: 2.8 G/DL — LOW (ref 3.3–5.2)
ALBUMIN SERPL ELPH-MCNC: 2.9 G/DL — LOW (ref 3.3–5.2)
ALBUMIN SERPL ELPH-MCNC: 2.9 G/DL — LOW (ref 3.3–5.2)
ALBUMIN SERPL ELPH-MCNC: 3.1 G/DL — LOW (ref 3.3–5.2)
ALBUMIN SERPL ELPH-MCNC: 3.3 G/DL — SIGNIFICANT CHANGE UP (ref 3.3–5.2)
ALBUMIN SERPL ELPH-MCNC: 3.4 G/DL — SIGNIFICANT CHANGE UP (ref 3.3–5.2)
ALBUMIN SERPL ELPH-MCNC: 3.5 G/DL — SIGNIFICANT CHANGE UP (ref 3.3–5.2)
ALP SERPL-CCNC: 111 U/L — SIGNIFICANT CHANGE UP (ref 40–120)
ALP SERPL-CCNC: 147 U/L — HIGH (ref 40–120)
ALP SERPL-CCNC: 151 U/L — HIGH (ref 40–120)
ALP SERPL-CCNC: 156 U/L — HIGH (ref 40–120)
ALP SERPL-CCNC: 171 U/L — HIGH (ref 40–120)
ALP SERPL-CCNC: 171 U/L — HIGH (ref 40–120)
ALP SERPL-CCNC: 173 U/L — HIGH (ref 40–120)
ALP SERPL-CCNC: 174 U/L — HIGH (ref 40–120)
ALP SERPL-CCNC: 176 U/L — HIGH (ref 40–120)
ALP SERPL-CCNC: 181 U/L — HIGH (ref 40–120)
ALP SERPL-CCNC: 181 U/L — HIGH (ref 40–120)
ALP SERPL-CCNC: 185 U/L — HIGH (ref 40–120)
ALP SERPL-CCNC: 187 U/L — HIGH (ref 40–120)
ALP SERPL-CCNC: 198 U/L — HIGH (ref 40–120)
ALT FLD-CCNC: 1049 U/L — HIGH
ALT FLD-CCNC: 1070 U/L — HIGH
ALT FLD-CCNC: 1074 U/L — HIGH
ALT FLD-CCNC: 1110 U/L — HIGH
ALT FLD-CCNC: 1120 U/L — HIGH
ALT FLD-CCNC: 1204 U/L — HIGH
ALT FLD-CCNC: 1256 U/L — HIGH
ALT FLD-CCNC: 1300 U/L — HIGH
ALT FLD-CCNC: 1533 U/L — HIGH
ALT FLD-CCNC: 1671 U/L — HIGH
ALT FLD-CCNC: 2012 U/L — HIGH
ALT FLD-CCNC: 34 U/L — HIGH
ALT FLD-CCNC: 865 U/L — HIGH
ALT FLD-CCNC: 931 U/L — HIGH
ANA TITR SER: NEGATIVE — SIGNIFICANT CHANGE UP
ANION GAP SERPL CALC-SCNC: 10 MMOL/L — SIGNIFICANT CHANGE UP (ref 5–17)
ANION GAP SERPL CALC-SCNC: 12 MMOL/L — SIGNIFICANT CHANGE UP (ref 5–17)
ANION GAP SERPL CALC-SCNC: 12 MMOL/L — SIGNIFICANT CHANGE UP (ref 5–17)
ANION GAP SERPL CALC-SCNC: 13 MMOL/L — SIGNIFICANT CHANGE UP (ref 5–17)
ANION GAP SERPL CALC-SCNC: 14 MMOL/L — SIGNIFICANT CHANGE UP (ref 5–17)
ANION GAP SERPL CALC-SCNC: 15 MMOL/L — SIGNIFICANT CHANGE UP (ref 5–17)
ANION GAP SERPL CALC-SCNC: 15 MMOL/L — SIGNIFICANT CHANGE UP (ref 5–17)
ANION GAP SERPL CALC-SCNC: 16 MMOL/L — SIGNIFICANT CHANGE UP (ref 5–17)
ANION GAP SERPL CALC-SCNC: 16 MMOL/L — SIGNIFICANT CHANGE UP (ref 5–17)
ANION GAP SERPL CALC-SCNC: 17 MMOL/L — SIGNIFICANT CHANGE UP (ref 5–17)
ANION GAP SERPL CALC-SCNC: 19 MMOL/L — HIGH (ref 5–17)
ANION GAP SERPL CALC-SCNC: 20 MMOL/L — HIGH (ref 5–17)
ANISOCYTOSIS BLD QL: SLIGHT — SIGNIFICANT CHANGE UP
APPEARANCE UR: CLEAR — SIGNIFICANT CHANGE UP
APTT BLD: 101.2 SEC — HIGH (ref 27.5–35.5)
APTT BLD: 105.7 SEC — HIGH (ref 27.5–35.5)
APTT BLD: 159.6 SEC — CRITICAL HIGH (ref 27.5–35.5)
APTT BLD: 42.2 SEC — HIGH (ref 27.5–35.5)
APTT BLD: 55.9 SEC — HIGH (ref 27.5–35.5)
APTT BLD: 70 SEC — HIGH (ref 27.5–35.5)
APTT BLD: 77.8 SEC — HIGH (ref 27.5–35.5)
APTT BLD: 86.3 SEC — HIGH (ref 27.5–35.5)
APTT BLD: >200 SEC — CRITICAL HIGH (ref 27.5–35.5)
AST SERPL-CCNC: 1102 U/L — HIGH
AST SERPL-CCNC: 1150 U/L — HIGH
AST SERPL-CCNC: 1239 U/L — HIGH
AST SERPL-CCNC: 1247 U/L — HIGH
AST SERPL-CCNC: 1248 U/L — HIGH
AST SERPL-CCNC: 1370 U/L — HIGH
AST SERPL-CCNC: 1371 U/L — HIGH
AST SERPL-CCNC: 1395 U/L — HIGH
AST SERPL-CCNC: 1825 U/L — HIGH
AST SERPL-CCNC: 1931 U/L — HIGH
AST SERPL-CCNC: 3720 U/L — HIGH
AST SERPL-CCNC: 41 U/L — HIGH
AST SERPL-CCNC: 692 U/L — HIGH
AST SERPL-CCNC: 772 U/L — HIGH
BASE EXCESS BLDA CALC-SCNC: -0.9 MMOL/L — SIGNIFICANT CHANGE UP (ref -2–3)
BASE EXCESS BLDA CALC-SCNC: -8.3 MMOL/L — LOW (ref -2–3)
BASE EXCESS BLDV CALC-SCNC: 0.1 MMOL/L — SIGNIFICANT CHANGE UP (ref -2–3)
BASOPHILS # BLD AUTO: 0 K/UL — SIGNIFICANT CHANGE UP (ref 0–0.2)
BASOPHILS # BLD AUTO: 0.02 K/UL — SIGNIFICANT CHANGE UP (ref 0–0.2)
BASOPHILS # BLD AUTO: 0.03 K/UL — SIGNIFICANT CHANGE UP (ref 0–0.2)
BASOPHILS # BLD AUTO: 0.08 K/UL — SIGNIFICANT CHANGE UP (ref 0–0.2)
BASOPHILS NFR BLD AUTO: 0 % — SIGNIFICANT CHANGE UP (ref 0–2)
BASOPHILS NFR BLD AUTO: 0.2 % — SIGNIFICANT CHANGE UP (ref 0–2)
BASOPHILS NFR BLD AUTO: 0.3 % — SIGNIFICANT CHANGE UP (ref 0–2)
BASOPHILS NFR BLD AUTO: 0.8 % — SIGNIFICANT CHANGE UP (ref 0–2)
BILIRUB DIRECT SERPL-MCNC: 0.3 MG/DL — SIGNIFICANT CHANGE UP (ref 0–0.3)
BILIRUB INDIRECT FLD-MCNC: 0.3 MG/DL — SIGNIFICANT CHANGE UP (ref 0.2–1)
BILIRUB SERPL-MCNC: 0.4 MG/DL — SIGNIFICANT CHANGE UP (ref 0.4–2)
BILIRUB SERPL-MCNC: 0.5 MG/DL — SIGNIFICANT CHANGE UP (ref 0.4–2)
BILIRUB SERPL-MCNC: 0.5 MG/DL — SIGNIFICANT CHANGE UP (ref 0.4–2)
BILIRUB SERPL-MCNC: 0.6 MG/DL — SIGNIFICANT CHANGE UP (ref 0.4–2)
BILIRUB SERPL-MCNC: 0.7 MG/DL — SIGNIFICANT CHANGE UP (ref 0.4–2)
BILIRUB SERPL-MCNC: 0.9 MG/DL — SIGNIFICANT CHANGE UP (ref 0.4–2)
BILIRUB SERPL-MCNC: 1.4 MG/DL — SIGNIFICANT CHANGE UP (ref 0.4–2)
BILIRUB SERPL-MCNC: 2.1 MG/DL — HIGH (ref 0.4–2)
BILIRUB SERPL-MCNC: 2.8 MG/DL — HIGH (ref 0.4–2)
BILIRUB SERPL-MCNC: 2.8 MG/DL — HIGH (ref 0.4–2)
BILIRUB SERPL-MCNC: 3.1 MG/DL — HIGH (ref 0.4–2)
BILIRUB SERPL-MCNC: 3.8 MG/DL — HIGH (ref 0.4–2)
BILIRUB SERPL-MCNC: 4.2 MG/DL — HIGH (ref 0.4–2)
BILIRUB SERPL-MCNC: 4.6 MG/DL — HIGH (ref 0.4–2)
BILIRUB UR-MCNC: NEGATIVE — SIGNIFICANT CHANGE UP
BLD GP AB SCN SERPL QL: SIGNIFICANT CHANGE UP
BLD GP AB SCN SERPL QL: SIGNIFICANT CHANGE UP
BLOOD GAS COMMENTS ARTERIAL: SIGNIFICANT CHANGE UP
BLOOD GAS COMMENTS ARTERIAL: SIGNIFICANT CHANGE UP
BUN SERPL-MCNC: 23.2 MG/DL — HIGH (ref 8–20)
BUN SERPL-MCNC: 25.2 MG/DL — HIGH (ref 8–20)
BUN SERPL-MCNC: 28.4 MG/DL — HIGH (ref 8–20)
BUN SERPL-MCNC: 30.3 MG/DL — HIGH (ref 8–20)
BUN SERPL-MCNC: 30.6 MG/DL — HIGH (ref 8–20)
BUN SERPL-MCNC: 33 MG/DL — HIGH (ref 8–20)
BUN SERPL-MCNC: 33.8 MG/DL — HIGH (ref 8–20)
BUN SERPL-MCNC: 34.1 MG/DL — HIGH (ref 8–20)
BUN SERPL-MCNC: 36.8 MG/DL — HIGH (ref 8–20)
BUN SERPL-MCNC: 39.4 MG/DL — HIGH (ref 8–20)
BUN SERPL-MCNC: 41.9 MG/DL — HIGH (ref 8–20)
BUN SERPL-MCNC: 49.9 MG/DL — HIGH (ref 8–20)
BUN SERPL-MCNC: 53.3 MG/DL — HIGH (ref 8–20)
BUN SERPL-MCNC: 53.3 MG/DL — HIGH (ref 8–20)
BURR CELLS BLD QL SMEAR: PRESENT — SIGNIFICANT CHANGE UP
CA-I SERPL-SCNC: 1.07 MMOL/L — LOW (ref 1.15–1.33)
CALCIUM SERPL-MCNC: 7.4 MG/DL — LOW (ref 8.4–10.5)
CALCIUM SERPL-MCNC: 7.8 MG/DL — LOW (ref 8.4–10.5)
CALCIUM SERPL-MCNC: 7.8 MG/DL — LOW (ref 8.4–10.5)
CALCIUM SERPL-MCNC: 7.9 MG/DL — LOW (ref 8.4–10.5)
CALCIUM SERPL-MCNC: 8 MG/DL — LOW (ref 8.4–10.5)
CALCIUM SERPL-MCNC: 8 MG/DL — LOW (ref 8.4–10.5)
CALCIUM SERPL-MCNC: 8.2 MG/DL — LOW (ref 8.4–10.5)
CALCIUM SERPL-MCNC: 8.2 MG/DL — LOW (ref 8.4–10.5)
CALCIUM SERPL-MCNC: 8.3 MG/DL — LOW (ref 8.4–10.5)
CALCIUM SERPL-MCNC: 8.8 MG/DL — SIGNIFICANT CHANGE UP (ref 8.4–10.5)
CALCIUM SERPL-MCNC: 8.8 MG/DL — SIGNIFICANT CHANGE UP (ref 8.4–10.5)
CALCIUM SERPL-MCNC: 9.1 MG/DL — SIGNIFICANT CHANGE UP (ref 8.4–10.5)
CALCIUM SERPL-MCNC: 9.1 MG/DL — SIGNIFICANT CHANGE UP (ref 8.4–10.5)
CALCIUM SERPL-MCNC: 9.3 MG/DL — SIGNIFICANT CHANGE UP (ref 8.4–10.5)
CERULOPLASMIN SERPL-MCNC: 22 MG/DL — SIGNIFICANT CHANGE UP (ref 16–45)
CHLORIDE BLDV-SCNC: 101 MMOL/L — SIGNIFICANT CHANGE UP (ref 96–108)
CHLORIDE SERPL-SCNC: 100 MMOL/L — SIGNIFICANT CHANGE UP (ref 96–108)
CHLORIDE SERPL-SCNC: 100 MMOL/L — SIGNIFICANT CHANGE UP (ref 96–108)
CHLORIDE SERPL-SCNC: 101 MMOL/L — SIGNIFICANT CHANGE UP (ref 96–108)
CHLORIDE SERPL-SCNC: 102 MMOL/L — SIGNIFICANT CHANGE UP (ref 96–108)
CHLORIDE SERPL-SCNC: 103 MMOL/L — SIGNIFICANT CHANGE UP (ref 96–108)
CHLORIDE SERPL-SCNC: 104 MMOL/L — SIGNIFICANT CHANGE UP (ref 96–108)
CHLORIDE SERPL-SCNC: 97 MMOL/L — SIGNIFICANT CHANGE UP (ref 96–108)
CHLORIDE SERPL-SCNC: 99 MMOL/L — SIGNIFICANT CHANGE UP (ref 96–108)
CO2 SERPL-SCNC: 13 MMOL/L — LOW (ref 22–29)
CO2 SERPL-SCNC: 19 MMOL/L — LOW (ref 22–29)
CO2 SERPL-SCNC: 20 MMOL/L — LOW (ref 22–29)
CO2 SERPL-SCNC: 22 MMOL/L — SIGNIFICANT CHANGE UP (ref 22–29)
CO2 SERPL-SCNC: 23 MMOL/L — SIGNIFICANT CHANGE UP (ref 22–29)
CO2 SERPL-SCNC: 24 MMOL/L — SIGNIFICANT CHANGE UP (ref 22–29)
CO2 SERPL-SCNC: 24 MMOL/L — SIGNIFICANT CHANGE UP (ref 22–29)
CO2 SERPL-SCNC: 25 MMOL/L — SIGNIFICANT CHANGE UP (ref 22–29)
COLOR SPEC: YELLOW — SIGNIFICANT CHANGE UP
CREAT SERPL-MCNC: 1.16 MG/DL — SIGNIFICANT CHANGE UP (ref 0.5–1.3)
CREAT SERPL-MCNC: 1.24 MG/DL — SIGNIFICANT CHANGE UP (ref 0.5–1.3)
CREAT SERPL-MCNC: 1.26 MG/DL — SIGNIFICANT CHANGE UP (ref 0.5–1.3)
CREAT SERPL-MCNC: 1.44 MG/DL — HIGH (ref 0.5–1.3)
CREAT SERPL-MCNC: 1.47 MG/DL — HIGH (ref 0.5–1.3)
CREAT SERPL-MCNC: 1.52 MG/DL — HIGH (ref 0.5–1.3)
CREAT SERPL-MCNC: 1.67 MG/DL — HIGH (ref 0.5–1.3)
CREAT SERPL-MCNC: 1.71 MG/DL — HIGH (ref 0.5–1.3)
CREAT SERPL-MCNC: 1.73 MG/DL — HIGH (ref 0.5–1.3)
CREAT SERPL-MCNC: 1.75 MG/DL — HIGH (ref 0.5–1.3)
CREAT SERPL-MCNC: 1.75 MG/DL — HIGH (ref 0.5–1.3)
CREAT SERPL-MCNC: 1.95 MG/DL — HIGH (ref 0.5–1.3)
CREAT SERPL-MCNC: 2.37 MG/DL — HIGH (ref 0.5–1.3)
CREAT SERPL-MCNC: 2.4 MG/DL — HIGH (ref 0.5–1.3)
CULTURE RESULTS: SIGNIFICANT CHANGE UP
D DIMER BLD IA.RAPID-MCNC: 353 NG/ML DDU — HIGH
D DIMER BLD IA.RAPID-MCNC: 486 NG/ML DDU — HIGH
D DIMER BLD IA.RAPID-MCNC: <150 NG/ML DDU — SIGNIFICANT CHANGE UP
DIFF PNL FLD: NEGATIVE — SIGNIFICANT CHANGE UP
EGFR: 21 ML/MIN/1.73M2 — LOW
EGFR: 21 ML/MIN/1.73M2 — LOW
EGFR: 27 ML/MIN/1.73M2 — LOW
EGFR: 30 ML/MIN/1.73M2 — LOW
EGFR: 30 ML/MIN/1.73M2 — LOW
EGFR: 31 ML/MIN/1.73M2 — LOW
EGFR: 31 ML/MIN/1.73M2 — LOW
EGFR: 32 ML/MIN/1.73M2 — LOW
EGFR: 36 ML/MIN/1.73M2 — LOW
EGFR: 37 ML/MIN/1.73M2 — LOW
EGFR: 38 ML/MIN/1.73M2 — LOW
EGFR: 45 ML/MIN/1.73M2 — LOW
EGFR: 46 ML/MIN/1.73M2 — LOW
EGFR: 49 ML/MIN/1.73M2 — LOW
EOSINOPHIL # BLD AUTO: 0 K/UL — SIGNIFICANT CHANGE UP (ref 0–0.5)
EOSINOPHIL # BLD AUTO: 0.02 K/UL — SIGNIFICANT CHANGE UP (ref 0–0.5)
EOSINOPHIL # BLD AUTO: 0.06 K/UL — SIGNIFICANT CHANGE UP (ref 0–0.5)
EOSINOPHIL # BLD AUTO: 0.38 K/UL — SIGNIFICANT CHANGE UP (ref 0–0.5)
EOSINOPHIL NFR BLD AUTO: 0 % — SIGNIFICANT CHANGE UP (ref 0–6)
EOSINOPHIL NFR BLD AUTO: 0.2 % — SIGNIFICANT CHANGE UP (ref 0–6)
EOSINOPHIL NFR BLD AUTO: 0.5 % — SIGNIFICANT CHANGE UP (ref 0–6)
EOSINOPHIL NFR BLD AUTO: 3.7 % — SIGNIFICANT CHANGE UP (ref 0–6)
ESTIMATED AVERAGE GLUCOSE: 128 MG/DL — HIGH (ref 68–114)
FERRITIN SERPL-MCNC: 7117 NG/ML — HIGH (ref 15–150)
FERRITIN SERPL-MCNC: 8597 NG/ML — HIGH (ref 15–150)
FIBRINOGEN PPP-MCNC: 269 MG/DL — SIGNIFICANT CHANGE UP (ref 200–450)
FIBRINOGEN PPP-MCNC: 313 MG/DL — SIGNIFICANT CHANGE UP (ref 200–450)
FOLATE SERPL-MCNC: >20 NG/ML — SIGNIFICANT CHANGE UP
GAS PNL BLDA: SIGNIFICANT CHANGE UP
GAS PNL BLDV: 132 MMOL/L — LOW (ref 136–145)
GAS PNL BLDV: SIGNIFICANT CHANGE UP
GLUCOSE BLDC GLUCOMTR-MCNC: 108 MG/DL — HIGH (ref 70–99)
GLUCOSE BLDC GLUCOMTR-MCNC: 109 MG/DL — HIGH (ref 70–99)
GLUCOSE BLDC GLUCOMTR-MCNC: 120 MG/DL — HIGH (ref 70–99)
GLUCOSE BLDC GLUCOMTR-MCNC: 121 MG/DL — HIGH (ref 70–99)
GLUCOSE BLDC GLUCOMTR-MCNC: 132 MG/DL — HIGH (ref 70–99)
GLUCOSE BLDC GLUCOMTR-MCNC: 135 MG/DL — HIGH (ref 70–99)
GLUCOSE BLDC GLUCOMTR-MCNC: 147 MG/DL — HIGH (ref 70–99)
GLUCOSE BLDC GLUCOMTR-MCNC: 151 MG/DL — HIGH (ref 70–99)
GLUCOSE BLDC GLUCOMTR-MCNC: 156 MG/DL — HIGH (ref 70–99)
GLUCOSE BLDC GLUCOMTR-MCNC: 156 MG/DL — HIGH (ref 70–99)
GLUCOSE BLDC GLUCOMTR-MCNC: 159 MG/DL — HIGH (ref 70–99)
GLUCOSE BLDC GLUCOMTR-MCNC: 169 MG/DL — HIGH (ref 70–99)
GLUCOSE BLDC GLUCOMTR-MCNC: 171 MG/DL — HIGH (ref 70–99)
GLUCOSE BLDC GLUCOMTR-MCNC: 177 MG/DL — HIGH (ref 70–99)
GLUCOSE BLDC GLUCOMTR-MCNC: 179 MG/DL — HIGH (ref 70–99)
GLUCOSE BLDC GLUCOMTR-MCNC: 185 MG/DL — HIGH (ref 70–99)
GLUCOSE BLDC GLUCOMTR-MCNC: 217 MG/DL — HIGH (ref 70–99)
GLUCOSE BLDC GLUCOMTR-MCNC: 227 MG/DL — HIGH (ref 70–99)
GLUCOSE BLDC GLUCOMTR-MCNC: 232 MG/DL — HIGH (ref 70–99)
GLUCOSE BLDC GLUCOMTR-MCNC: 242 MG/DL — HIGH (ref 70–99)
GLUCOSE BLDC GLUCOMTR-MCNC: 271 MG/DL — HIGH (ref 70–99)
GLUCOSE BLDC GLUCOMTR-MCNC: 275 MG/DL — HIGH (ref 70–99)
GLUCOSE BLDC GLUCOMTR-MCNC: 283 MG/DL — HIGH (ref 70–99)
GLUCOSE BLDC GLUCOMTR-MCNC: 293 MG/DL — HIGH (ref 70–99)
GLUCOSE BLDC GLUCOMTR-MCNC: 299 MG/DL — HIGH (ref 70–99)
GLUCOSE BLDC GLUCOMTR-MCNC: 300 MG/DL — HIGH (ref 70–99)
GLUCOSE BLDC GLUCOMTR-MCNC: 316 MG/DL — HIGH (ref 70–99)
GLUCOSE BLDC GLUCOMTR-MCNC: 351 MG/DL — HIGH (ref 70–99)
GLUCOSE BLDC GLUCOMTR-MCNC: 44 MG/DL — CRITICAL LOW (ref 70–99)
GLUCOSE BLDC GLUCOMTR-MCNC: 46 MG/DL — CRITICAL LOW (ref 70–99)
GLUCOSE BLDC GLUCOMTR-MCNC: 63 MG/DL — LOW (ref 70–99)
GLUCOSE BLDC GLUCOMTR-MCNC: 66 MG/DL — LOW (ref 70–99)
GLUCOSE BLDC GLUCOMTR-MCNC: 93 MG/DL — SIGNIFICANT CHANGE UP (ref 70–99)
GLUCOSE BLDC GLUCOMTR-MCNC: 98 MG/DL — SIGNIFICANT CHANGE UP (ref 70–99)
GLUCOSE BLDV-MCNC: 163 MG/DL — HIGH (ref 70–99)
GLUCOSE SERPL-MCNC: 108 MG/DL — HIGH (ref 70–99)
GLUCOSE SERPL-MCNC: 145 MG/DL — HIGH (ref 70–99)
GLUCOSE SERPL-MCNC: 159 MG/DL — HIGH (ref 70–99)
GLUCOSE SERPL-MCNC: 162 MG/DL — HIGH (ref 70–99)
GLUCOSE SERPL-MCNC: 166 MG/DL — HIGH (ref 70–99)
GLUCOSE SERPL-MCNC: 167 MG/DL — HIGH (ref 70–99)
GLUCOSE SERPL-MCNC: 205 MG/DL — HIGH (ref 70–99)
GLUCOSE SERPL-MCNC: 266 MG/DL — HIGH (ref 70–99)
GLUCOSE SERPL-MCNC: 272 MG/DL — HIGH (ref 70–99)
GLUCOSE SERPL-MCNC: 280 MG/DL — HIGH (ref 70–99)
GLUCOSE SERPL-MCNC: 33 MG/DL — CRITICAL LOW (ref 70–99)
GLUCOSE SERPL-MCNC: 375 MG/DL — HIGH (ref 70–99)
GLUCOSE SERPL-MCNC: 66 MG/DL — LOW (ref 70–99)
GLUCOSE SERPL-MCNC: 70 MG/DL — SIGNIFICANT CHANGE UP (ref 70–99)
GLUCOSE UR QL: NEGATIVE MG/DL — SIGNIFICANT CHANGE UP
HAV IGM SER-ACNC: SIGNIFICANT CHANGE UP
HAV IGM SER-ACNC: SIGNIFICANT CHANGE UP
HBV CORE AB SER-ACNC: SIGNIFICANT CHANGE UP
HBV CORE IGM SER-ACNC: SIGNIFICANT CHANGE UP
HBV SURFACE AB SER-ACNC: SIGNIFICANT CHANGE UP
HBV SURFACE AG SER-ACNC: SIGNIFICANT CHANGE UP
HCO3 BLDA-SCNC: 19 MMOL/L — LOW (ref 21–28)
HCO3 BLDA-SCNC: 26 MMOL/L — SIGNIFICANT CHANGE UP (ref 21–28)
HCO3 BLDV-SCNC: 25 MMOL/L — SIGNIFICANT CHANGE UP (ref 22–29)
HCT VFR BLD CALC: 19.9 % — CRITICAL LOW (ref 34.5–45)
HCT VFR BLD CALC: 23.1 % — LOW (ref 34.5–45)
HCT VFR BLD CALC: 23.1 % — LOW (ref 34.5–45)
HCT VFR BLD CALC: 24 % — LOW (ref 34.5–45)
HCT VFR BLD CALC: 24.3 % — LOW (ref 34.5–45)
HCT VFR BLD CALC: 25.1 % — LOW (ref 34.5–45)
HCT VFR BLD CALC: 25.2 % — LOW (ref 34.5–45)
HCT VFR BLD CALC: 25.5 % — LOW (ref 34.5–45)
HCT VFR BLD CALC: 25.5 % — LOW (ref 34.5–45)
HCT VFR BLD CALC: 28.4 % — LOW (ref 34.5–45)
HCT VFR BLD CALC: 29.6 % — LOW (ref 34.5–45)
HCT VFR BLD CALC: 30.4 % — LOW (ref 34.5–45)
HCT VFR BLD CALC: 30.8 % — LOW (ref 34.5–45)
HCT VFR BLD CALC: 32.7 % — LOW (ref 34.5–45)
HCT VFR BLDA CALC: 34 % — SIGNIFICANT CHANGE UP
HCV AB S/CO SERPL IA: 0.08 S/CO — SIGNIFICANT CHANGE UP (ref 0–0.99)
HCV AB S/CO SERPL IA: 0.09 S/CO — SIGNIFICANT CHANGE UP (ref 0–0.99)
HCV AB S/CO SERPL IA: 0.09 S/CO — SIGNIFICANT CHANGE UP (ref 0–0.99)
HCV AB SERPL-IMP: SIGNIFICANT CHANGE UP
HGB BLD CALC-MCNC: 11.2 G/DL — LOW (ref 11.7–16.1)
HGB BLD-MCNC: 10 G/DL — LOW (ref 11.5–15.5)
HGB BLD-MCNC: 10.6 G/DL — LOW (ref 11.5–15.5)
HGB BLD-MCNC: 6.4 G/DL — CRITICAL LOW (ref 11.5–15.5)
HGB BLD-MCNC: 7.6 G/DL — LOW (ref 11.5–15.5)
HGB BLD-MCNC: 7.9 G/DL — LOW (ref 11.5–15.5)
HGB BLD-MCNC: 8.1 G/DL — LOW (ref 11.5–15.5)
HGB BLD-MCNC: 8.3 G/DL — LOW (ref 11.5–15.5)
HGB BLD-MCNC: 8.5 G/DL — LOW (ref 11.5–15.5)
HGB BLD-MCNC: 8.6 G/DL — LOW (ref 11.5–15.5)
HGB BLD-MCNC: 8.6 G/DL — LOW (ref 11.5–15.5)
HGB BLD-MCNC: 8.8 G/DL — LOW (ref 11.5–15.5)
HGB BLD-MCNC: 9.5 G/DL — LOW (ref 11.5–15.5)
HGB BLD-MCNC: 9.7 G/DL — LOW (ref 11.5–15.5)
HGB BLD-MCNC: 9.8 G/DL — LOW (ref 11.5–15.5)
HMPV RNA SPEC QL NAA+PROBE: DETECTED
HOROWITZ INDEX BLDA+IHG-RTO: SIGNIFICANT CHANGE UP
IMM GRANULOCYTES NFR BLD AUTO: 0.6 % — SIGNIFICANT CHANGE UP (ref 0–0.9)
IMM GRANULOCYTES NFR BLD AUTO: 0.8 % — SIGNIFICANT CHANGE UP (ref 0–0.9)
IMM GRANULOCYTES NFR BLD AUTO: 1.5 % — HIGH (ref 0–0.9)
INR BLD: 2.55 RATIO — HIGH (ref 0.88–1.16)
INR BLD: 2.76 RATIO — HIGH (ref 0.88–1.16)
INR BLD: 3.36 RATIO — HIGH (ref 0.88–1.16)
INR BLD: 7.62 RATIO — CRITICAL HIGH (ref 0.88–1.16)
INR BLD: 8.4 RATIO — CRITICAL HIGH (ref 0.88–1.16)
IRON SATN MFR SERPL: 119 UG/DL — SIGNIFICANT CHANGE UP (ref 37–145)
IRON SATN MFR SERPL: 71 % — HIGH (ref 14–50)
IRON SATN MFR SERPL: 73 % — HIGH (ref 14–50)
KETONES UR-MCNC: NEGATIVE — SIGNIFICANT CHANGE UP
LACTATE BLDV-MCNC: 2.2 MMOL/L — HIGH (ref 0.5–2)
LACTATE BLDV-MCNC: 2.9 MMOL/L — HIGH (ref 0.5–2)
LACTATE SERPL-SCNC: 10.8 MMOL/L — CRITICAL HIGH (ref 0.5–2)
LACTATE SERPL-SCNC: 2.1 MMOL/L — HIGH (ref 0.5–2)
LACTATE SERPL-SCNC: 2.1 MMOL/L — HIGH (ref 0.5–2)
LACTATE SERPL-SCNC: 2.3 MMOL/L — HIGH (ref 0.5–2)
LACTATE SERPL-SCNC: 4.1 MMOL/L — CRITICAL HIGH (ref 0.5–2)
LACTATE SERPL-SCNC: 4.4 MMOL/L — CRITICAL HIGH (ref 0.5–2)
LACTATE SERPL-SCNC: 7.7 MMOL/L — CRITICAL HIGH (ref 0.5–2)
LEGIONELLA AG UR QL: NEGATIVE — SIGNIFICANT CHANGE UP
LEUKOCYTE ESTERASE UR-ACNC: NEGATIVE — SIGNIFICANT CHANGE UP
LIDOCAIN IGE QN: 29 U/L — SIGNIFICANT CHANGE UP (ref 22–51)
LKM AB SER-ACNC: <20.1 UNITS — SIGNIFICANT CHANGE UP (ref 0–20)
LYMPHOCYTES # BLD AUTO: 0.67 K/UL — LOW (ref 1–3.3)
LYMPHOCYTES # BLD AUTO: 0.78 K/UL — LOW (ref 1–3.3)
LYMPHOCYTES # BLD AUTO: 1.55 K/UL — SIGNIFICANT CHANGE UP (ref 1–3.3)
LYMPHOCYTES # BLD AUTO: 14 % — SIGNIFICANT CHANGE UP (ref 13–44)
LYMPHOCYTES # BLD AUTO: 2.15 K/UL — SIGNIFICANT CHANGE UP (ref 1–3.3)
LYMPHOCYTES # BLD AUTO: 2.6 % — LOW (ref 13–44)
LYMPHOCYTES # BLD AUTO: 21 % — SIGNIFICANT CHANGE UP (ref 13–44)
LYMPHOCYTES # BLD AUTO: 6.5 % — LOW (ref 13–44)
MACROCYTES BLD QL: SLIGHT — SIGNIFICANT CHANGE UP
MAGNESIUM SERPL-MCNC: 1.8 MG/DL — SIGNIFICANT CHANGE UP (ref 1.6–2.6)
MAGNESIUM SERPL-MCNC: 1.8 MG/DL — SIGNIFICANT CHANGE UP (ref 1.6–2.6)
MAGNESIUM SERPL-MCNC: 1.8 MG/DL — SIGNIFICANT CHANGE UP (ref 1.8–2.6)
MAGNESIUM SERPL-MCNC: 1.9 MG/DL — SIGNIFICANT CHANGE UP (ref 1.6–2.6)
MAGNESIUM SERPL-MCNC: 1.9 MG/DL — SIGNIFICANT CHANGE UP (ref 1.6–2.6)
MAGNESIUM SERPL-MCNC: 2 MG/DL — SIGNIFICANT CHANGE UP (ref 1.6–2.6)
MAGNESIUM SERPL-MCNC: 2 MG/DL — SIGNIFICANT CHANGE UP (ref 1.6–2.6)
MAGNESIUM SERPL-MCNC: 2 MG/DL — SIGNIFICANT CHANGE UP (ref 1.8–2.6)
MAGNESIUM SERPL-MCNC: 2.1 MG/DL — SIGNIFICANT CHANGE UP (ref 1.6–2.6)
MAGNESIUM SERPL-MCNC: 2.1 MG/DL — SIGNIFICANT CHANGE UP (ref 1.8–2.6)
MAGNESIUM SERPL-MCNC: 2.2 MG/DL — SIGNIFICANT CHANGE UP (ref 1.6–2.6)
MAGNESIUM SERPL-MCNC: 2.3 MG/DL — SIGNIFICANT CHANGE UP (ref 1.6–2.6)
MANUAL SMEAR VERIFICATION: SIGNIFICANT CHANGE UP
MCHC RBC-ENTMCNC: 31 PG — SIGNIFICANT CHANGE UP (ref 27–34)
MCHC RBC-ENTMCNC: 31.7 PG — SIGNIFICANT CHANGE UP (ref 27–34)
MCHC RBC-ENTMCNC: 31.7 PG — SIGNIFICANT CHANGE UP (ref 27–34)
MCHC RBC-ENTMCNC: 32 PG — SIGNIFICANT CHANGE UP (ref 27–34)
MCHC RBC-ENTMCNC: 32 PG — SIGNIFICANT CHANGE UP (ref 27–34)
MCHC RBC-ENTMCNC: 32.2 GM/DL — SIGNIFICANT CHANGE UP (ref 32–36)
MCHC RBC-ENTMCNC: 32.2 GM/DL — SIGNIFICANT CHANGE UP (ref 32–36)
MCHC RBC-ENTMCNC: 32.2 PG — SIGNIFICANT CHANGE UP (ref 27–34)
MCHC RBC-ENTMCNC: 32.3 PG — SIGNIFICANT CHANGE UP (ref 27–34)
MCHC RBC-ENTMCNC: 32.4 GM/DL — SIGNIFICANT CHANGE UP (ref 32–36)
MCHC RBC-ENTMCNC: 32.5 GM/DL — SIGNIFICANT CHANGE UP (ref 32–36)
MCHC RBC-ENTMCNC: 32.5 PG — SIGNIFICANT CHANGE UP (ref 27–34)
MCHC RBC-ENTMCNC: 32.6 PG — SIGNIFICANT CHANGE UP (ref 27–34)
MCHC RBC-ENTMCNC: 32.7 PG — SIGNIFICANT CHANGE UP (ref 27–34)
MCHC RBC-ENTMCNC: 32.8 GM/DL — SIGNIFICANT CHANGE UP (ref 32–36)
MCHC RBC-ENTMCNC: 32.8 PG — SIGNIFICANT CHANGE UP (ref 27–34)
MCHC RBC-ENTMCNC: 32.9 GM/DL — SIGNIFICANT CHANGE UP (ref 32–36)
MCHC RBC-ENTMCNC: 32.9 PG — SIGNIFICANT CHANGE UP (ref 27–34)
MCHC RBC-ENTMCNC: 33.3 GM/DL — SIGNIFICANT CHANGE UP (ref 32–36)
MCHC RBC-ENTMCNC: 33.5 GM/DL — SIGNIFICANT CHANGE UP (ref 32–36)
MCHC RBC-ENTMCNC: 33.7 GM/DL — SIGNIFICANT CHANGE UP (ref 32–36)
MCHC RBC-ENTMCNC: 33.9 GM/DL — SIGNIFICANT CHANGE UP (ref 32–36)
MCHC RBC-ENTMCNC: 34.1 GM/DL — SIGNIFICANT CHANGE UP (ref 32–36)
MCHC RBC-ENTMCNC: 34.2 GM/DL — SIGNIFICANT CHANGE UP (ref 32–36)
MCHC RBC-ENTMCNC: 34.5 GM/DL — SIGNIFICANT CHANGE UP (ref 32–36)
MCHC RBC-ENTMCNC: 34.6 GM/DL — SIGNIFICANT CHANGE UP (ref 32–36)
MCV RBC AUTO: 100 FL — SIGNIFICANT CHANGE UP (ref 80–100)
MCV RBC AUTO: 100.7 FL — HIGH (ref 80–100)
MCV RBC AUTO: 91.7 FL — SIGNIFICANT CHANGE UP (ref 80–100)
MCV RBC AUTO: 93 FL — SIGNIFICANT CHANGE UP (ref 80–100)
MCV RBC AUTO: 93.1 FL — SIGNIFICANT CHANGE UP (ref 80–100)
MCV RBC AUTO: 93.7 FL — SIGNIFICANT CHANGE UP (ref 80–100)
MCV RBC AUTO: 94.1 FL — SIGNIFICANT CHANGE UP (ref 80–100)
MCV RBC AUTO: 94.4 FL — SIGNIFICANT CHANGE UP (ref 80–100)
MCV RBC AUTO: 95.9 FL — SIGNIFICANT CHANGE UP (ref 80–100)
MCV RBC AUTO: 98.3 FL — SIGNIFICANT CHANGE UP (ref 80–100)
MCV RBC AUTO: 99.1 FL — SIGNIFICANT CHANGE UP (ref 80–100)
MCV RBC AUTO: 99.4 FL — SIGNIFICANT CHANGE UP (ref 80–100)
MCV RBC AUTO: 99.4 FL — SIGNIFICANT CHANGE UP (ref 80–100)
MCV RBC AUTO: 99.7 FL — SIGNIFICANT CHANGE UP (ref 80–100)
MITOCHONDRIA AB SER-ACNC: SIGNIFICANT CHANGE UP
MONOCYTES # BLD AUTO: 0.67 K/UL — SIGNIFICANT CHANGE UP (ref 0–0.9)
MONOCYTES # BLD AUTO: 0.75 K/UL — SIGNIFICANT CHANGE UP (ref 0–0.9)
MONOCYTES # BLD AUTO: 0.92 K/UL — HIGH (ref 0–0.9)
MONOCYTES # BLD AUTO: 1.02 K/UL — HIGH (ref 0–0.9)
MONOCYTES NFR BLD AUTO: 2.6 % — SIGNIFICANT CHANGE UP (ref 2–14)
MONOCYTES NFR BLD AUTO: 6.3 % — SIGNIFICANT CHANGE UP (ref 2–14)
MONOCYTES NFR BLD AUTO: 8.3 % — SIGNIFICANT CHANGE UP (ref 2–14)
MONOCYTES NFR BLD AUTO: 9.9 % — SIGNIFICANT CHANGE UP (ref 2–14)
MRSA PCR RESULT.: SIGNIFICANT CHANGE UP
NEUTROPHILS # BLD AUTO: 10.31 K/UL — HIGH (ref 1.8–7.4)
NEUTROPHILS # BLD AUTO: 24.6 K/UL — HIGH (ref 1.8–7.4)
NEUTROPHILS # BLD AUTO: 6.48 K/UL — SIGNIFICANT CHANGE UP (ref 1.8–7.4)
NEUTROPHILS # BLD AUTO: 8.47 K/UL — HIGH (ref 1.8–7.4)
NEUTROPHILS NFR BLD AUTO: 63.1 % — SIGNIFICANT CHANGE UP (ref 43–77)
NEUTROPHILS NFR BLD AUTO: 76.4 % — SIGNIFICANT CHANGE UP (ref 43–77)
NEUTROPHILS NFR BLD AUTO: 85.9 % — HIGH (ref 43–77)
NEUTROPHILS NFR BLD AUTO: 92.2 % — HIGH (ref 43–77)
NEUTS BAND # BLD: 2.6 % — SIGNIFICANT CHANGE UP (ref 0–8)
NITRITE UR-MCNC: NEGATIVE — SIGNIFICANT CHANGE UP
NRBC # BLD: 1 /100 — HIGH (ref 0–0)
NRBC # BLD: 10 /100 WBCS — HIGH (ref 0–0)
NRBC # BLD: 2 /100 WBCS — HIGH (ref 0–0)
NRBC # BLD: 3 /100 WBCS — HIGH (ref 0–0)
NRBC # BLD: 4 /100 WBCS — HIGH (ref 0–0)
NRBC # BLD: 5 /100 WBCS — HIGH (ref 0–0)
NRBC # BLD: 6 /100 WBCS — HIGH (ref 0–0)
NRBC # BLD: 7 /100 WBCS — HIGH (ref 0–0)
NRBC # BLD: 8 /100 WBCS — HIGH (ref 0–0)
NT-PROBNP SERPL-SCNC: 2551 PG/ML — HIGH (ref 0–300)
NT-PROBNP SERPL-SCNC: HIGH PG/ML (ref 0–300)
NT-PROBNP SERPL-SCNC: HIGH PG/ML (ref 0–300)
PCO2 BLDA: 46 MMHG — HIGH (ref 32–45)
PCO2 BLDA: 55 MMHG — HIGH (ref 32–45)
PCO2 BLDV: 39 MMHG — SIGNIFICANT CHANGE UP (ref 39–42)
PH BLDA: 7.23 — LOW (ref 7.35–7.45)
PH BLDA: 7.28 — LOW (ref 7.35–7.45)
PH BLDV: 7.41 — SIGNIFICANT CHANGE UP (ref 7.32–7.43)
PH UR: 6 — SIGNIFICANT CHANGE UP (ref 5–8)
PHOSPHATE SERPL-MCNC: 2.6 MG/DL — SIGNIFICANT CHANGE UP (ref 2.4–4.7)
PHOSPHATE SERPL-MCNC: 3.4 MG/DL — SIGNIFICANT CHANGE UP (ref 2.4–4.7)
PHOSPHATE SERPL-MCNC: 3.8 MG/DL — SIGNIFICANT CHANGE UP (ref 2.4–4.7)
PHOSPHATE SERPL-MCNC: 4.3 MG/DL — SIGNIFICANT CHANGE UP (ref 2.4–4.7)
PHOSPHATE SERPL-MCNC: 4.5 MG/DL — SIGNIFICANT CHANGE UP (ref 2.4–4.7)
PHOSPHATE SERPL-MCNC: 4.8 MG/DL — HIGH (ref 2.4–4.7)
PHOSPHATE SERPL-MCNC: 5.2 MG/DL — HIGH (ref 2.4–4.7)
PHOSPHATE SERPL-MCNC: 5.4 MG/DL — HIGH (ref 2.4–4.7)
PHOSPHATE SERPL-MCNC: 5.6 MG/DL — HIGH (ref 2.4–4.7)
PHOSPHATE SERPL-MCNC: 6 MG/DL — HIGH (ref 2.4–4.7)
PHOSPHATE SERPL-MCNC: 7 MG/DL — HIGH (ref 2.4–4.7)
PLAT MORPH BLD: NORMAL — SIGNIFICANT CHANGE UP
PLATELET # BLD AUTO: 102 K/UL — LOW (ref 150–400)
PLATELET # BLD AUTO: 102 K/UL — LOW (ref 150–400)
PLATELET # BLD AUTO: 103 K/UL — LOW (ref 150–400)
PLATELET # BLD AUTO: 105 K/UL — LOW (ref 150–400)
PLATELET # BLD AUTO: 107 K/UL — LOW (ref 150–400)
PLATELET # BLD AUTO: 112 K/UL — LOW (ref 150–400)
PLATELET # BLD AUTO: 112 K/UL — LOW (ref 150–400)
PLATELET # BLD AUTO: 130 K/UL — LOW (ref 150–400)
PLATELET # BLD AUTO: 131 K/UL — LOW (ref 150–400)
PLATELET # BLD AUTO: 137 K/UL — LOW (ref 150–400)
PLATELET # BLD AUTO: 146 K/UL — LOW (ref 150–400)
PLATELET # BLD AUTO: 146 K/UL — LOW (ref 150–400)
PLATELET # BLD AUTO: 166 K/UL — SIGNIFICANT CHANGE UP (ref 150–400)
PLATELET # BLD AUTO: 82 K/UL — LOW (ref 150–400)
PO2 BLDA: 101 MMHG — SIGNIFICANT CHANGE UP (ref 83–108)
PO2 BLDA: 80 MMHG — LOW (ref 83–108)
PO2 BLDV: 167 MMHG — HIGH (ref 25–45)
POIKILOCYTOSIS BLD QL AUTO: SLIGHT — SIGNIFICANT CHANGE UP
POLYCHROMASIA BLD QL SMEAR: SLIGHT — SIGNIFICANT CHANGE UP
POTASSIUM BLDV-SCNC: 3.9 MMOL/L — SIGNIFICANT CHANGE UP (ref 3.5–5.1)
POTASSIUM SERPL-MCNC: 3.4 MMOL/L — LOW (ref 3.5–5.3)
POTASSIUM SERPL-MCNC: 3.5 MMOL/L — SIGNIFICANT CHANGE UP (ref 3.5–5.3)
POTASSIUM SERPL-MCNC: 3.6 MMOL/L — SIGNIFICANT CHANGE UP (ref 3.5–5.3)
POTASSIUM SERPL-MCNC: 3.9 MMOL/L — SIGNIFICANT CHANGE UP (ref 3.5–5.3)
POTASSIUM SERPL-MCNC: 4 MMOL/L — SIGNIFICANT CHANGE UP (ref 3.5–5.3)
POTASSIUM SERPL-MCNC: 4.1 MMOL/L — SIGNIFICANT CHANGE UP (ref 3.5–5.3)
POTASSIUM SERPL-MCNC: 4.1 MMOL/L — SIGNIFICANT CHANGE UP (ref 3.5–5.3)
POTASSIUM SERPL-MCNC: 4.3 MMOL/L — SIGNIFICANT CHANGE UP (ref 3.5–5.3)
POTASSIUM SERPL-MCNC: 4.4 MMOL/L — SIGNIFICANT CHANGE UP (ref 3.5–5.3)
POTASSIUM SERPL-MCNC: 4.5 MMOL/L — SIGNIFICANT CHANGE UP (ref 3.5–5.3)
POTASSIUM SERPL-MCNC: 4.5 MMOL/L — SIGNIFICANT CHANGE UP (ref 3.5–5.3)
POTASSIUM SERPL-MCNC: 4.7 MMOL/L — SIGNIFICANT CHANGE UP (ref 3.5–5.3)
POTASSIUM SERPL-MCNC: 5.2 MMOL/L — SIGNIFICANT CHANGE UP (ref 3.5–5.3)
POTASSIUM SERPL-MCNC: 5.5 MMOL/L — HIGH (ref 3.5–5.3)
POTASSIUM SERPL-SCNC: 3.4 MMOL/L — LOW (ref 3.5–5.3)
POTASSIUM SERPL-SCNC: 3.5 MMOL/L — SIGNIFICANT CHANGE UP (ref 3.5–5.3)
POTASSIUM SERPL-SCNC: 3.6 MMOL/L — SIGNIFICANT CHANGE UP (ref 3.5–5.3)
POTASSIUM SERPL-SCNC: 3.9 MMOL/L — SIGNIFICANT CHANGE UP (ref 3.5–5.3)
POTASSIUM SERPL-SCNC: 4 MMOL/L — SIGNIFICANT CHANGE UP (ref 3.5–5.3)
POTASSIUM SERPL-SCNC: 4.1 MMOL/L — SIGNIFICANT CHANGE UP (ref 3.5–5.3)
POTASSIUM SERPL-SCNC: 4.1 MMOL/L — SIGNIFICANT CHANGE UP (ref 3.5–5.3)
POTASSIUM SERPL-SCNC: 4.3 MMOL/L — SIGNIFICANT CHANGE UP (ref 3.5–5.3)
POTASSIUM SERPL-SCNC: 4.4 MMOL/L — SIGNIFICANT CHANGE UP (ref 3.5–5.3)
POTASSIUM SERPL-SCNC: 4.5 MMOL/L — SIGNIFICANT CHANGE UP (ref 3.5–5.3)
POTASSIUM SERPL-SCNC: 4.5 MMOL/L — SIGNIFICANT CHANGE UP (ref 3.5–5.3)
POTASSIUM SERPL-SCNC: 4.7 MMOL/L — SIGNIFICANT CHANGE UP (ref 3.5–5.3)
POTASSIUM SERPL-SCNC: 5.2 MMOL/L — SIGNIFICANT CHANGE UP (ref 3.5–5.3)
POTASSIUM SERPL-SCNC: 5.5 MMOL/L — HIGH (ref 3.5–5.3)
PROCALCITONIN SERPL-MCNC: 0.38 NG/ML — HIGH (ref 0.02–0.1)
PROCALCITONIN SERPL-MCNC: 0.39 NG/ML — HIGH (ref 0.02–0.1)
PROT SERPL-MCNC: 4.4 G/DL — LOW (ref 6.6–8.7)
PROT SERPL-MCNC: 4.8 G/DL — LOW (ref 6.6–8.7)
PROT SERPL-MCNC: 4.9 G/DL — LOW (ref 6.6–8.7)
PROT SERPL-MCNC: 5 G/DL — LOW (ref 6.6–8.7)
PROT SERPL-MCNC: 5.1 G/DL — LOW (ref 6.6–8.7)
PROT SERPL-MCNC: 5.2 G/DL — LOW (ref 6.6–8.7)
PROT SERPL-MCNC: 5.3 G/DL — LOW (ref 6.6–8.7)
PROT SERPL-MCNC: 5.4 G/DL — LOW (ref 6.6–8.7)
PROT SERPL-MCNC: 5.9 G/DL — LOW (ref 6.6–8.7)
PROT SERPL-MCNC: 6.2 G/DL — LOW (ref 6.6–8.7)
PROT SERPL-MCNC: 6.4 G/DL — LOW (ref 6.6–8.7)
PROT SERPL-MCNC: 6.4 G/DL — LOW (ref 6.6–8.7)
PROT SERPL-MCNC: 6.5 G/DL — LOW (ref 6.6–8.7)
PROT SERPL-MCNC: 7.2 G/DL — SIGNIFICANT CHANGE UP (ref 6.6–8.7)
PROT UR-MCNC: NEGATIVE — SIGNIFICANT CHANGE UP
PROTHROM AB SERPL-ACNC: 29.9 SEC — HIGH (ref 10.5–13.4)
PROTHROM AB SERPL-ACNC: 32.3 SEC — HIGH (ref 10.5–13.4)
PROTHROM AB SERPL-ACNC: 39.5 SEC — HIGH (ref 10.5–13.4)
PROTHROM AB SERPL-ACNC: 90.2 SEC — HIGH (ref 10.5–13.4)
PROTHROM AB SERPL-ACNC: 99.6 SEC — HIGH (ref 10.5–13.4)
RAPID RVP RESULT: DETECTED
RBC # BLD: 1.99 M/UL — LOW (ref 3.8–5.2)
RBC # BLD: 2.33 M/UL — LOW (ref 3.8–5.2)
RBC # BLD: 2.41 M/UL — LOW (ref 3.8–5.2)
RBC # BLD: 2.58 M/UL — LOW (ref 3.8–5.2)
RBC # BLD: 2.61 M/UL — LOW (ref 3.8–5.2)
RBC # BLD: 2.66 M/UL — LOW (ref 3.8–5.2)
RBC # BLD: 2.69 M/UL — LOW (ref 3.8–5.2)
RBC # BLD: 2.71 M/UL — LOW (ref 3.8–5.2)
RBC # BLD: 2.78 M/UL — LOW (ref 3.8–5.2)
RBC # BLD: 2.89 M/UL — LOW (ref 3.8–5.2)
RBC # BLD: 2.97 M/UL — LOW (ref 3.8–5.2)
RBC # BLD: 3.02 M/UL — LOW (ref 3.8–5.2)
RBC # BLD: 3.1 M/UL — LOW (ref 3.8–5.2)
RBC # BLD: 3.29 M/UL — LOW (ref 3.8–5.2)
RBC # FLD: 13.2 % — SIGNIFICANT CHANGE UP (ref 10.3–14.5)
RBC # FLD: 15.2 % — HIGH (ref 10.3–14.5)
RBC # FLD: 15.2 % — HIGH (ref 10.3–14.5)
RBC # FLD: 15.3 % — HIGH (ref 10.3–14.5)
RBC # FLD: 15.3 % — HIGH (ref 10.3–14.5)
RBC # FLD: 15.4 % — HIGH (ref 10.3–14.5)
RBC # FLD: 15.6 % — HIGH (ref 10.3–14.5)
RBC # FLD: 15.9 % — HIGH (ref 10.3–14.5)
RBC # FLD: 17.1 % — HIGH (ref 10.3–14.5)
RBC # FLD: 17.2 % — HIGH (ref 10.3–14.5)
RBC # FLD: 17.4 % — HIGH (ref 10.3–14.5)
RBC BLD AUTO: ABNORMAL
S AUREUS DNA NOSE QL NAA+PROBE: DETECTED
SAO2 % BLDA: 97.3 % — SIGNIFICANT CHANGE UP (ref 94–98)
SAO2 % BLDA: 98.7 % — HIGH (ref 94–98)
SAO2 % BLDV: 99 % — SIGNIFICANT CHANGE UP
SARS-COV-2 RNA SPEC QL NAA+PROBE: SIGNIFICANT CHANGE UP
SODIUM SERPL-SCNC: 134 MMOL/L — LOW (ref 135–145)
SODIUM SERPL-SCNC: 135 MMOL/L — SIGNIFICANT CHANGE UP (ref 135–145)
SODIUM SERPL-SCNC: 136 MMOL/L — SIGNIFICANT CHANGE UP (ref 135–145)
SODIUM SERPL-SCNC: 137 MMOL/L — SIGNIFICANT CHANGE UP (ref 135–145)
SODIUM SERPL-SCNC: 139 MMOL/L — SIGNIFICANT CHANGE UP (ref 135–145)
SODIUM SERPL-SCNC: 139 MMOL/L — SIGNIFICANT CHANGE UP (ref 135–145)
SP GR SPEC: 1.01 — SIGNIFICANT CHANGE UP (ref 1.01–1.02)
SPECIMEN SOURCE: SIGNIFICANT CHANGE UP
TIBC SERPL-MCNC: 163 UG/DL — LOW (ref 220–430)
TIBC SERPL-MCNC: 169 UG/DL — LOW (ref 220–430)
TRANSFERRIN SERPL-MCNC: 114 MG/DL — LOW (ref 192–382)
TRANSFERRIN SERPL-MCNC: 118 MG/DL — LOW (ref 192–382)
TROPONIN T SERPL-MCNC: 0.26 NG/ML — HIGH (ref 0–0.06)
TROPONIN T SERPL-MCNC: <0.01 NG/ML — SIGNIFICANT CHANGE UP (ref 0–0.06)
TSH SERPL-MCNC: 0.32 UIU/ML — SIGNIFICANT CHANGE UP (ref 0.27–4.2)
UFH PPP CHRO-ACNC: 1.67 IU/ML — CRITICAL HIGH (ref 0.3–0.7)
UROBILINOGEN FLD QL: NEGATIVE MG/DL — SIGNIFICANT CHANGE UP
VANCOMYCIN FLD-MCNC: 17.7 UG/ML — SIGNIFICANT CHANGE UP
VANCOMYCIN FLD-MCNC: 47 UG/ML
VANCOMYCIN TROUGH SERPL-MCNC: 10.2 UG/ML — SIGNIFICANT CHANGE UP (ref 10–20)
VANCOMYCIN TROUGH SERPL-MCNC: 18.6 UG/ML — SIGNIFICANT CHANGE UP (ref 10–20)
VANCOMYCIN TROUGH SERPL-MCNC: >80 UG/ML — CRITICAL HIGH (ref 10–20)
VIT B12 SERPL-MCNC: >2000 PG/ML — HIGH (ref 232–1245)
WBC # BLD: 10.26 K/UL — SIGNIFICANT CHANGE UP (ref 3.8–10.5)
WBC # BLD: 11.09 K/UL — HIGH (ref 3.8–10.5)
WBC # BLD: 11.98 K/UL — HIGH (ref 3.8–10.5)
WBC # BLD: 15.06 K/UL — HIGH (ref 3.8–10.5)
WBC # BLD: 16.66 K/UL — HIGH (ref 3.8–10.5)
WBC # BLD: 23.15 K/UL — HIGH (ref 3.8–10.5)
WBC # BLD: 25.95 K/UL — HIGH (ref 3.8–10.5)
WBC # BLD: 27.32 K/UL — HIGH (ref 3.8–10.5)
WBC # BLD: 29.75 K/UL — HIGH (ref 3.8–10.5)
WBC # BLD: 32.16 K/UL — HIGH (ref 3.8–10.5)
WBC # BLD: 32.28 K/UL — HIGH (ref 3.8–10.5)
WBC # BLD: 33.87 K/UL — HIGH (ref 3.8–10.5)
WBC # BLD: 36.09 K/UL — HIGH (ref 3.8–10.5)
WBC # BLD: 37.61 K/UL — HIGH (ref 3.8–10.5)
WBC # FLD AUTO: 10.26 K/UL — SIGNIFICANT CHANGE UP (ref 3.8–10.5)
WBC # FLD AUTO: 11.09 K/UL — HIGH (ref 3.8–10.5)
WBC # FLD AUTO: 11.98 K/UL — HIGH (ref 3.8–10.5)
WBC # FLD AUTO: 15.06 K/UL — HIGH (ref 3.8–10.5)
WBC # FLD AUTO: 16.66 K/UL — HIGH (ref 3.8–10.5)
WBC # FLD AUTO: 23.15 K/UL — HIGH (ref 3.8–10.5)
WBC # FLD AUTO: 25.95 K/UL — HIGH (ref 3.8–10.5)
WBC # FLD AUTO: 27.32 K/UL — HIGH (ref 3.8–10.5)
WBC # FLD AUTO: 29.75 K/UL — HIGH (ref 3.8–10.5)
WBC # FLD AUTO: 32.16 K/UL — HIGH (ref 3.8–10.5)
WBC # FLD AUTO: 32.28 K/UL — HIGH (ref 3.8–10.5)
WBC # FLD AUTO: 33.87 K/UL — HIGH (ref 3.8–10.5)
WBC # FLD AUTO: 36.09 K/UL — HIGH (ref 3.8–10.5)
WBC # FLD AUTO: 37.61 K/UL — HIGH (ref 3.8–10.5)

## 2023-01-01 PROCEDURE — 83540 ASSAY OF IRON: CPT

## 2023-01-01 PROCEDURE — 84132 ASSAY OF SERUM POTASSIUM: CPT

## 2023-01-01 PROCEDURE — 85730 THROMBOPLASTIN TIME PARTIAL: CPT

## 2023-01-01 PROCEDURE — 85610 PROTHROMBIN TIME: CPT

## 2023-01-01 PROCEDURE — 71045 X-RAY EXAM CHEST 1 VIEW: CPT | Mod: 26

## 2023-01-01 PROCEDURE — 86705 HEP B CORE ANTIBODY IGM: CPT

## 2023-01-01 PROCEDURE — 99222 1ST HOSP IP/OBS MODERATE 55: CPT

## 2023-01-01 PROCEDURE — 86850 RBC ANTIBODY SCREEN: CPT

## 2023-01-01 PROCEDURE — 83735 ASSAY OF MAGNESIUM: CPT

## 2023-01-01 PROCEDURE — 99285 EMERGENCY DEPT VISIT HI MDM: CPT | Mod: 25

## 2023-01-01 PROCEDURE — 87086 URINE CULTURE/COLONY COUNT: CPT

## 2023-01-01 PROCEDURE — 82746 ASSAY OF FOLIC ACID SERUM: CPT

## 2023-01-01 PROCEDURE — 99233 SBSQ HOSP IP/OBS HIGH 50: CPT

## 2023-01-01 PROCEDURE — 84484 ASSAY OF TROPONIN QUANT: CPT

## 2023-01-01 PROCEDURE — 80053 COMPREHEN METABOLIC PANEL: CPT

## 2023-01-01 PROCEDURE — 86900 BLOOD TYPING SEROLOGIC ABO: CPT

## 2023-01-01 PROCEDURE — 99291 CRITICAL CARE FIRST HOUR: CPT

## 2023-01-01 PROCEDURE — 82607 VITAMIN B-12: CPT

## 2023-01-01 PROCEDURE — 87449 NOS EACH ORGANISM AG IA: CPT

## 2023-01-01 PROCEDURE — 85025 COMPLETE CBC W/AUTO DIFF WBC: CPT

## 2023-01-01 PROCEDURE — 94760 N-INVAS EAR/PLS OXIMETRY 1: CPT

## 2023-01-01 PROCEDURE — 86038 ANTINUCLEAR ANTIBODIES: CPT

## 2023-01-01 PROCEDURE — 93010 ELECTROCARDIOGRAM REPORT: CPT

## 2023-01-01 PROCEDURE — 71045 X-RAY EXAM CHEST 1 VIEW: CPT | Mod: 26,76

## 2023-01-01 PROCEDURE — 86902 BLOOD TYPE ANTIGEN DONOR EA: CPT

## 2023-01-01 PROCEDURE — 94660 CPAP INITIATION&MGMT: CPT

## 2023-01-01 PROCEDURE — 83036 HEMOGLOBIN GLYCOSYLATED A1C: CPT

## 2023-01-01 PROCEDURE — 99223 1ST HOSP IP/OBS HIGH 75: CPT

## 2023-01-01 PROCEDURE — 83690 ASSAY OF LIPASE: CPT

## 2023-01-01 PROCEDURE — 94799 UNLISTED PULMONARY SVC/PX: CPT

## 2023-01-01 PROCEDURE — 86706 HEP B SURFACE ANTIBODY: CPT

## 2023-01-01 PROCEDURE — 82390 ASSAY OF CERULOPLASMIN: CPT

## 2023-01-01 PROCEDURE — 0225U NFCT DS DNA&RNA 21 SARSCOV2: CPT

## 2023-01-01 PROCEDURE — 76705 ECHO EXAM OF ABDOMEN: CPT

## 2023-01-01 PROCEDURE — 82803 BLOOD GASES ANY COMBINATION: CPT

## 2023-01-01 PROCEDURE — 76705 ECHO EXAM OF ABDOMEN: CPT | Mod: 26

## 2023-01-01 PROCEDURE — 36430 TRANSFUSION BLD/BLD COMPNT: CPT

## 2023-01-01 PROCEDURE — 86381 MITOCHONDRIAL ANTIBODY EACH: CPT

## 2023-01-01 PROCEDURE — 87340 HEPATITIS B SURFACE AG IA: CPT

## 2023-01-01 PROCEDURE — 93306 TTE W/DOPPLER COMPLETE: CPT | Mod: 26

## 2023-01-01 PROCEDURE — 71045 X-RAY EXAM CHEST 1 VIEW: CPT | Mod: 26,77

## 2023-01-01 PROCEDURE — 82728 ASSAY OF FERRITIN: CPT

## 2023-01-01 PROCEDURE — 82435 ASSAY OF BLOOD CHLORIDE: CPT

## 2023-01-01 PROCEDURE — 86704 HEP B CORE ANTIBODY TOTAL: CPT

## 2023-01-01 PROCEDURE — C8929: CPT

## 2023-01-01 PROCEDURE — 83550 IRON BINDING TEST: CPT

## 2023-01-01 PROCEDURE — 94640 AIRWAY INHALATION TREATMENT: CPT

## 2023-01-01 PROCEDURE — 87641 MR-STAPH DNA AMP PROBE: CPT

## 2023-01-01 PROCEDURE — 86901 BLOOD TYPING SEROLOGIC RH(D): CPT

## 2023-01-01 PROCEDURE — 94002 VENT MGMT INPAT INIT DAY: CPT

## 2023-01-01 PROCEDURE — 80048 BASIC METABOLIC PNL TOTAL CA: CPT

## 2023-01-01 PROCEDURE — 84295 ASSAY OF SERUM SODIUM: CPT

## 2023-01-01 PROCEDURE — 93005 ELECTROCARDIOGRAM TRACING: CPT

## 2023-01-01 PROCEDURE — 36415 COLL VENOUS BLD VENIPUNCTURE: CPT

## 2023-01-01 PROCEDURE — 87040 BLOOD CULTURE FOR BACTERIA: CPT

## 2023-01-01 PROCEDURE — 82947 ASSAY GLUCOSE BLOOD QUANT: CPT

## 2023-01-01 PROCEDURE — 84466 ASSAY OF TRANSFERRIN: CPT

## 2023-01-01 PROCEDURE — 85027 COMPLETE CBC AUTOMATED: CPT

## 2023-01-01 PROCEDURE — 86376 MICROSOMAL ANTIBODY EACH: CPT

## 2023-01-01 PROCEDURE — 80074 ACUTE HEPATITIS PANEL: CPT

## 2023-01-01 PROCEDURE — 80202 ASSAY OF VANCOMYCIN: CPT

## 2023-01-01 PROCEDURE — 99284 EMERGENCY DEPT VISIT MOD MDM: CPT

## 2023-01-01 PROCEDURE — 86803 HEPATITIS C AB TEST: CPT

## 2023-01-01 PROCEDURE — 85018 HEMOGLOBIN: CPT

## 2023-01-01 PROCEDURE — 83880 ASSAY OF NATRIURETIC PEPTIDE: CPT

## 2023-01-01 PROCEDURE — 99283 EMERGENCY DEPT VISIT LOW MDM: CPT | Mod: 25

## 2023-01-01 PROCEDURE — 86922 COMPATIBILITY TEST ANTIGLOB: CPT

## 2023-01-01 PROCEDURE — 82962 GLUCOSE BLOOD TEST: CPT

## 2023-01-01 PROCEDURE — 85384 FIBRINOGEN ACTIVITY: CPT

## 2023-01-01 PROCEDURE — P9016: CPT

## 2023-01-01 PROCEDURE — 84443 ASSAY THYROID STIM HORMONE: CPT

## 2023-01-01 PROCEDURE — 99497 ADVNCD CARE PLAN 30 MIN: CPT

## 2023-01-01 PROCEDURE — 96360 HYDRATION IV INFUSION INIT: CPT

## 2023-01-01 PROCEDURE — 82330 ASSAY OF CALCIUM: CPT

## 2023-01-01 PROCEDURE — 84145 PROCALCITONIN (PCT): CPT

## 2023-01-01 PROCEDURE — 83605 ASSAY OF LACTIC ACID: CPT

## 2023-01-01 PROCEDURE — 85014 HEMATOCRIT: CPT

## 2023-01-01 PROCEDURE — 85379 FIBRIN DEGRADATION QUANT: CPT

## 2023-01-01 PROCEDURE — 71045 X-RAY EXAM CHEST 1 VIEW: CPT

## 2023-01-01 PROCEDURE — 80076 HEPATIC FUNCTION PANEL: CPT

## 2023-01-01 PROCEDURE — 94003 VENT MGMT INPAT SUBQ DAY: CPT

## 2023-01-01 PROCEDURE — 87640 STAPH A DNA AMP PROBE: CPT

## 2023-01-01 PROCEDURE — 99285 EMERGENCY DEPT VISIT HI MDM: CPT

## 2023-01-01 PROCEDURE — 81003 URINALYSIS AUTO W/O SCOPE: CPT

## 2023-01-01 PROCEDURE — 84100 ASSAY OF PHOSPHORUS: CPT

## 2023-01-01 PROCEDURE — 96374 THER/PROPH/DIAG INJ IV PUSH: CPT

## 2023-01-01 PROCEDURE — 85520 HEPARIN ASSAY: CPT

## 2023-01-01 RX ORDER — FOLIC ACID 0.8 MG
1 TABLET ORAL
Refills: 0 | DISCHARGE

## 2023-01-01 RX ORDER — VANCOMYCIN HCL 1 G
1250 VIAL (EA) INTRAVENOUS
Refills: 0 | Status: DISCONTINUED | OUTPATIENT
Start: 2023-01-01 | End: 2023-01-01

## 2023-01-01 RX ORDER — FUROSEMIDE 40 MG
1 TABLET ORAL
Qty: 0 | Refills: 0 | DISCHARGE

## 2023-01-01 RX ORDER — BUDESONIDE, MICRONIZED 100 %
0.5 POWDER (GRAM) MISCELLANEOUS EVERY 12 HOURS
Refills: 0 | Status: DISCONTINUED | OUTPATIENT
Start: 2023-01-01 | End: 2023-01-01

## 2023-01-01 RX ORDER — WARFARIN SODIUM 2.5 MG/1
1 TABLET ORAL ONCE
Refills: 0 | Status: DISCONTINUED | OUTPATIENT
Start: 2023-01-01 | End: 2023-01-01

## 2023-01-01 RX ORDER — SODIUM CHLORIDE 9 MG/ML
1000 INJECTION, SOLUTION INTRAVENOUS
Refills: 0 | Status: DISCONTINUED | OUTPATIENT
Start: 2023-01-01 | End: 2023-01-01

## 2023-01-01 RX ORDER — INSULIN LISPRO 100/ML
VIAL (ML) SUBCUTANEOUS EVERY 6 HOURS
Refills: 0 | Status: DISCONTINUED | OUTPATIENT
Start: 2023-01-01 | End: 2023-01-01

## 2023-01-01 RX ORDER — SODIUM CHLORIDE 9 MG/ML
500 INJECTION, SOLUTION INTRAVENOUS ONCE
Refills: 0 | Status: COMPLETED | OUTPATIENT
Start: 2023-01-01 | End: 2023-01-01

## 2023-01-01 RX ORDER — PROPOFOL 10 MG/ML
10 INJECTION, EMULSION INTRAVENOUS
Qty: 500 | Refills: 0 | Status: DISCONTINUED | OUTPATIENT
Start: 2023-01-01 | End: 2023-01-01

## 2023-01-01 RX ORDER — ALBUTEROL 90 UG/1
2.5 AEROSOL, METERED ORAL EVERY 4 HOURS
Refills: 0 | Status: DISCONTINUED | OUTPATIENT
Start: 2023-01-01 | End: 2023-01-01

## 2023-01-01 RX ORDER — DEXTROSE 50 % IN WATER 50 %
25 SYRINGE (ML) INTRAVENOUS ONCE
Refills: 0 | Status: COMPLETED | OUTPATIENT
Start: 2023-01-01 | End: 2023-01-01

## 2023-01-01 RX ORDER — PANTOPRAZOLE SODIUM 20 MG/1
40 TABLET, DELAYED RELEASE ORAL
Refills: 0 | Status: DISCONTINUED | OUTPATIENT
Start: 2023-01-01 | End: 2023-01-01

## 2023-01-01 RX ORDER — DEXTROSE 50 % IN WATER 50 %
12.5 SYRINGE (ML) INTRAVENOUS ONCE
Refills: 0 | Status: DISCONTINUED | OUTPATIENT
Start: 2023-01-01 | End: 2023-01-01

## 2023-01-01 RX ORDER — VANCOMYCIN HCL 1 G
1000 VIAL (EA) INTRAVENOUS ONCE
Refills: 0 | Status: COMPLETED | OUTPATIENT
Start: 2023-01-01 | End: 2023-01-01

## 2023-01-01 RX ORDER — FUROSEMIDE 40 MG
40 TABLET ORAL ONCE
Refills: 0 | Status: COMPLETED | OUTPATIENT
Start: 2023-01-01 | End: 2023-01-01

## 2023-01-01 RX ORDER — MIDAZOLAM HYDROCHLORIDE 1 MG/ML
4 INJECTION, SOLUTION INTRAMUSCULAR; INTRAVENOUS ONCE
Refills: 0 | Status: DISCONTINUED | OUTPATIENT
Start: 2023-01-01 | End: 2023-01-01

## 2023-01-01 RX ORDER — VANCOMYCIN HCL 1 G
1000 VIAL (EA) INTRAVENOUS EVERY 24 HOURS
Refills: 0 | Status: DISCONTINUED | OUTPATIENT
Start: 2023-01-01 | End: 2023-01-01

## 2023-01-01 RX ORDER — NOREPINEPHRINE BITARTRATE/D5W 8 MG/250ML
0.05 PLASTIC BAG, INJECTION (ML) INTRAVENOUS
Qty: 8 | Refills: 0 | Status: DISCONTINUED | OUTPATIENT
Start: 2023-01-01 | End: 2023-01-01

## 2023-01-01 RX ORDER — VASOPRESSIN 20 [USP'U]/ML
0.04 INJECTION INTRAVENOUS
Qty: 40 | Refills: 0 | Status: DISCONTINUED | OUTPATIENT
Start: 2023-01-01 | End: 2023-01-01

## 2023-01-01 RX ORDER — ALBUTEROL 90 UG/1
2.5 AEROSOL, METERED ORAL ONCE
Refills: 0 | Status: COMPLETED | OUTPATIENT
Start: 2023-01-01 | End: 2023-01-01

## 2023-01-01 RX ORDER — DORZOLAMIDE HYDROCHLORIDE 20 MG/ML
1 SOLUTION/ DROPS OPHTHALMIC THREE TIMES A DAY
Refills: 0 | Status: DISCONTINUED | OUTPATIENT
Start: 2023-01-01 | End: 2023-01-01

## 2023-01-01 RX ORDER — SODIUM BICARBONATE 1 MEQ/ML
50 SYRINGE (ML) INTRAVENOUS ONCE
Refills: 0 | Status: COMPLETED | OUTPATIENT
Start: 2023-01-01 | End: 2023-01-01

## 2023-01-01 RX ORDER — WARFARIN SODIUM 2.5 MG/1
1 TABLET ORAL
Qty: 0 | Refills: 0 | DISCHARGE

## 2023-01-01 RX ORDER — SODIUM CHLORIDE 9 MG/ML
1000 INJECTION INTRAMUSCULAR; INTRAVENOUS; SUBCUTANEOUS ONCE
Refills: 0 | Status: DISCONTINUED | OUTPATIENT
Start: 2023-01-01 | End: 2023-01-01

## 2023-01-01 RX ORDER — HEPARIN SODIUM 5000 [USP'U]/ML
2500 INJECTION INTRAVENOUS; SUBCUTANEOUS EVERY 6 HOURS
Refills: 0 | Status: DISCONTINUED | OUTPATIENT
Start: 2023-01-01 | End: 2023-01-01

## 2023-01-01 RX ORDER — IPRATROPIUM/ALBUTEROL SULFATE 18-103MCG
3 AEROSOL WITH ADAPTER (GRAM) INHALATION ONCE
Refills: 0 | Status: COMPLETED | OUTPATIENT
Start: 2023-01-01 | End: 2023-01-01

## 2023-01-01 RX ORDER — NOREPINEPHRINE BITARTRATE/D5W 8 MG/250ML
0.05 PLASTIC BAG, INJECTION (ML) INTRAVENOUS
Qty: 16 | Refills: 0 | Status: DISCONTINUED | OUTPATIENT
Start: 2023-01-01 | End: 2023-01-01

## 2023-01-01 RX ORDER — AZITHROMYCIN 500 MG/1
TABLET, FILM COATED ORAL
Refills: 0 | Status: DISCONTINUED | OUTPATIENT
Start: 2023-01-01 | End: 2023-01-01

## 2023-01-01 RX ORDER — CHLORHEXIDINE GLUCONATE 213 G/1000ML
15 SOLUTION TOPICAL EVERY 12 HOURS
Refills: 0 | Status: DISCONTINUED | OUTPATIENT
Start: 2023-01-01 | End: 2023-01-01

## 2023-01-01 RX ORDER — IPRATROPIUM/ALBUTEROL SULFATE 18-103MCG
3 AEROSOL WITH ADAPTER (GRAM) INHALATION EVERY 4 HOURS
Refills: 0 | Status: DISCONTINUED | OUTPATIENT
Start: 2023-01-01 | End: 2023-01-01

## 2023-01-01 RX ORDER — OXYMETAZOLINE HYDROCHLORIDE 0.5 MG/ML
2 SPRAY NASAL ONCE
Refills: 0 | Status: COMPLETED | OUTPATIENT
Start: 2023-01-01 | End: 2023-01-01

## 2023-01-01 RX ORDER — SODIUM CHLORIDE 9 MG/ML
500 INJECTION INTRAMUSCULAR; INTRAVENOUS; SUBCUTANEOUS ONCE
Refills: 0 | Status: COMPLETED | OUTPATIENT
Start: 2023-01-01 | End: 2023-01-01

## 2023-01-01 RX ORDER — ALBUTEROL 90 UG/1
2.5 AEROSOL, METERED ORAL
Refills: 0 | Status: DISCONTINUED | OUTPATIENT
Start: 2023-01-01 | End: 2023-01-01

## 2023-01-01 RX ORDER — HEPARIN SODIUM 5000 [USP'U]/ML
5500 INJECTION INTRAVENOUS; SUBCUTANEOUS EVERY 6 HOURS
Refills: 0 | Status: DISCONTINUED | OUTPATIENT
Start: 2023-01-01 | End: 2023-01-01

## 2023-01-01 RX ORDER — HEPARIN SODIUM 5000 [USP'U]/ML
INJECTION INTRAVENOUS; SUBCUTANEOUS
Qty: 25000 | Refills: 0 | Status: DISCONTINUED | OUTPATIENT
Start: 2023-01-01 | End: 2023-01-01

## 2023-01-01 RX ORDER — DEXTROSE 50 % IN WATER 50 %
12.5 SYRINGE (ML) INTRAVENOUS ONCE
Refills: 0 | Status: COMPLETED | OUTPATIENT
Start: 2023-01-01 | End: 2023-01-01

## 2023-01-01 RX ORDER — CEFEPIME 1 G/1
2000 INJECTION, POWDER, FOR SOLUTION INTRAMUSCULAR; INTRAVENOUS ONCE
Refills: 0 | Status: COMPLETED | OUTPATIENT
Start: 2023-01-01 | End: 2023-01-01

## 2023-01-01 RX ORDER — DORZOLAMIDE HYDROCHLORIDE 20 MG/ML
1 SOLUTION/ DROPS OPHTHALMIC
Refills: 0 | DISCHARGE

## 2023-01-01 RX ORDER — POTASSIUM CHLORIDE 20 MEQ
20 PACKET (EA) ORAL ONCE
Refills: 0 | Status: COMPLETED | OUTPATIENT
Start: 2023-01-01 | End: 2023-01-01

## 2023-01-01 RX ORDER — HEPARIN SODIUM 5000 [USP'U]/ML
4000 INJECTION INTRAVENOUS; SUBCUTANEOUS EVERY 6 HOURS
Refills: 0 | Status: DISCONTINUED | OUTPATIENT
Start: 2023-01-01 | End: 2023-01-01

## 2023-01-01 RX ORDER — DEXTROSE 50 % IN WATER 50 %
15 SYRINGE (ML) INTRAVENOUS ONCE
Refills: 0 | Status: DISCONTINUED | OUTPATIENT
Start: 2023-01-01 | End: 2023-01-01

## 2023-01-01 RX ORDER — METFORMIN HYDROCHLORIDE 850 MG/1
1 TABLET ORAL
Qty: 0 | Refills: 0 | DISCHARGE

## 2023-01-01 RX ORDER — LEVOTHYROXINE SODIUM 125 MCG
1 TABLET ORAL
Qty: 0 | Refills: 0 | DISCHARGE

## 2023-01-01 RX ORDER — NOREPINEPHRINE BITARTRATE/D5W 8 MG/250ML
0.05 PLASTIC BAG, INJECTION (ML) INTRAVENOUS
Qty: 32 | Refills: 0 | Status: DISCONTINUED | OUTPATIENT
Start: 2023-01-01 | End: 2023-01-01

## 2023-01-01 RX ORDER — HEPARIN SODIUM 5000 [USP'U]/ML
2000 INJECTION INTRAVENOUS; SUBCUTANEOUS EVERY 6 HOURS
Refills: 0 | Status: DISCONTINUED | OUTPATIENT
Start: 2023-01-01 | End: 2023-01-01

## 2023-01-01 RX ORDER — CEFEPIME 1 G/1
1000 INJECTION, POWDER, FOR SOLUTION INTRAMUSCULAR; INTRAVENOUS EVERY 12 HOURS
Refills: 0 | Status: DISCONTINUED | OUTPATIENT
Start: 2023-01-01 | End: 2023-01-01

## 2023-01-01 RX ORDER — LEVOTHYROXINE SODIUM 125 MCG
36 TABLET ORAL AT BEDTIME
Refills: 0 | Status: DISCONTINUED | OUTPATIENT
Start: 2023-01-01 | End: 2023-01-01

## 2023-01-01 RX ORDER — DOBUTAMINE HCL 250MG/20ML
2.5 VIAL (ML) INTRAVENOUS
Qty: 500 | Refills: 0 | Status: DISCONTINUED | OUTPATIENT
Start: 2023-01-01 | End: 2023-01-01

## 2023-01-01 RX ORDER — CHLORHEXIDINE GLUCONATE 213 G/1000ML
1 SOLUTION TOPICAL
Refills: 0 | Status: DISCONTINUED | OUTPATIENT
Start: 2023-01-01 | End: 2023-01-01

## 2023-01-01 RX ORDER — BACLOFEN 100 %
1 POWDER (GRAM) MISCELLANEOUS
Refills: 0 | DISCHARGE

## 2023-01-01 RX ORDER — INSULIN HUMAN 100 [IU]/ML
8 INJECTION, SOLUTION SUBCUTANEOUS
Qty: 100 | Refills: 0 | Status: DISCONTINUED | OUTPATIENT
Start: 2023-01-01 | End: 2023-01-01

## 2023-01-01 RX ORDER — SODIUM BICARBONATE 1 MEQ/ML
0.22 SYRINGE (ML) INTRAVENOUS
Qty: 150 | Refills: 0 | Status: DISCONTINUED | OUTPATIENT
Start: 2023-01-01 | End: 2023-01-01

## 2023-01-01 RX ORDER — HEPARIN SODIUM 5000 [USP'U]/ML
400 INJECTION INTRAVENOUS; SUBCUTANEOUS
Qty: 25000 | Refills: 0 | Status: DISCONTINUED | OUTPATIENT
Start: 2023-01-01 | End: 2023-01-01

## 2023-01-01 RX ORDER — CISATRACURIUM BESYLATE 2 MG/ML
3 INJECTION INTRAVENOUS
Qty: 200 | Refills: 0 | Status: DISCONTINUED | OUTPATIENT
Start: 2023-01-01 | End: 2023-01-01

## 2023-01-01 RX ORDER — HYDROMORPHONE HYDROCHLORIDE 2 MG/ML
0.5 INJECTION INTRAMUSCULAR; INTRAVENOUS; SUBCUTANEOUS ONCE
Refills: 0 | Status: DISCONTINUED | OUTPATIENT
Start: 2023-01-01 | End: 2023-01-01

## 2023-01-01 RX ORDER — ROCURONIUM BROMIDE 10 MG/ML
100 VIAL (ML) INTRAVENOUS ONCE
Refills: 0 | Status: COMPLETED | OUTPATIENT
Start: 2023-01-01 | End: 2023-01-01

## 2023-01-01 RX ORDER — ATORVASTATIN CALCIUM 80 MG/1
1 TABLET, FILM COATED ORAL
Qty: 0 | Refills: 0 | DISCHARGE

## 2023-01-01 RX ORDER — METOPROLOL TARTRATE 50 MG
0.5 TABLET ORAL
Qty: 0 | Refills: 0 | DISCHARGE

## 2023-01-01 RX ORDER — CEFEPIME 1 G/1
INJECTION, POWDER, FOR SOLUTION INTRAMUSCULAR; INTRAVENOUS
Refills: 0 | Status: DISCONTINUED | OUTPATIENT
Start: 2023-01-01 | End: 2023-01-01

## 2023-01-01 RX ORDER — AZITHROMYCIN 500 MG/1
500 TABLET, FILM COATED ORAL ONCE
Refills: 0 | Status: COMPLETED | OUTPATIENT
Start: 2023-01-01 | End: 2023-01-01

## 2023-01-01 RX ORDER — DEXMEDETOMIDINE HYDROCHLORIDE IN 0.9% SODIUM CHLORIDE 4 UG/ML
0.3 INJECTION INTRAVENOUS
Qty: 200 | Refills: 0 | Status: DISCONTINUED | OUTPATIENT
Start: 2023-01-01 | End: 2023-01-01

## 2023-01-01 RX ORDER — MIDODRINE HYDROCHLORIDE 2.5 MG/1
10 TABLET ORAL EVERY 8 HOURS
Refills: 0 | Status: DISCONTINUED | OUTPATIENT
Start: 2023-01-01 | End: 2023-01-01

## 2023-01-01 RX ORDER — DEXTROSE 50 % IN WATER 50 %
25 SYRINGE (ML) INTRAVENOUS ONCE
Refills: 0 | Status: DISCONTINUED | OUTPATIENT
Start: 2023-01-01 | End: 2023-01-01

## 2023-01-01 RX ORDER — PANTOPRAZOLE SODIUM 20 MG/1
40 TABLET, DELAYED RELEASE ORAL DAILY
Refills: 0 | Status: DISCONTINUED | OUTPATIENT
Start: 2023-01-01 | End: 2023-01-01

## 2023-01-01 RX ORDER — LATANOPROST 0.05 MG/ML
1 SOLUTION/ DROPS OPHTHALMIC; TOPICAL AT BEDTIME
Refills: 0 | Status: DISCONTINUED | OUTPATIENT
Start: 2023-01-01 | End: 2023-01-01

## 2023-01-01 RX ORDER — VANCOMYCIN HCL 1 G
VIAL (EA) INTRAVENOUS
Refills: 0 | Status: DISCONTINUED | OUTPATIENT
Start: 2023-01-01 | End: 2023-01-01

## 2023-01-01 RX ORDER — IPRATROPIUM/ALBUTEROL SULFATE 18-103MCG
3 AEROSOL WITH ADAPTER (GRAM) INHALATION EVERY 6 HOURS
Refills: 0 | Status: DISCONTINUED | OUTPATIENT
Start: 2023-01-01 | End: 2023-01-01

## 2023-01-01 RX ORDER — AZITHROMYCIN 500 MG/1
500 TABLET, FILM COATED ORAL EVERY 24 HOURS
Refills: 0 | Status: DISCONTINUED | OUTPATIENT
Start: 2023-01-01 | End: 2023-01-01

## 2023-01-01 RX ORDER — GLUCAGON INJECTION, SOLUTION 0.5 MG/.1ML
1 INJECTION, SOLUTION SUBCUTANEOUS ONCE
Refills: 0 | Status: DISCONTINUED | OUTPATIENT
Start: 2023-01-01 | End: 2023-01-01

## 2023-01-01 RX ORDER — DEXTROSE 50 % IN WATER 50 %
100 SYRINGE (ML) INTRAVENOUS ONCE
Refills: 0 | Status: COMPLETED | OUTPATIENT
Start: 2023-01-01 | End: 2023-01-01

## 2023-01-01 RX ORDER — PROPOFOL 10 MG/ML
5 INJECTION, EMULSION INTRAVENOUS
Qty: 1000 | Refills: 0 | Status: DISCONTINUED | OUTPATIENT
Start: 2023-01-01 | End: 2023-01-01

## 2023-01-01 RX ORDER — LATANOPROST 0.05 MG/ML
1 SOLUTION/ DROPS OPHTHALMIC; TOPICAL
Refills: 0 | DISCHARGE

## 2023-01-01 RX ORDER — VERICIGUAT 2.5 MG/1
1 TABLET, FILM COATED ORAL
Qty: 0 | Refills: 0 | DISCHARGE

## 2023-01-01 RX ORDER — FENTANYL CITRATE 50 UG/ML
2 INJECTION INTRAVENOUS
Qty: 2500 | Refills: 0 | Status: DISCONTINUED | OUTPATIENT
Start: 2023-01-01 | End: 2023-01-01

## 2023-01-01 RX ORDER — CEFEPIME 1 G/1
2000 INJECTION, POWDER, FOR SOLUTION INTRAMUSCULAR; INTRAVENOUS EVERY 12 HOURS
Refills: 0 | Status: DISCONTINUED | OUTPATIENT
Start: 2023-01-01 | End: 2023-01-01

## 2023-01-01 RX ORDER — FUROSEMIDE 40 MG
20 TABLET ORAL ONCE
Refills: 0 | Status: COMPLETED | OUTPATIENT
Start: 2023-01-01 | End: 2023-01-01

## 2023-01-01 RX ORDER — NOREPINEPHRINE BITARTRATE/D5W 8 MG/250ML
1.4 PLASTIC BAG, INJECTION (ML) INTRAVENOUS
Qty: 16 | Refills: 0 | Status: DISCONTINUED | OUTPATIENT
Start: 2023-01-01 | End: 2023-01-01

## 2023-01-01 RX ORDER — INSULIN GLARGINE 100 [IU]/ML
10 INJECTION, SOLUTION SUBCUTANEOUS AT BEDTIME
Refills: 0 | Status: DISCONTINUED | OUTPATIENT
Start: 2023-01-01 | End: 2023-01-01

## 2023-01-01 RX ORDER — ETOMIDATE 2 MG/ML
20 INJECTION INTRAVENOUS ONCE
Refills: 0 | Status: COMPLETED | OUTPATIENT
Start: 2023-01-01 | End: 2023-01-01

## 2023-01-01 RX ORDER — VANCOMYCIN HCL 1 G
1000 VIAL (EA) INTRAVENOUS
Refills: 0 | Status: DISCONTINUED | OUTPATIENT
Start: 2023-01-01 | End: 2023-01-01

## 2023-01-01 RX ADMIN — Medication 2.36 MICROGRAM(S)/KG/MIN: at 12:21

## 2023-01-01 RX ADMIN — MIDODRINE HYDROCHLORIDE 10 MILLIGRAM(S): 2.5 TABLET ORAL at 13:17

## 2023-01-01 RX ADMIN — Medication 3 MILLILITER(S): at 19:25

## 2023-01-01 RX ADMIN — CEFEPIME 1000 MILLIGRAM(S): 1 INJECTION, POWDER, FOR SOLUTION INTRAMUSCULAR; INTRAVENOUS at 05:23

## 2023-01-01 RX ADMIN — PROPOFOL 1.51 MICROGRAM(S)/KG/MIN: 10 INJECTION, EMULSION INTRAVENOUS at 17:59

## 2023-01-01 RX ADMIN — Medication 166.67 MILLIGRAM(S): at 09:45

## 2023-01-01 RX ADMIN — Medication 2.36 MICROGRAM(S)/KG/MIN: at 21:48

## 2023-01-01 RX ADMIN — ETOMIDATE 20 MILLIGRAM(S): 2 INJECTION INTRAVENOUS at 09:30

## 2023-01-01 RX ADMIN — Medication 166.67 MILLIGRAM(S): at 10:51

## 2023-01-01 RX ADMIN — DEXMEDETOMIDINE HYDROCHLORIDE IN 0.9% SODIUM CHLORIDE 3.77 MICROGRAM(S)/KG/HR: 4 INJECTION INTRAVENOUS at 03:42

## 2023-01-01 RX ADMIN — DORZOLAMIDE HYDROCHLORIDE 1 DROP(S): 20 SOLUTION/ DROPS OPHTHALMIC at 05:52

## 2023-01-01 RX ADMIN — DORZOLAMIDE HYDROCHLORIDE 1 DROP(S): 20 SOLUTION/ DROPS OPHTHALMIC at 05:23

## 2023-01-01 RX ADMIN — DEXMEDETOMIDINE HYDROCHLORIDE IN 0.9% SODIUM CHLORIDE 3.77 MICROGRAM(S)/KG/HR: 4 INJECTION INTRAVENOUS at 22:34

## 2023-01-01 RX ADMIN — Medication 4.72 MICROGRAM(S)/KG/MIN: at 20:54

## 2023-01-01 RX ADMIN — Medication 2.36 MICROGRAM(S)/KG/MIN: at 06:53

## 2023-01-01 RX ADMIN — HEPARIN SODIUM 200 UNIT(S)/HR: 5000 INJECTION INTRAVENOUS; SUBCUTANEOUS at 09:21

## 2023-01-01 RX ADMIN — Medication 6: at 18:46

## 2023-01-01 RX ADMIN — HEPARIN SODIUM 0 UNIT(S)/HR: 5000 INJECTION INTRAVENOUS; SUBCUTANEOUS at 17:51

## 2023-01-01 RX ADMIN — DORZOLAMIDE HYDROCHLORIDE 1 DROP(S): 20 SOLUTION/ DROPS OPHTHALMIC at 05:34

## 2023-01-01 RX ADMIN — Medication 3 MILLILITER(S): at 01:21

## 2023-01-01 RX ADMIN — LATANOPROST 1 DROP(S): 0.05 SOLUTION/ DROPS OPHTHALMIC; TOPICAL at 22:06

## 2023-01-01 RX ADMIN — HYDROMORPHONE HYDROCHLORIDE 0.5 MILLIGRAM(S): 2 INJECTION INTRAMUSCULAR; INTRAVENOUS; SUBCUTANEOUS at 12:48

## 2023-01-01 RX ADMIN — CHLORHEXIDINE GLUCONATE 1 APPLICATION(S): 213 SOLUTION TOPICAL at 05:47

## 2023-01-01 RX ADMIN — CHLORHEXIDINE GLUCONATE 1 APPLICATION(S): 213 SOLUTION TOPICAL at 06:50

## 2023-01-01 RX ADMIN — MIDAZOLAM HYDROCHLORIDE 4 MILLIGRAM(S): 1 INJECTION, SOLUTION INTRAMUSCULAR; INTRAVENOUS at 11:03

## 2023-01-01 RX ADMIN — CEFEPIME 1000 MILLIGRAM(S): 1 INJECTION, POWDER, FOR SOLUTION INTRAMUSCULAR; INTRAVENOUS at 05:51

## 2023-01-01 RX ADMIN — CEFEPIME 1000 MILLIGRAM(S): 1 INJECTION, POWDER, FOR SOLUTION INTRAMUSCULAR; INTRAVENOUS at 17:47

## 2023-01-01 RX ADMIN — HEPARIN SODIUM 0 UNIT(S)/HR: 5000 INJECTION INTRAVENOUS; SUBCUTANEOUS at 11:47

## 2023-01-01 RX ADMIN — CISATRACURIUM BESYLATE 9.05 MICROGRAM(S)/KG/MIN: 2 INJECTION INTRAVENOUS at 19:56

## 2023-01-01 RX ADMIN — Medication 36 MICROGRAM(S): at 00:39

## 2023-01-01 RX ADMIN — Medication 2.36 MICROGRAM(S)/KG/MIN: at 05:30

## 2023-01-01 RX ADMIN — Medication 4: at 05:30

## 2023-01-01 RX ADMIN — Medication 3 MILLILITER(S): at 00:14

## 2023-01-01 RX ADMIN — HEPARIN SODIUM 600 UNIT(S)/HR: 5000 INJECTION INTRAVENOUS; SUBCUTANEOUS at 03:25

## 2023-01-01 RX ADMIN — Medication 3 MILLILITER(S): at 05:34

## 2023-01-01 RX ADMIN — Medication 60 MILLIGRAM(S): at 13:17

## 2023-01-01 RX ADMIN — SODIUM CHLORIDE 250 MILLILITER(S): 9 INJECTION, SOLUTION INTRAVENOUS at 17:44

## 2023-01-01 RX ADMIN — Medication 2.36 MICROGRAM(S)/KG/MIN: at 14:16

## 2023-01-01 RX ADMIN — SODIUM CHLORIDE 500 MILLILITER(S): 9 INJECTION INTRAMUSCULAR; INTRAVENOUS; SUBCUTANEOUS at 22:18

## 2023-01-01 RX ADMIN — HEPARIN SODIUM 900 UNIT(S)/HR: 5000 INJECTION INTRAVENOUS; SUBCUTANEOUS at 03:35

## 2023-01-01 RX ADMIN — CHLORHEXIDINE GLUCONATE 15 MILLILITER(S): 213 SOLUTION TOPICAL at 17:48

## 2023-01-01 RX ADMIN — Medication 25 GRAM(S): at 23:34

## 2023-01-01 RX ADMIN — Medication 40 MILLIGRAM(S): at 05:51

## 2023-01-01 RX ADMIN — CHLORHEXIDINE GLUCONATE 15 MILLILITER(S): 213 SOLUTION TOPICAL at 05:47

## 2023-01-01 RX ADMIN — SODIUM CHLORIDE 500 MILLILITER(S): 9 INJECTION INTRAMUSCULAR; INTRAVENOUS; SUBCUTANEOUS at 10:54

## 2023-01-01 RX ADMIN — CEFEPIME 1000 MILLIGRAM(S): 1 INJECTION, POWDER, FOR SOLUTION INTRAMUSCULAR; INTRAVENOUS at 05:21

## 2023-01-01 RX ADMIN — Medication 100 MILLIEQUIVALENT(S): at 10:01

## 2023-01-01 RX ADMIN — CEFEPIME 2000 MILLIGRAM(S): 1 INJECTION, POWDER, FOR SOLUTION INTRAMUSCULAR; INTRAVENOUS at 06:50

## 2023-01-01 RX ADMIN — DORZOLAMIDE HYDROCHLORIDE 1 DROP(S): 20 SOLUTION/ DROPS OPHTHALMIC at 22:00

## 2023-01-01 RX ADMIN — PROPOFOL 1.51 MICROGRAM(S)/KG/MIN: 10 INJECTION, EMULSION INTRAVENOUS at 12:15

## 2023-01-01 RX ADMIN — CEFEPIME 2000 MILLIGRAM(S): 1 INJECTION, POWDER, FOR SOLUTION INTRAMUSCULAR; INTRAVENOUS at 00:39

## 2023-01-01 RX ADMIN — INSULIN GLARGINE 10 UNIT(S): 100 INJECTION, SOLUTION SUBCUTANEOUS at 21:52

## 2023-01-01 RX ADMIN — HEPARIN SODIUM 0 UNIT(S)/HR: 5000 INJECTION INTRAVENOUS; SUBCUTANEOUS at 10:08

## 2023-01-01 RX ADMIN — Medication 6: at 01:23

## 2023-01-01 RX ADMIN — PANTOPRAZOLE SODIUM 40 MILLIGRAM(S): 20 TABLET, DELAYED RELEASE ORAL at 17:48

## 2023-01-01 RX ADMIN — ALBUTEROL 2.5 MILLIGRAM(S): 90 AEROSOL, METERED ORAL at 20:37

## 2023-01-01 RX ADMIN — SODIUM CHLORIDE 50 MILLILITER(S): 9 INJECTION, SOLUTION INTRAVENOUS at 05:51

## 2023-01-01 RX ADMIN — Medication 100 MILLILITER(S): at 03:45

## 2023-01-01 RX ADMIN — Medication 10: at 07:22

## 2023-01-01 RX ADMIN — Medication 63.75 MILLIMOLE(S): at 10:01

## 2023-01-01 RX ADMIN — Medication 3 MILLILITER(S): at 22:17

## 2023-01-01 RX ADMIN — Medication 4.72 MICROGRAM(S)/KG/MIN: at 05:22

## 2023-01-01 RX ADMIN — INSULIN GLARGINE 10 UNIT(S): 100 INJECTION, SOLUTION SUBCUTANEOUS at 21:19

## 2023-01-01 RX ADMIN — Medication 3 MILLILITER(S): at 09:16

## 2023-01-01 RX ADMIN — Medication 60 MILLIGRAM(S): at 00:20

## 2023-01-01 RX ADMIN — HEPARIN SODIUM 0 UNIT(S)/HR: 5000 INJECTION INTRAVENOUS; SUBCUTANEOUS at 02:24

## 2023-01-01 RX ADMIN — Medication 50 MILLIEQUIVALENT(S): at 18:46

## 2023-01-01 RX ADMIN — CISATRACURIUM BESYLATE 9.05 MICROGRAM(S)/KG/MIN: 2 INJECTION INTRAVENOUS at 12:25

## 2023-01-01 RX ADMIN — PANTOPRAZOLE SODIUM 40 MILLIGRAM(S): 20 TABLET, DELAYED RELEASE ORAL at 17:01

## 2023-01-01 RX ADMIN — FENTANYL CITRATE 2.52 MICROGRAM(S)/KG/HR: 50 INJECTION INTRAVENOUS at 10:35

## 2023-01-01 RX ADMIN — DORZOLAMIDE HYDROCHLORIDE 1 DROP(S): 20 SOLUTION/ DROPS OPHTHALMIC at 13:17

## 2023-01-01 RX ADMIN — PROPOFOL 3.02 MICROGRAM(S)/KG/MIN: 10 INJECTION, EMULSION INTRAVENOUS at 09:31

## 2023-01-01 RX ADMIN — Medication 40 MILLIGRAM(S): at 13:23

## 2023-01-01 RX ADMIN — DEXMEDETOMIDINE HYDROCHLORIDE IN 0.9% SODIUM CHLORIDE 3.77 MICROGRAM(S)/KG/HR: 4 INJECTION INTRAVENOUS at 09:46

## 2023-01-01 RX ADMIN — DORZOLAMIDE HYDROCHLORIDE 1 DROP(S): 20 SOLUTION/ DROPS OPHTHALMIC at 21:48

## 2023-01-01 RX ADMIN — Medication 40 MILLIGRAM(S): at 21:11

## 2023-01-01 RX ADMIN — CHLORHEXIDINE GLUCONATE 1 APPLICATION(S): 213 SOLUTION TOPICAL at 05:23

## 2023-01-01 RX ADMIN — MIDODRINE HYDROCHLORIDE 10 MILLIGRAM(S): 2.5 TABLET ORAL at 21:54

## 2023-01-01 RX ADMIN — Medication 2: at 05:23

## 2023-01-01 RX ADMIN — DORZOLAMIDE HYDROCHLORIDE 1 DROP(S): 20 SOLUTION/ DROPS OPHTHALMIC at 13:23

## 2023-01-01 RX ADMIN — Medication 3 MILLILITER(S): at 12:25

## 2023-01-01 RX ADMIN — HEPARIN SODIUM 400 UNIT(S)/HR: 5000 INJECTION INTRAVENOUS; SUBCUTANEOUS at 21:51

## 2023-01-01 RX ADMIN — HEPARIN SODIUM 700 UNIT(S)/HR: 5000 INJECTION INTRAVENOUS; SUBCUTANEOUS at 18:49

## 2023-01-01 RX ADMIN — Medication 2.36 MICROGRAM(S)/KG/MIN: at 21:13

## 2023-01-01 RX ADMIN — CHLORHEXIDINE GLUCONATE 15 MILLILITER(S): 213 SOLUTION TOPICAL at 17:01

## 2023-01-01 RX ADMIN — Medication 12.5 GRAM(S): at 22:22

## 2023-01-01 RX ADMIN — CEFEPIME 1000 MILLIGRAM(S): 1 INJECTION, POWDER, FOR SOLUTION INTRAMUSCULAR; INTRAVENOUS at 17:01

## 2023-01-01 RX ADMIN — AZITHROMYCIN 255 MILLIGRAM(S): 500 TABLET, FILM COATED ORAL at 22:35

## 2023-01-01 RX ADMIN — DEXMEDETOMIDINE HYDROCHLORIDE IN 0.9% SODIUM CHLORIDE 3.77 MICROGRAM(S)/KG/HR: 4 INJECTION INTRAVENOUS at 01:13

## 2023-01-01 RX ADMIN — Medication 3 MILLILITER(S): at 21:52

## 2023-01-01 RX ADMIN — PROPOFOL 1.51 MICROGRAM(S)/KG/MIN: 10 INJECTION, EMULSION INTRAVENOUS at 22:14

## 2023-01-01 RX ADMIN — Medication 3.77 MICROGRAM(S)/KG/MIN: at 09:31

## 2023-01-01 RX ADMIN — DEXMEDETOMIDINE HYDROCHLORIDE IN 0.9% SODIUM CHLORIDE 3.77 MICROGRAM(S)/KG/HR: 4 INJECTION INTRAVENOUS at 05:31

## 2023-01-01 RX ADMIN — Medication 60 MILLIGRAM(S): at 17:26

## 2023-01-01 RX ADMIN — HEPARIN SODIUM 300 UNIT(S)/HR: 5000 INJECTION INTRAVENOUS; SUBCUTANEOUS at 19:03

## 2023-01-01 RX ADMIN — Medication 12.5 GRAM(S): at 16:18

## 2023-01-01 RX ADMIN — Medication 60 MILLIGRAM(S): at 05:47

## 2023-01-01 RX ADMIN — DEXMEDETOMIDINE HYDROCHLORIDE IN 0.9% SODIUM CHLORIDE 3.77 MICROGRAM(S)/KG/HR: 4 INJECTION INTRAVENOUS at 13:32

## 2023-01-01 RX ADMIN — HEPARIN SODIUM 300 UNIT(S)/HR: 5000 INJECTION INTRAVENOUS; SUBCUTANEOUS at 02:37

## 2023-01-01 RX ADMIN — CISATRACURIUM BESYLATE 9.05 MICROGRAM(S)/KG/MIN: 2 INJECTION INTRAVENOUS at 02:36

## 2023-01-01 RX ADMIN — Medication 2.36 MICROGRAM(S)/KG/MIN: at 21:11

## 2023-01-01 RX ADMIN — DEXMEDETOMIDINE HYDROCHLORIDE IN 0.9% SODIUM CHLORIDE 3.77 MICROGRAM(S)/KG/HR: 4 INJECTION INTRAVENOUS at 11:54

## 2023-01-01 RX ADMIN — Medication 75 MEQ/KG/HR: at 04:25

## 2023-01-01 RX ADMIN — CEFEPIME 1000 MILLIGRAM(S): 1 INJECTION, POWDER, FOR SOLUTION INTRAMUSCULAR; INTRAVENOUS at 05:31

## 2023-01-01 RX ADMIN — HEPARIN SODIUM 0 UNIT(S)/HR: 5000 INJECTION INTRAVENOUS; SUBCUTANEOUS at 20:50

## 2023-01-01 RX ADMIN — Medication 0.5 MILLIGRAM(S): at 09:17

## 2023-01-01 RX ADMIN — Medication 60 MILLIGRAM(S): at 13:13

## 2023-01-01 RX ADMIN — Medication 3 MILLILITER(S): at 00:05

## 2023-01-01 RX ADMIN — Medication 36 MICROGRAM(S): at 21:59

## 2023-01-01 RX ADMIN — Medication 4.72 MICROGRAM(S)/KG/MIN: at 13:23

## 2023-01-01 RX ADMIN — LATANOPROST 1 DROP(S): 0.05 SOLUTION/ DROPS OPHTHALMIC; TOPICAL at 00:16

## 2023-01-01 RX ADMIN — Medication 125 MILLIGRAM(S): at 20:37

## 2023-01-01 RX ADMIN — Medication 3 MILLILITER(S): at 15:56

## 2023-01-01 RX ADMIN — Medication 60 MILLIGRAM(S): at 00:16

## 2023-01-01 RX ADMIN — Medication 3 MILLILITER(S): at 16:03

## 2023-01-01 RX ADMIN — Medication 8: at 00:13

## 2023-01-01 RX ADMIN — DORZOLAMIDE HYDROCHLORIDE 1 DROP(S): 20 SOLUTION/ DROPS OPHTHALMIC at 13:14

## 2023-01-01 RX ADMIN — Medication 36 MICROGRAM(S): at 21:11

## 2023-01-01 RX ADMIN — CHLORHEXIDINE GLUCONATE 1 APPLICATION(S): 213 SOLUTION TOPICAL at 05:31

## 2023-01-01 RX ADMIN — DEXMEDETOMIDINE HYDROCHLORIDE IN 0.9% SODIUM CHLORIDE 3.77 MICROGRAM(S)/KG/HR: 4 INJECTION INTRAVENOUS at 21:35

## 2023-01-01 RX ADMIN — AZITHROMYCIN 255 MILLIGRAM(S): 500 TABLET, FILM COATED ORAL at 23:07

## 2023-01-01 RX ADMIN — MIDODRINE HYDROCHLORIDE 10 MILLIGRAM(S): 2.5 TABLET ORAL at 05:51

## 2023-01-01 RX ADMIN — Medication 3 MILLILITER(S): at 15:34

## 2023-01-01 RX ADMIN — MIDODRINE HYDROCHLORIDE 10 MILLIGRAM(S): 2.5 TABLET ORAL at 22:08

## 2023-01-01 RX ADMIN — DEXMEDETOMIDINE HYDROCHLORIDE IN 0.9% SODIUM CHLORIDE 3.77 MICROGRAM(S)/KG/HR: 4 INJECTION INTRAVENOUS at 01:03

## 2023-01-01 RX ADMIN — Medication 0.5 MILLIGRAM(S): at 19:25

## 2023-01-01 RX ADMIN — Medication 3 MILLILITER(S): at 08:59

## 2023-01-01 RX ADMIN — CHLORHEXIDINE GLUCONATE 15 MILLILITER(S): 213 SOLUTION TOPICAL at 05:22

## 2023-01-01 RX ADMIN — DEXMEDETOMIDINE HYDROCHLORIDE IN 0.9% SODIUM CHLORIDE 3.77 MICROGRAM(S)/KG/HR: 4 INJECTION INTRAVENOUS at 06:34

## 2023-01-01 RX ADMIN — HEPARIN SODIUM 800 UNIT(S)/HR: 5000 INJECTION INTRAVENOUS; SUBCUTANEOUS at 11:08

## 2023-01-01 RX ADMIN — Medication 36 MICROGRAM(S): at 21:48

## 2023-01-01 RX ADMIN — DORZOLAMIDE HYDROCHLORIDE 1 DROP(S): 20 SOLUTION/ DROPS OPHTHALMIC at 22:05

## 2023-01-01 RX ADMIN — Medication 3 MILLILITER(S): at 15:27

## 2023-01-01 RX ADMIN — CEFEPIME 1000 MILLIGRAM(S): 1 INJECTION, POWDER, FOR SOLUTION INTRAMUSCULAR; INTRAVENOUS at 17:20

## 2023-01-01 RX ADMIN — PROPOFOL 1.51 MICROGRAM(S)/KG/MIN: 10 INJECTION, EMULSION INTRAVENOUS at 02:54

## 2023-01-01 RX ADMIN — VASOPRESSIN 6 UNIT(S)/MIN: 20 INJECTION INTRAVENOUS at 01:13

## 2023-01-01 RX ADMIN — VASOPRESSIN 6 UNIT(S)/MIN: 20 INJECTION INTRAVENOUS at 18:48

## 2023-01-01 RX ADMIN — HYDROMORPHONE HYDROCHLORIDE 0.5 MILLIGRAM(S): 2 INJECTION INTRAMUSCULAR; INTRAVENOUS; SUBCUTANEOUS at 13:22

## 2023-01-01 RX ADMIN — SODIUM CHLORIDE 500 MILLILITER(S): 9 INJECTION INTRAMUSCULAR; INTRAVENOUS; SUBCUTANEOUS at 15:34

## 2023-01-01 RX ADMIN — Medication 60 MILLIGRAM(S): at 05:22

## 2023-01-01 RX ADMIN — Medication 6: at 12:30

## 2023-01-01 RX ADMIN — PROPOFOL 1.51 MICROGRAM(S)/KG/MIN: 10 INJECTION, EMULSION INTRAVENOUS at 10:39

## 2023-01-01 RX ADMIN — CISATRACURIUM BESYLATE 9.05 MICROGRAM(S)/KG/MIN: 2 INJECTION INTRAVENOUS at 11:06

## 2023-01-01 RX ADMIN — Medication 2: at 17:47

## 2023-01-01 RX ADMIN — Medication 40 MILLIGRAM(S): at 05:31

## 2023-01-01 RX ADMIN — Medication 40 MILLIGRAM(S): at 05:23

## 2023-01-01 RX ADMIN — Medication 3 MILLILITER(S): at 08:34

## 2023-01-01 RX ADMIN — Medication 0.5 MILLIGRAM(S): at 20:38

## 2023-01-01 RX ADMIN — Medication 75 MEQ/KG/HR: at 15:09

## 2023-01-01 RX ADMIN — PANTOPRAZOLE SODIUM 40 MILLIGRAM(S): 20 TABLET, DELAYED RELEASE ORAL at 05:51

## 2023-01-01 RX ADMIN — DEXMEDETOMIDINE HYDROCHLORIDE IN 0.9% SODIUM CHLORIDE 3.77 MICROGRAM(S)/KG/HR: 4 INJECTION INTRAVENOUS at 13:23

## 2023-01-01 RX ADMIN — Medication 2.36 MICROGRAM(S)/KG/MIN: at 18:01

## 2023-01-01 RX ADMIN — Medication 3 MILLILITER(S): at 20:52

## 2023-01-01 RX ADMIN — Medication 3.77 MICROGRAM(S)/KG/MIN: at 10:39

## 2023-01-01 RX ADMIN — Medication 60 MILLIGRAM(S): at 17:48

## 2023-01-01 RX ADMIN — MIDODRINE HYDROCHLORIDE 10 MILLIGRAM(S): 2.5 TABLET ORAL at 05:30

## 2023-01-01 RX ADMIN — Medication 50 MILLIEQUIVALENT(S): at 18:43

## 2023-01-01 RX ADMIN — Medication 250 MILLIGRAM(S): at 01:41

## 2023-01-01 RX ADMIN — LATANOPROST 1 DROP(S): 0.05 SOLUTION/ DROPS OPHTHALMIC; TOPICAL at 21:58

## 2023-01-01 RX ADMIN — Medication 3 MILLILITER(S): at 16:46

## 2023-01-01 RX ADMIN — Medication 4.72 MICROGRAM(S)/KG/MIN: at 17:20

## 2023-01-01 RX ADMIN — PANTOPRAZOLE SODIUM 40 MILLIGRAM(S): 20 TABLET, DELAYED RELEASE ORAL at 05:20

## 2023-01-01 RX ADMIN — Medication 3 MILLILITER(S): at 09:17

## 2023-01-01 RX ADMIN — OXYMETAZOLINE HYDROCHLORIDE 2 SPRAY(S): 0.5 SPRAY NASAL at 08:25

## 2023-01-01 RX ADMIN — LATANOPROST 1 DROP(S): 0.05 SOLUTION/ DROPS OPHTHALMIC; TOPICAL at 21:48

## 2023-01-01 RX ADMIN — HEPARIN SODIUM 200 UNIT(S)/HR: 5000 INJECTION INTRAVENOUS; SUBCUTANEOUS at 23:11

## 2023-01-01 RX ADMIN — Medication 40 MILLIGRAM(S): at 06:50

## 2023-01-01 RX ADMIN — MIDODRINE HYDROCHLORIDE 10 MILLIGRAM(S): 2.5 TABLET ORAL at 13:14

## 2023-01-01 RX ADMIN — SODIUM CHLORIDE 250 MILLILITER(S): 9 INJECTION, SOLUTION INTRAVENOUS at 10:39

## 2023-01-01 RX ADMIN — DEXMEDETOMIDINE HYDROCHLORIDE IN 0.9% SODIUM CHLORIDE 3.77 MICROGRAM(S)/KG/HR: 4 INJECTION INTRAVENOUS at 04:12

## 2023-01-01 RX ADMIN — HEPARIN SODIUM 400 UNIT(S)/HR: 5000 INJECTION INTRAVENOUS; SUBCUTANEOUS at 11:01

## 2023-01-01 RX ADMIN — Medication 3 MILLILITER(S): at 12:37

## 2023-01-01 RX ADMIN — CHLORHEXIDINE GLUCONATE 1 APPLICATION(S): 213 SOLUTION TOPICAL at 05:26

## 2023-01-01 RX ADMIN — DEXMEDETOMIDINE HYDROCHLORIDE IN 0.9% SODIUM CHLORIDE 3.77 MICROGRAM(S)/KG/HR: 4 INJECTION INTRAVENOUS at 20:14

## 2023-01-01 RX ADMIN — Medication 100 MILLIGRAM(S): at 08:57

## 2023-01-01 RX ADMIN — Medication 6: at 11:55

## 2023-01-01 RX ADMIN — DEXMEDETOMIDINE HYDROCHLORIDE IN 0.9% SODIUM CHLORIDE 3.77 MICROGRAM(S)/KG/HR: 4 INJECTION INTRAVENOUS at 17:20

## 2023-01-01 RX ADMIN — Medication 2: at 00:42

## 2023-01-01 RX ADMIN — Medication 3 MILLILITER(S): at 04:35

## 2023-01-01 RX ADMIN — DORZOLAMIDE HYDROCHLORIDE 1 DROP(S): 20 SOLUTION/ DROPS OPHTHALMIC at 05:25

## 2023-01-01 RX ADMIN — DORZOLAMIDE HYDROCHLORIDE 1 DROP(S): 20 SOLUTION/ DROPS OPHTHALMIC at 00:16

## 2023-01-01 RX ADMIN — AZITHROMYCIN 255 MILLIGRAM(S): 500 TABLET, FILM COATED ORAL at 00:39

## 2023-01-01 RX ADMIN — HEPARIN SODIUM 500 UNIT(S)/HR: 5000 INJECTION INTRAVENOUS; SUBCUTANEOUS at 12:47

## 2023-01-01 RX ADMIN — VASOPRESSIN 6 UNIT(S)/MIN: 20 INJECTION INTRAVENOUS at 03:41

## 2023-01-01 RX ADMIN — Medication 6.29 MICROGRAM(S)/KG/MIN: at 23:16

## 2023-01-01 RX ADMIN — HEPARIN SODIUM 200 UNIT(S)/HR: 5000 INJECTION INTRAVENOUS; SUBCUTANEOUS at 15:48

## 2023-01-01 RX ADMIN — Medication 2: at 05:52

## 2023-01-01 RX ADMIN — INSULIN HUMAN 8 UNIT(S)/HR: 100 INJECTION, SOLUTION SUBCUTANEOUS at 08:57

## 2023-01-01 RX ADMIN — CEFEPIME 1000 MILLIGRAM(S): 1 INJECTION, POWDER, FOR SOLUTION INTRAMUSCULAR; INTRAVENOUS at 17:48

## 2023-01-01 RX ADMIN — Medication 50 MILLIEQUIVALENT(S): at 10:47

## 2023-01-01 RX ADMIN — Medication 3 MILLILITER(S): at 20:36

## 2023-01-01 RX ADMIN — Medication 40 MILLIGRAM(S): at 13:32

## 2023-01-01 RX ADMIN — Medication 40 MILLIGRAM(S): at 22:34

## 2023-01-19 NOTE — ED PROVIDER NOTE - NSFOLLOWUPINSTRUCTIONS_ED_ALL_ED_FT
Follow up with your primary care provider.  Come back for lightheadedness, chest pain, difficulty breathing, nausea, vomiting, abdominal pain.    Nosebleed, Adult      A nosebleed is when blood comes out of the nose. Nosebleeds are common. Usually, they are not a sign of a serious condition.    Nosebleeds can happen if a blood vessel in your nose starts to bleed or if the lining of your nose (mucous membrane) cracks. They are commonly caused by:  •Allergies.      •Colds.      •Picking your nose.      •Blowing your nose too hard.      •An injury from sticking an object into your nose or getting hit in the nose.      •Dry or cold air.      Less common causes of nosebleeds include:  •Toxic fumes.      •Something abnormal in the nose or in the air-filled spaces in the bones of the face (sinuses).      •Growths in the nose, such as polyps.      •Blood thinners or conditions that cause blood to clot slowly.      •Certain illnesses or procedures that irritate or dry out the nasal passages.        Follow these instructions at home:      When you have a nosebleed:      •Sit down and tilt your head slightly forward.      •Use a clean towel or tissue to pinch your nostrils under the bony part of your nose. After 5 minutes, let go of your nose and see if bleeding starts again. Do not release pressure before that time. If there is still bleeding, repeat the pinching and holding for 5 minutes or until the bleeding stops.      • Do not place tissues or gauze in the nose to stop the bleeding.      •Avoid lying down and avoid tilting your head backward. That may make blood collect in the throat and cause gagging or coughing.      •Use a nasal spray decongestant to help with a nosebleed as told by your health care provider.      After a nosebleed:     •Avoid blowing your nose or sniffing for a number of hours.      •Avoid straining, lifting, or bending at the waist for several days. You may go back to other normal activities as you are able.    •If you are taking aspirin or blood thinners and you have nosebleeds, talk to your health care provider. These medicines make bleeding more likely.  •Ask your health care provider if you should stop taking the medicines or if you should adjust the dose.      •Do not stop taking medicines that your health care provider has recommended unless he or she tells you to stop taking them.      •If your nosebleed was caused by dry mucous membranes, use over-the-counter saline nasal spray or gel and a humidifier as told by your health care provider. This will keep the mucous membranes moist and allow them to heal. If you need to use one of these products:  •Choose one that is water-soluble.      •Use only as much as you need and use it only as often as needed.      •Do not lie down right after you use it.      •If you get nosebleeds often, talk with your health care provider about medical treatments. Options may include:  •Nasal cautery. This treatment stops and prevents nosebleeds by using a chemical swab or electrical device to lightly burn tiny blood vessels inside the nose.      •Nasal packing. A gauze or other material is placed in the nose to keep constant pressure on the bleeding area.          Contact a health care provider if you:    •Have a fever.      •Get nosebleeds often or more often than usual.      •Bruise very easily.      •Have a nosebleed from having something stuck in your nose.      •Have bleeding in your mouth.      •Vomit or cough up brown material.      •Have a nosebleed after you start a new medicine.        Get help right away if:    •You have a nosebleed after a fall or a head injury.      •Your nosebleed does not go away after 20 minutes.      •You feel dizzy or weak.      •You have unusual bleeding from other parts of your body.      •You have unusual bruising on other parts of your body.      •You become sweaty.      •You vomit blood.        Summary    •A nosebleed is when blood comes out of the nose. Common causes include allergies, an injury to the nose, or cold or dry air.      •Initial treatment includes applying pressure for 5 minutes.       •Moisturizing the nose with saline nasal spray or gel after a nosebleed may help prevent future bleeding.      •Get help right away if your nosebleed does not go away after 20 minutes.      This information is not intended to replace advice given to you by your health care provider. Make sure you discuss any questions you have with your health care provider.

## 2023-01-19 NOTE — ED PROVIDER NOTE - PATIENT PORTAL LINK FT
You can access the FollowMyHealth Patient Portal offered by [facility] by registering at the following website: [email or web address]. By joining [facility]’s FollowMyHealth portal, you will also be able to view your health information using other applications (apps) compatible with our system.

## 2023-01-19 NOTE — ED ADULT NURSE NOTE - BREATH SOUNDS, LEFT
Pt A&Ox4, walks well. Has ambulated 2 times. 1st time she walked 3 of 4 halls, the second time she walked all 4 halls w/o issue. Diet is clears and she is tolerating well. Pt continued to request juarez to be removed. I removed the juarez around 1730. Midline incision covered with foam dressing. Pt has PCA Dilaudid 0.2mg [id number] with a 2mg lockout. Reports pain 8/10. No tele, Room Air. Pt has been asked d/t PCA to only ambulate with staff, she has thus far been compliant. Call light in reach, non skid socks in use personal items in reach. Will continue to monitor     clear

## 2023-01-19 NOTE — ED PROVIDER NOTE - ATTENDING CONTRIBUTION TO CARE
I personally saw the patient with the resident, and completed the key components of the history and physical exam. I then discussed the management plan with the resident.    74 y/o F with PMH HTN, A fib, DM presents for epistaxis since 3 am. Stopped taking Coumadin 6 days ago. No other symptoms.    PE - NAD, well appearing, anterior epistaxis from right naris, no bleeding in posterior oropharynx, RRR, lungs CTA B/L, no pallor.    Patient had improvement with direct pressure and afrin, labs unremarkable - nasal precautions and reassurance provided.

## 2023-01-19 NOTE — ED ADULT TRIAGE NOTE - CHIEF COMPLAINT QUOTE
Patient BIBEMS c/o spontaneous nose bleed as of 3am. Patient stopped taking Warfarin last week. Actively bleeding in triage.

## 2023-01-19 NOTE — ED PROVIDER NOTE - PHYSICAL EXAMINATION
General: well appearing, NAD  Head: NC, AT  EENT: EOMI, no scleral icterus, epistaxis on the right.   Cardiac: RRR, no apparent murmurs, no lower extremity edema  Respiratory: CTABL, no respiratory distress   Abdomen: soft, ND, NT, nonperitonitic  MSK/Vascular: full ROM, soft compartments, warm extremities  Neuro: AAOx3, sensation to light touch intact  Psych: calm, cooperative

## 2023-01-19 NOTE — ED PROVIDER NOTE - NS ED ROS FT
Constitutional: no fever, no chills  Head: NC, AT   Eyes: no redness   ENMT: no nasal congestion/drainage, no sore throat. +right sided epistaxis  CV: no chest pain, no edema  Resp: no cough, no dyspnea  GI: no abdominal pain, no nausea, no vomiting, no diarrhea  : no dysuria, no hematuria   Skin: no lesions, no rashes   Neuro: no LOC, no headache, no sensory deficits, no weakness

## 2023-01-19 NOTE — ED ADULT NURSE NOTE - OBJECTIVE STATEMENT
Pt presents to ED c/o nosebleed beginning this morning. Pt seen at [facility] over the weekend for stroke-like symptoms. Pt previously taking blood thinners for mechanical heart valve which she was told to stop taking as per MD orders. Denies chest pain, SOB, abdominal pain, headache, or dizziness. Bleeding controlled. Pt educated on plan of care and expresses understanding.
Awake/Alert/Cooperative

## 2023-01-19 NOTE — ED PROVIDER NOTE - CLINICAL SUMMARY MEDICAL DECISION MAKING FREE TEXT BOX
Pt presented with epistaxis. Last warfarin was saturday. INR 2.7 today. Afrin given. Propper pressure applied. Nose bleed stopped on reassess. Pt comfortable with DC.

## 2023-01-19 NOTE — ED PROVIDER NOTE - OBJECTIVE STATEMENT
73 y f with pmh of hld, a fib, sarcoid, dm, hypothyroidism, presenting for epistaxis. Started at 3 am today. At presentation pt was sitting back, collecting the blood in a towel but not pinching the nose. Pt had previously been on warfarin but stopped taking 6 days ago after being supra therapeutic. Pt denies lightheadedness, cp, sob n/v, ab pain, dysuria, cough, congestion.

## 2023-04-26 NOTE — RAPID RESPONSE TEAM SUMMARY - NSADDTLFINDINGSRRT_GEN_ALL_CORE
BP: 87/53  HR: 88  RR: 28  SpO2: 95% on room air  Blood sugar: 159    Lungs: +wheeze, +rales  Heart: RRR, +S1/S2

## 2023-04-26 NOTE — ED PROVIDER NOTE - NSICDXPASTSURGICALHX_GEN_ALL_CORE_FT
PAST SURGICAL HISTORY:  Elective surgery back surgury    H/O mitral valve replacement     ICD (implantable cardioverter-defibrillator) in place s/p DDDR ICD '99, gen change '03, BiV upgrade '08, gen change 2014

## 2023-04-26 NOTE — H&P ADULT - PROBLEM SELECTOR PLAN 1
likely multifactorial, infectious process, congestion in lungs on exam , chf ? sarcoidosis all contributing  bipap  [facility] consult appreciated  pt. hypotensive with resp. failure so accepted to [facility] for breathing and circulatory support with pressors.   neb tx and steroids on board.

## 2023-04-26 NOTE — CONSULT NOTE ADULT - SUBJECTIVE AND OBJECTIVE BOX
GENERAL SURGERY CONSULT NOTE  --------------------------------------------------------------------------------------------    HPI: Patient is a 73y old Female who is admitted for human metapneumovirus infection requiring O2 supplementation. Patient was incidentally found to have GB distension with stones and upper limit normal wall thickening. Per conversation with patient's daughter, patient has not had any abdominal pain, N/V, fevers, chills, or post prandial pain. The stones were known and had been seen by a specialist but they had been advised that she would be higher risk for surgery and so it was recommended to avoid surgical intervention unless she becomes sick from the stones or gallbladder.       PAST MEDICAL & SURGICAL HISTORY:  Hypothyroid  Diabetes  HTN (hypertension)  Cataract  Mitral valve disease  s/p MVR  Sarcoid  involving lungs, heart, ears and eyes  Glaucoma  Paroxysmal atrial fibrillation  VT (ventricular tachycardia)  [facility] refused VT ablation  Nonischemic cardiomyopathy  EF <20%; DDDR ICD '99; gen change '03, BiV upgrade '08, gen change '2014  Systolic heart failure  HLD (hyperlipidemia)      Elective surgery  back [facility]  H/O mitral valve replacement  ICD (implantable cardioverter-defibrillator) in place  s/p DDDR ICD '99, gen change '03, BiV upgrade '08, gen change 2014        FAMILY HISTORY:  No pertinent family history in first degree relatives    [] Family history not pertinent as reviewed with the patient and family    SOCIAL HISTORY:      ALLERGIES: No Known Allergies      HOME MEDICATIONS:     CURRENT MEDICATIONS  MEDICATIONS (STANDING): sodium chloride 0.9% Bolus 1000 milliLiter(s) IV Bolus once  warfarin 1 milliGRAM(s) Oral once    MEDICATIONS (PRN):  --------------------------------------------------------------------------------------------    Vitals:   T(C): 36.7 (04-26-23 @ 19:31), Max: 36.7 (04-26-23 @ 16:08)  HR: 86 (04-26-23 @ 21:41) (78 - 89)  BP: 87/53 (04-26-23 @ 21:41) (87/53 - 115/52)  RR: 25 (04-26-23 @ 21:41) (16 - 25)  SpO2: 95% (04-26-23 @ 21:41) (95% - 98%)  CAPILLARY BLOOD GLUCOSE  POCT Blood Glucose.: 159 mg/dL (26 Apr 2023 21:56)      Weight (kg): 67.1 (04-26 @ 12:52)      PHYSICAL EXAM:   General: NAD, Lying in bed comfortably  Resp: audible wheezing and increased work of breathing  GI/Abd: Soft, NT/ND, no rebound/guarding, no masses palpated  Vascular: All 4 extremities warm.  Skin: Intact, no breakdown    --------------------------------------------------------------------------------------------    LABS  CBC (04-26 @ 14:37)                              10.6<L>                         11.98<H>  )----------------(  102<L>     85.9<H>% Neutrophils, 6.5<L>% Lymphocytes, ANC: 10.31<H>                              32.7<L>    BMP (04-26 @ 14:37)             135     |  97      |  33.0<H>		Ca++ --      Ca 9.3                ---------------------------------( 166<H>		Mg 2.3                4.0     |  24.0    |  1.67<H>			Ph --        LFTs (04-26 @ 14:37)      TPro 7.2 / Alb 3.3 / TBili 0.6 / DBili -- / AST 1247<H> / ALT 1074<H> / AlkPhos 198<H>    Coags (04-26 @ 14:37)  aPTT 43.8<H> / INR 2.06<H> / PT 24.1<H>      ABG (04-26 @ 22:06)     7.420 / 32 / 64<L> / 21 / -3.7<L> / 94.4%     Lactate:      VBG (04-26 @ 22:06)     -- / -- / -- / -- / -- / --%     Lactate: 2.90<H>  VBG (04-26 @ 14:37)     7.410 / 39 / 167<H> / 25 / 0.1 / 99.0%     Lactate: 2.20<H>    --------------------------------------------------------------------------------------------    IMAGING  < from: US Abdomen Upper Quadrant Right (04.26.23 @ 18:22) >  FINDINGS:  Liver: Within normal limits.  Bile ducts: Normal caliber. Common bile duct measures 6 mm.  Gallbladder: Distended with multiple stones with wall thickening of 4 to   5 mm. Sonographic Muse's sign is equivocal, in this patient pretreated   with analgesics.  Pancreas: Not well visualized.  Right kidney: 8.1 cm. No hydronephrosis. Cortical thinning and overall   atrophy.  Ascites: None.  IVC: Visualized portions are within normal limits.    IMPRESSION:  Gallstones with additional findings equivocal for acute cholecystitis. A   HIDA scan is advised for further evaluation.    < end of copied text >      --------------------------------------------------------------------------------------------

## 2023-04-26 NOTE — H&P ADULT - PROBLEM SELECTOR PLAN 8
transaminitis possible shock liver from chf, infectious process related ? surgery team saw pt. no concern for acute yousif. trend liver enzymes. hepatitis panel. er called GI consult.

## 2023-04-26 NOTE — ED PROVIDER NOTE - CLINICAL SUMMARY MEDICAL DECISION MAKING FREE TEXT BOX
Although Pt has stable vital, SpO2, is able to eat and drink, looks tired for SOB. Denies any GI symptom. Lab revealed mild leucocytosis, elevated Cr, and eleveted LFTs, BNP high as baseline. CXR shows no sign of HF. D-dimer negative. Will add hepatitis panel and US RUQ. Daughter wants to bring Pt back to home. Cardiology consulted. Will reassess.

## 2023-04-26 NOTE — ED PROVIDER NOTE - NSICDXPASTMEDICALHX_GEN_ALL_CORE_FT
PAST MEDICAL HISTORY:  Cataract     Diabetes     Glaucoma     HLD (hyperlipidemia)     HTN (hypertension)     Hypothyroid     Mitral valve disease s/p MVR    Nonischemic cardiomyopathy EF <20%; DDDR ICD '99; gen change '03, BiV upgrade '08, gen change '2014    Paroxysmal atrial fibrillation     Sarcoid involving lungs, heart, ears and eyes    Systolic heart failure     VT (ventricular tachycardia) [facility] refused VT ablation

## 2023-04-26 NOTE — H&P ADULT - PROBLEM SELECTOR PLAN 3
pt. will possibly benefit from iv diuresis once bp stable. echo to follow. hold entresto for now . ER called cardiology consult pt. will possibly benefit from iv diuresis once bp stable. echo to follow. hold entresto for now . ER called cardiology consult  transaminitis possible shock liver from chf, infectious process related ? surgery team saw pt. no concern for acute yousif. trend liver enzymes. er called GI consult.

## 2023-04-26 NOTE — H&P ADULT - NSHPPHYSICALEXAM_GEN_ALL_CORE
Vital Signs Last 24 Hrs  T(C): 36.7 (26 Apr 2023 19:31), Max: 36.7 (26 Apr 2023 16:08)  T(F): 98 (26 Apr 2023 19:31), Max: 98 (26 Apr 2023 16:08)  HR: 90 (26 Apr 2023 23:07) (78 - 90)  BP: 90/50 (26 Apr 2023 23:07) (66/47 - 115/52)  BP(mean): 65 (26 Apr 2023 23:07) (65 - 73)  RR: 23 (26 Apr 2023 23:07) (16 - 32)  SpO2: 100% (26 Apr 2023 23:07) (95% - 100%)    Parameters below as of 26 Apr 2023 23:07  Patient On (Oxygen Delivery Method): BiPAP/CPAP    General: pt. in bed noted to be in respiratory distress  eyes : PERRL. intact EOM  HENT: AT, NC. no throat erythema or exudate.   Neck: supple. no JVD.   Chest: coarse rhonchi B/L, inter costal retractions noted.  Heart: S1,S2. RRR. no heart murmur. no edema.   Abdomen: soft. non-tender in all abd. quadrants, non-distended. + BS.   Ext: no calf tenderness. moves all ext. independently  Neuro: tired appearing but alert and follows commands. motor / sensory intact. no focal weakness.   vascular : distal pulses intact  Skin: warm and dry  psych : co-operative, no agitation or restlessness.

## 2023-04-26 NOTE — ED PROVIDER NOTE - OBJECTIVE STATEMENT
A 73 y f with pmh of Afib on coumadin, HTN, sarcoid, DM, hypothyroidism, cardiomyopathy, s/p MVR ICD, presents c/o SOB x 2 days, coughing with yellow. Her daughter is at bedside. Reports that Pt saw her cardiologist Dr. Barfield yesterday who said Pt may have "lung issue". Denies fevers, chills, headache, chest pain, palpitations, nausea, vomiting, diarrhea, hematuria, dysuria, dark stools, or focal neurologic symptoms. A 73 y f with pmh of Afib on coumadin, HTN, sarcoid, DM, hypothyroidism, cardiomyopathy, s/p MVR ICD, presents c/o SOB x 2 days, coughing with yellow. Her daughter is at bedside. Reports that Pt saw her cardiologist Dr. Barfield yesterday who said Pt may have "lung issue". Denies fevers, chills, headache, chest pain, palpitations, nausea, vomiting, diarrhea, hematuria, dysuria, dark stools, or focal neurologic symptoms. Daughter reports Pt's  admitted for hospital for hMVP URI.

## 2023-04-26 NOTE — ED ADULT TRIAGE NOTE - CHIEF COMPLAINT QUOTE
Patient c/o worsening SOB x2 days.  at home has PNA. Denies chest pain. Cough noted in triage. Hx of CHF, pacemaker, defibrillator.

## 2023-04-26 NOTE — H&P ADULT - PROBLEM SELECTOR PLAN 4
pt. is on coumadin but letahrgic and going on bipap so po meds on hold for now, will be on iv heparin.

## 2023-04-26 NOTE — ED PROVIDER NOTE - ATTENDING CONTRIBUTION TO CARE
Dr. Godfrey, Attending Physician-  I performed a face to face bedside interview with patient regarding history of present illness, review of symptoms and past medical history. I completed an independent physical exam.  I have discussed patient's plan of care with the resident.

## 2023-04-26 NOTE — H&P ADULT - NSHPSOURCEINFORD_GEN_ALL_CORE
Victorino Annamarie   1971    CC:  Yearly exam    S:  45 y o  female here for yearly exam    She had [address] BS, L oophorectomy, endometriosis ablation --> benign pathology  (Completed May 2021)    Starting to have an occasional hot flash, has not been too bothersome  She is not having vaginal bleeding, discharge, pelvic pain, or other gyn concerns  History of ELIANA II on biopsy in 3/2018  She denies vaginal discharge, itching, odor or dryness  She has no urinary, bowel, breast concerns  She is sexually active without pain, bleeding or dryness      Last Pap - 9/14/20 - NILM, Neg HPV  Last Mammo 7/22/22 - BiRad 2  Last Cologard 2020 - due 2023     Family hx of breast cancer: no  Family hx of ovarian cancer: no  Family hx of colon cancer: no      Current Outpatient Medications:   •  Cholecalciferol (VITAMIN D3 PO), Take 2,000 Units by mouth daily , Disp: , Rfl:   •  fluticasone (FLONASE) 50 mcg/act nasal spray, 1 spray into each nostril daily, Disp: 18 g, Rfl: 0  •  loratadine (CLARITIN) 10 mg tablet, Take 10 mg by mouth daily, Disp: , Rfl:   Patient Active Problem List   Diagnosis   • Hematuria   • Mammogram abnormal   • Pleomorphic adenoma of submandibular gland   • Submandibular gland hypertrophy   • Increased frequency of urination   • Vitamin D deficiency   • Moderate dysplasia of cervix (ELIANA II)   • Neck pain   • Muscle spasm   • Contact dermatitis due to poison ivy   • Iron deficiency anemia   • Menstrual irregularity   • Plantar fasciitis, bilateral   • Left ovarian cyst   • Motion sickness   • History of robot-assisted laparoscopic hysterectomy   • Lipoma of torso     Past Medical History:   Diagnosis Date   • Abnormal Pap smear of cervix    • Acute sinusitis    • Allergic rhinitis     Onset: [id number]   • Anemia    • Dysplasia of cervix     MILD  LAST ASSESSED 8/18/2014   • Head ache    • HPV in female    • LGSIL of cervix of undetermined significance    • Low grade squamous intraepithelial lesion (LGSIL) on Papanicolaou smear of cervix     Resolved: 2014   • Mass of chin    • Mild dysplasia of cervix (ELIANA I)     Last Assessed: [id number]   • Mononucleosis     Resolved: 1992   • Normal delivery     2003 son, 2005 daughter   • Pyelonephritis    • UTI (urinary tract infection)      Past Surgical History:   Procedure Laterality Date   • BREAST BIOPSY Left 2017   • CERVICAL BIOPSY  W/ LOOP ELECTRODE EXCISION     • COLPOSCOPY W/ BIOPSY / CURETTAGE  2014, 2018    LSIL   • HYSTERECTOMY     • MAMMO STEREOTACTIC BREAST BIOPSY LEFT (ALL INC) Left 07/10/2017   • OK EXC SKIN BENIG 1 1-2 CM REMAINDR BODY Right 12/06/2017    Procedure: EXCISION NODULE CERVICAL REGION;  Surgeon: Faith Rush MD;  Location: [facility];  Service: General   • OK LAPAROSCOPY W TOT HYSTERECTUTERUS <=250 GRAM  W TUBE/OVARY N/A 05/27/2021    Procedure: ROBOTIC HYSTERECTOMY, BILATERAL SALPINGECTOMY, LEFT OOPHORECTOMY; ABLATION OF ENDOMETRIOSIS;  Surgeon: Keiko Higgins MD;  Location: [facility];  Service: Gynecology Oncology       Review of Systems   Respiratory: Negative  Cardiovascular: Negative  Gastrointestinal: Negative for constipation and diarrhea  Genitourinary: Negative for difficulty urinating, pelvic pain, vaginal bleeding, vaginal discharge, itching, or odor  O:  Blood pressure 110/74, height 5' 6 14" (1 68 m), weight 75 9 kg (167 lb 6 4 oz), last menstrual period 05/16/2021, not currently breastfeeding  Patient appears well and is not in distress  Breasts are symmetrical without mass, tenderness, nipple discharge, skin changes or adenopathy  Abdomen is soft and nontender without masses  External genitals are normal without lesions or rashes  Urethral meatus and urethra are normal  Vagina is normal without discharge or bleeding  Cervix and uterus are surgically absent  Adnexa have no masses, nontender     A:   Yearly exam, History of ELIANA -II       P:   Pap & HPV next year ( will need continued pap smears of cuff due to history)   Mammo slip provided   Colon cancer screening up to date     RTO one year for yearly exam or sooner as needed  Patient/Physician/Provider

## 2023-04-26 NOTE — CHART NOTE - NSCHARTNOTEFT_GEN_A_CORE
I have seen and examined the patient at 2210 hrs.  hypotensive during rapid response.  No abnormal complains.  'on exam patient is somnolent, responsive   abd is soft non tender devan no guarding, discomfort at RUQ but negative Muse signs.  Labs transaminases to thousand, U/S with cholelithiasis thickj wall c/w CHF.    At the current tome the patient does ot have cholecystitis, she has not had any abdominal complains.  elevation of transaminase could re result for right side CHF,  viral hepatitis needs to be ruled out.  Supportive respiratory care  Call with questions I have seen and examined the patient at 2210 hrs.  hypotensive during rapid response.  No abnormal complains.  'on exam patient is somnolent, responsive   abd is soft non tender devan no guarding, discomfort at RUQ but negative Muse signs.  Labs transaminases to thousand, U/S with cholelithiasis thickj wall c/w CHF.    At the current tome the patient does not have cholecystitis, she has not had any abdominal complains.  elevation of transaminase could re result for right side CHF,  viral hepatitis needs to be ruled out.  Supportive respiratory care  Call with questions

## 2023-04-26 NOTE — RAPID RESPONSE TEAM SUMMARY - NSOTHERINTERVENTIONSRRT_GEN_ALL_CORE
CBC, CMP, Blood Cultures x2, Procal, ABG  1L NS bolus    Patient started on Levophed and BIPAP and transferred to MICU

## 2023-04-26 NOTE — H&P ADULT - HISTORY OF PRESENT ILLNESS
pt. is a 72 y/o female with pmh of Afib HTN, sarcoid, DM, hypothyroidism, cardiomyopathy , chf , EF les than 20  % per family, s/p MVR mechanical,  ICD, presents c/o SOB and wheze x 2 days, coughing with yellow sputum. Her daughter is at bedside. Reports that Pt saw her cardiologist Dr. Barfield yesterday who said Pt may have "lung issue". Denies fevers, chills, headache, chest pain, abd. pain , palpitations, nausea, vomiting, diarrhea, hematuria, dysuria, dark stools, or focal neurologic symptoms. Daughter reports Pt's  recently got hMPV URI/ pneumonia and was at [facility]. pt. is hmpv positive today. Later in the ER pt. had Rapid response dur to tachypnea and hypotensive. pt. evaluated by MICU and going to micu , bipap started, pt. on levophed. pt. is a 72 y/o female with pmh of Afib HTN, sarcoid, DM ? per pt's daughter not on any meds as not Diabetic ? hypothyroidism, cardiomyopathy , chf , EF les than 20  % per family, s/p MVR mechanical,  ICD, presents c/o SOB and wheze x 2 days, coughing with yellow sputum. Her daughter is at bedside. Reports that Pt saw her cardiologist Dr. Barfield yesterday who said Pt may have "lung issue". Denies fevers, chills, headache, chest pain, abd. pain , palpitations, nausea, vomiting, diarrhea, hematuria, dysuria, dark stools, or focal neurologic symptoms. Daughter reports Pt's  recently got hMPV URI/ pneumonia and was at [facility]. pt. is hmpv positive today. Later in the ER pt. had Rapid response dur to tachypnea and hypotensive. pt. evaluated by MICU and going to micu , bipap started, pt. on levophed.

## 2023-04-26 NOTE — RAPID RESPONSE TEAM SUMMARY - NSSITUATIONBACKGROUNDRRT_GEN_ALL_CORE
72 y/o female with pmh of Afib HTN, sarcoid, DM ? per pt's daughter not on any meds as not Diabetic ? hypothyroidism, cardiomyopathy, CHF, EF les than 20% per family, s/p MVR mechanical, ICD, presents c/o SOB and wheeze x 2 days, coughing with yellow sputum. Patient found to be Hmpv positive.  Rapid response called for hypotension and respiratory distress. Patient complains of shortness of breath and difficulty breathing.

## 2023-04-26 NOTE — ED PROVIDER NOTE - SKIN NEGATIVE STATEMENT, MLM
Quality 110: Preventive Care And Screening: Influenza Immunization: Influenza Immunization not Administered because Patient Refused.
Quality 130: Documentation Of Current Medications In The Medical Record: Current Medications Documented
Detail Level: Detailed
no abrasions, no jaundice, no lesions, no pruritis, and no rashes.

## 2023-04-26 NOTE — H&P ADULT - NSICDXPASTMEDICALHX_GEN_ALL_CORE_FT
PAST MEDICAL HISTORY:  Cataract     Diabetes     Glaucoma     HLD (hyperlipidemia)     HTN (hypertension)     Hypothyroid     Mitral valve disease s/p MVR    Nonischemic cardiomyopathy EF <20%; DDDR ICD '99; gen change '03, BiV upgrade '08, gen change '2014    Paroxysmal atrial fibrillation     Sarcoid involving lungs, heart, ears and eyes    Systolic heart failure     VT (ventricular tachycardia) [facility] refused VT ablation

## 2023-04-26 NOTE — ED ADULT NURSE REASSESSMENT NOTE - NS ED NURSE REASSESS COMMENT FT1
PT WOB worsened, Pt hypotensive, wheezes and rails heard throughout all lung fields. Rapid response called and pt transported to critical care. Labs repeated, ABG drawn, PT medicated per orders.  PT pending higher level of care.
PT with increased WOB, expiratory wheezes, 95% on RA. MD Tom contacted and made aware. Stat ABG ordered. Respiratory called. Pending test
patient decompensated in A14, brought to ambulance, tacnhpneic, hypotensive with maps of 50 with rales/wheezing, Dr. Tom at bedside after rapid called, awaiting ICU consult, patient had additional labs done, neb placed as per Dr. Tom, family updated on plan of care

## 2023-04-26 NOTE — H&P ADULT - ASSESSMENT
pt. is a 72 y/o female with pmh of Afib HTN, sarcoid , hypothyroidism, cardiomyopathy , chf , EF les than 20  % per family, s/p MVR mechanical,  ICD, presents c/o SOB and wheze x 2 days, coughing with yellow sputum. Her daughter is at bedside.  Daughter reports Pt's  recently got hMPV URI/ pneumonia and was at [facility]. pt. is hmpv positive today. Later in the ER pt. had Rapid response dur to tachypnea and hypotensive. pt. evaluated by MICU and going to micu , bipap started, pt. on levophed.

## 2023-04-26 NOTE — CONSULT NOTE ADULT - ASSESSMENT
Impression/Plan:    cardiomyopathy  sepsis  A-fib  DM    Pt w hypotension and sob. Minimal fluid challenge by Med/ed staff.  1 L NS bolus being administered w response.  Pt started on levophed to maintain SBP >90.  solu-medrol 40mg IV q6h.  Bronchodilators.  Start heparin for ac for mechanical valve.  cardio eval.  serial CE.  Obtain TTE. Surgical eval for acute cholecystitis feels it is 2*2 cardiomyopathy.  Transaminitis likely due to same.  Will start Empiric abx. Discussed w pt's daughter DNR/DNI and she is not prepared at this time to make a decision.    Neuro: hold sedation    HEENT: clear    Pulmonary, supplemental O2, may try Bipap, possible intubation    Cardiovascular: start levophed, TTE, cardio eval, heparin for MV    GI: NPO    Endocrine: glucose monitoring and control    ID: empiric abx, broad spectrum    Hem: monitor hgb/plt    Renal: monitor UO/creat    Electrolytes: monitor k, Mg, PO4      Critical Care time spent: 35 minutes

## 2023-04-26 NOTE — ED ADULT NURSE NOTE - ED STAT RN HANDOFF DETAILS
handoff report given to Tyler, aware of need for heparin after weight, all other meds and interventions given and provided.

## 2023-04-26 NOTE — CONSULT NOTE ADULT - ASSESSMENT
ASSESSMENT: Patient is a 73yf pmhx CHF, sarcoidosis, afib, DM admitted for human metapneumovirus, RUQ US performed for transaminitis revealed distended gallbladder with cholelithiasis and upper limit normal wall thickening. Patient does not have any symptoms of cholecystitis at time of interview and upon discussion with patient's family, she has not had any symptoms of acute cholecystitis.    PLAN:    - No surgical intervention indicated   - Care per medicine  - Plan discussed with Attending, Dr. Devlin

## 2023-04-26 NOTE — CONSULT NOTE ADULT - SUBJECTIVE AND OBJECTIVE BOX
Patient is a 73y old  Female who presents with a chief complaint of sob    HPI: 74 yo female PMHx sig for a-fib-coumadin, HTN, sarcoid, DM, hypothyroid, cardiomyopathy s/p MVR, ICD. Was sent by her cardiologist who she saw yesterday,  for sob and "lung Issue"  Her  was recently treated hMVP. pt is now +hMVP.  Consult requested as pt became hypotensive.  She is having some labored breathing, still saturating 100%.      PAST MEDICAL & SURGICAL HISTORY:  Hypothyroid      Diabetes      HTN (hypertension)      Cataract      Mitral valve disease  s/p MVR      Sarcoid  involving lungs, heart, ears and eyes      Glaucoma      Paroxysmal atrial fibrillation      VT (ventricular tachycardia)  [facility] refused VT ablation      Nonischemic cardiomyopathy  EF <20%; DDDR ICD '99; gen change '03, BiV upgrade '08, gen change '2014      Systolic heart failure      HLD (hyperlipidemia)      [facility]      H[facility]O [facility] valve replacement      ICD (implantable cardioverter-defibrillator) in place  s/p DDDR ICD '99, gen change '03, BiV upgrade '08, gen change 2014        Allergies    No Known Allergies    Intolerances      FAMILY HISTORY:  No pertinent family history in first degree relatives      Home Medications:  amiodarone 200 mg oral tablet: 0.5 tab(s) orally once a day (in the evening) (30 Sep 2021 09:02)  atorvastatin 20 mg oral tablet: 1 tab(s) orally once a day (in the evening) (30 Sep 2021 09:02)  folic acid 1 mg oral tablet: 1 tab(s) orally once a day (30 Sep 2021 09:02)  Lasix 20 mg oral tablet: 1 tab(s) orally once a day, As Needed (30 Sep 2021 09:02)  levothyroxine 75 mcg (0.075 mg) oral tablet: 1 tab(s) orally once a day (30 Sep 2021 09:02)  metoprolol succinate 25 mg oral tablet, extended release: 0.5 tab(s) orally once a day (in the evening) (30 Sep 2021 09:02)  sacubitril-valsartan 49 mg-51 mg oral tablet: 1 tab(s) orally once a day (30 Sep 2021 09:02)  Verquvo 2.5 mg oral tablet: 1 tab(s) orally once a day (in the evening) (30 Sep 2021 09:02)  warfarin 1 mg oral tablet: 1 tab(s) orally once a day (in the evening) (30 Sep 2021 09:02)    Occupation:  Alochol: Denied  Smoking: Denied  Drug Use: Denied  Marital Status:         ROS: as in HPI; All other systems reviewed are negative    ICU Vital Signs Last 24 Hrs  T(C): 36.7 (26 Apr 2023 19:31), Max: 36.7 (26 Apr 2023 16:08)  T(F): 98 (26 Apr 2023 19:31), Max: 98 (26 Apr 2023 16:08)  HR: 82 (26 Apr 2023 22:46) (78 - 89)  BP: 85/56 (26 Apr 2023 22:46) (76/51 - 115/52)  BP(mean): --  ABP: --  ABP(mean): --  RR: 32 (26 Apr 2023 22:46) (16 - 32)  SpO2: 100% (26 Apr 2023 22:46) (95% - 100%)    O2 Parameters below as of 26 Apr 2023 22:40  Patient On (Oxygen Delivery Method): neb              Physical Examination:    General: No acute distress.  Alert, oriented, interactive, nonfocal    HEENT: Pupils equal, reactive to light.  Symmetric.    PULM: course sounds, tachypnic    CVS: Regular rate and rhythm, no murmurs, rubs, or gallops    ABD: Soft, nondistended, nontender, normoactive bowel sounds, no masses    EXT: No edema, nontender    SKIN: Warm and well perfused, no rashes noted.          ABG - ( 26 Apr 2023 22:06 )  pH, Arterial: 7.420 pH, Blood: x     /  pCO2: 32    /  pO2: 64    / HCO3: 21    / Base Excess: -3.7  /  SaO2: 94.4                I&O's Detail        LABS:                        10.6   11.98 )-----------( 102      ( 26 Apr 2023 14:37 )             32.7     26 Apr 2023 21:54    135    |  99     |  33.8   ----------------------------<  167    3.6     |  19.0   |  1.73     Ca    9.1        26 Apr 2023 21:54  Mg     2.3       26 Apr 2023 14:37    TPro  6.4    /  Alb  3.3    /  TBili  0.5    /  DBili  x      /  AST  x      /  ALT  x      /  AlkPhos  174    26 Apr 2023 21:54  Amylase x     lipase x          CARDIAC MARKERS ( 26 Apr 2023 14:37 )  x     / <0.01 ng/mL / x     / x     / x          CAPILLARY BLOOD GLUCOSE      POCT Blood Glucose.: 159 mg/dL (26 Apr 2023 21:56)    PT/INR - ( 26 Apr 2023 14:37 )   PT: 24.1 sec;   INR: 2.06 ratio         PTT - ( 26 Apr 2023 14:37 )  PTT:43.8 sec    Culture        MEDICATIONS  (STANDING):  warfarin 1 milliGRAM(s) Oral once    MEDICATIONS  (PRN):        RADIOLOGY: ***     CXR:    IMPRESSION: Stable pulmonary fibrotic changes.      RUQ sono    IMPRESSION:  Gallstones with additional findings equivocal for acute cholecystitis. A   HIDA scan is advised for further evaluation.

## 2023-04-26 NOTE — ED ADULT NURSE NOTE - NSICDXPASTMEDICALHX_GEN_ALL_CORE_FT
PAST MEDICAL HISTORY:  Cataract     Diabetes     Glaucoma     HLD (hyperlipidemia)     HTN (hypertension)     Hypothyroid     Mitral valve disease s/p MVR    Nonischemic cardiomyopathy EF <20%; DDDR ICD '99; gen change '03, BiV upgrade '08, gen change '2014    Paroxysmal atrial fibrillation     Sarcoid involving lungs, heart, ears and eyes    Systolic heart failure     VT (ventricular tachycardia) [facility] refused VT ablation

## 2023-04-26 NOTE — ED ADULT NURSE NOTE - OBJECTIVE STATEMENT
PT presents to ED from home for new onset wheezing and coughing. PT with hx of CHF.  Daughter states pts  was recently discharged from hospital for PNA and HMPV. PT not O2 dependent at home. arrived to ed on 2L NC for comfort.  IV placed labs drawn and medicated per orders. Pending results.

## 2023-04-26 NOTE — CONSULT NOTE ADULT - ATTENDING COMMENTS
I have seen and examined the patient at 2210 hrs.  hypotensive during rapid response.  No abnormal complains.  'on exam patient is somnolent, responsive   abd is soft non tender devan no guarding, discomfort at RUQ but negative Muse signs.  Labs transaminases to thousand, U/S with cholelithiasis thickj wall c/w CHF.    At the current tome the patient does not have cholecystitis, she has not had any abdominal complains.  elevation of transaminase could re result for right side CHF,  viral hepatitis needs to be ruled out.  Supportive respiratory care  Call with questions.

## 2023-04-27 NOTE — CONSULT NOTE ADULT - SUBJECTIVE AND OBJECTIVE BOX
Patient is a 73y old  Female who presents with a chief complaint of acute hypoxic respiratory failure (2023 03:27)      HPI:  Patient is a 74 y/o female with PMHx of Afib-coumadin, HTN, sarcoid, DM ? per pt's daughter not on any meds as not Diabetic ? hypothyroidism, cardiomyopathy , CHF, EF less than 20  % per family, s/p MVR mechanical,  ICD, presents c/o SOB and wheeze x 2 days, coughing with yellow sputum. Her daughter is at bedside. Reports that Pt saw her cardiologist Dr. Barfield yesterday who said Pt may have "lung issue" and advised them to come to ED. Patient was found to be +hMVP.  Pt's  recently got hMPV URI/ pneumonia and was at [employer]. Denies fevers, chills, headache, chest pain, abd. pain , palpitations, nausea, vomiting, diarrhea, hematuria, dysuria, dark stools, or focal neurologic symptoms. Later in the ER pt. had Rapid response due to tachypnea and hypotension. Transferred to MICU.   GI consulted for elevated LFTs. Patient seen and evaluated at bedside, reporting no complaints, no overnight events, Currently on bipap and levophed. Daughter at bedside and reports patient has been having ongoing issues with elevated liver enzymes due to Amiodarone medication requiring occasional dose changes. Denies hx of smoking or ETOH use. Denies any hx liver disease. Elevated LFTs with Alk Phos 185, AST/ALT 1370/1204, Tbili 0.7. US of RUQ on  showed Gallstones with additional findings equivocal for acute cholecystitis. A HIDA scan is advised for further evaluation. At this time, Denies nausea, vomiting, abdominal pain, fever, chills, chest pain, shortness of breath.      PAST MEDICAL & SURGICAL HISTORY:  Hypothyroid      Diabetes      HTN (hypertension)      Cataract      Mitral valve disease  s/p MVR      Sarcoid  involving lungs, heart, ears and eyes      Glaucoma      Paroxysmal atrial fibrillation      VT (ventricular tachycardia)  [facility] refused VT ablation      Nonischemic cardiomyopathy  EF <20%; DDDR ICD '; gen change '03, BiV upgrade '08, gen change '      Systolic heart failure      HLD (hyperlipidemia)      Elective surgery  back surgury      H/O mitral valve replacement      ICD (implantable cardioverter-defibrillator) in place  s/p DDDR ICD '99, gen change '03, BiV upgrade '08, gen change           Allergies    No Known Allergies    Intolerances        MEDICATIONS  (STANDING):  albuterol/ipratropium for Nebulization 3 milliLiter(s) Nebulizer every 6 hours  azithromycin  IVPB 500 milliGRAM(s) IV Intermittent every 24 hours  azithromycin  IVPB      cefepime  Injectable. 2000 milliGRAM(s) IV Push every 12 hours  cefepime  Injectable.      chlorhexidine 2% Cloths 1 Application(s) Topical <User Schedule>  dexMEDEtomidine Infusion 0.3 MICROgram(s)/kG/Hr (3.77 mL/Hr) IV Continuous <Continuous>  dextrose 5%. 1000 milliLiter(s) (50 mL/Hr) IV Continuous <Continuous>  dextrose 5%. 1000 milliLiter(s) (100 mL/Hr) IV Continuous <Continuous>  dextrose 50% Injectable 12.5 Gram(s) IV Push once  dextrose 50% Injectable 25 Gram(s) IV Push once  dextrose 50% Injectable 25 Gram(s) IV Push once  glucagon  Injectable 1 milliGRAM(s) IntraMuscular once  heparin  Infusion.  Unit(s)/Hr (9 mL/Hr) IV Continuous <Continuous>  insulin regular Infusion 8 Unit(s)/Hr (8 mL/Hr) IV Continuous <Continuous>  levothyroxine Injectable 36 MICROGram(s) IV Push at bedtime  methylPREDNISolone sodium succinate Injectable 40 milliGRAM(s) IV Push every 8 hours  norepinephrine Infusion 0.05 MICROgram(s)/kG/Min (4.72 mL/Hr) IV Continuous <Continuous>  potassium chloride  20 mEq/100 mL IVPB 20 milliEquivalent(s) IV Intermittent once  sodium phosphate 15 milliMole(s)/250 mL IVPB 15 milliMole(s) IV Intermittent once  vancomycin  IVPB 1000 milliGRAM(s) IV Intermittent every 24 hours  vancomycin  IVPB      vasopressin Infusion 0.04 Unit(s)/Min (6 mL/Hr) IV Continuous <Continuous>    MEDICATIONS  (PRN):  albuterol    0.083% 2.5 milliGRAM(s) Nebulizer every 4 hours PRN Shortness of Breath and/or Wheezing  dextrose Oral Gel 15 Gram(s) Oral once PRN Blood Glucose LESS THAN 70 milliGRAM(s)/deciliter  heparin   Injectable 2000 Unit(s) IV Push every 6 hours PRN For aPTT between 40 - 57  heparin   Injectable 4000 Unit(s) IV Push every 6 hours PRN For aPTT less than 40      Social History:    Marital Status:  (   )    (   ) Single    (   )    (  )   Occupation:   Lives with: (  ) alone  (  ) children   (  ) spouse   (  ) parents  (  ) other    Substance Use (street drugs): (  ) never used  (  ) other:  Tobacco Usage:  ( x ) never smoked   (   ) former smoker   (   ) current smoker  (     ) pack year  (        ) last cigarette date  Alcohol Usage: Denies   Sexual History:     Family History   IBD (  ) Yes   (  ) No  GI Malignancy (  )  Yes    (  ) No    Health Management     Last Colonoscopy -      (     ) Advanced Directives: (     ) None    (      ) DNR    (     ) DNI    (     ) Health Care Proxy:       REVIEW OF SYSTEMS:   General: Negative  HEENT: Negative  CV: Negative  Respiratory: Negative  GI: See HPI  : Negative  MSK: Negative  Hematologic: Negative  Skin: Negative      Vital Signs Last 24 Hrs  T(C): 36.1 (2023 07:29), Max: 36.7 (2023 16:08)  T(F): 97 (2023 07:29), Max: 98 (2023 16:08)  HR: 79 (2023 07:15) (77 - 92)  BP: 107/62 (2023 07:00) (61/35 - 141/109)  BP(mean): 75 (2023 07:00) (44 - 119)  RR: 19 (2023 07:15) (16 - 32)  SpO2: 100% (2023 07:15) (95% - 100%)    Parameters below as of 2023 01:42  Patient On (Oxygen Delivery Method): BiPAP/CPAP        PHYSICAL EXAM:   GENERAL:  On BiPap, No acute distress  HEENT:  NC/AT, conjunctiva clear, sclera anicteric  CHEST:  No increased effort, breath sounds clear  HEART:  Regular rhythm  ABDOMEN:  Soft, non-tender, non-distended, normoactive bowel sounds  EXTREMITIES: No edema  SKIN:  Warm, dry  NEURO:  Calm, cooperative        LABS:                        9.7    16.66 )-----------( 146      ( 2023 05:25 )             29.6     04-27    134<L>  |  99  |  34.1<H>  ----------------------------<  375<H>  3.5   |  19.0<L>  |  1.71<H>    Ca    8.8      2023 05:25  Phos  2.6       Mg     2.1         TPro  6.5<L>  /  Alb  3.4  /  TBili  0.7  /  DBili  x   /  AST  1370<H>  /  ALT  1204<H>  /  AlkPhos  185<H>      PT/INR - ( 2023 14:37 )   PT: 24.1 sec;   INR: 2.06 ratio         PTT - ( 2023 14:37 )  PTT:43.8 sec  Urinalysis Basic - ( 2023 22:40 )    Color: Yellow / Appearance: Clear / S.015 / pH: x  Gluc: x / Ketone: Negative  / Bili: Negative / Urobili: Negative mg/dL   Blood: x / Protein: Negative / Nitrite: Negative   Leuk Esterase: Negative / RBC: x / WBC x   Sq Epi: x / Non Sq Epi: x / Bacteria: x      LIVER FUNCTIONS - ( 2023 05:25 )  Alb: 3.4 g/dL / Pro: 6.5 g/dL / ALK PHOS: 185 U/L / ALT: 1204 U/L / AST: 1370 U/L / GGT: x             RADIOLOGY & ADDITIONAL TESTS:      < from: US Abdomen Upper Quadrant Right (23 @ 18:22) >    ACC: [id number] EXAM:  US ABDOMEN RT UPR QUADRANT   ORDERED BY: KRISTIAN INMAN     PROCEDURE DATE:  2023          INTERPRETATION:  CLINICAL INFORMATION: Hepatitis.    COMPARISON: 2/10/2022 outside exam    TECHNIQUE: Sonography of the right upper quadrant.    FINDINGS:  Liver: Within normal limits.  Bile ducts: Normal caliber. Common bile duct measures 6 mm.  Gallbladder: Distended with multiple stones with wall thickening of 4 to   5 mm. Sonographic Muse's sign is equivocal, in this patient pretreated   with analgesics.  Pancreas: Not well visualized.  Right kidney: 8.1 cm. No hydronephrosis. Cortical thinning and overall   atrophy.  Ascites: None.  IVC: Visualized portions are within normal limits.    IMPRESSION:  Gallstones with additional findings equivocal for acute cholecystitis. A   HIDA scan is advised for further evaluation.        --- End of Report ---            LUX BULL MD; Attending Radiologist  This document has been electronically signed. 2023  6:34PM    < end of copied text >

## 2023-04-27 NOTE — CONSULT NOTE ADULT - ASSESSMENT
Patient is a 73y old  Female with h/o severe MR, treated with mechanical MVR, HTN, IDDM, HLD, renal insufficiency,  severe nonischemic dilated cardiomyopathy with LVEF=15 %, chronic systolic CHF, paroxysmal VT, s/p DDD biventricular ICD implantation, paroxysmal atrial tachycardia, prior CVA off of anticoagulation, pulmonary sarcoidosis,  glaucoma, who presented on 4/26/2023 with a chief complaint of cough productive of yellowish , chills, dyspnea, chills, severe headaches, 3 pillow orthopnea x 1 to 2 days and was referred to ER during an office visti 4/26/2023 after patient was found to be hypotensive with BP of 80/60 mm Hg with diffuse inspiratory/expiratory wheezing and rhonchi bilaterally.  CXR 4/26/2023 stable chronic fibrotic changes, abdominal u/s 4/26/2023 gallstones can not rule out acute cholecystitis, elevated proBNP of 2,551, abnormal LFTs: AST 1370, ALT 1209, , Positive HMPV titers (Resp RVP).    Assessment/Recommendations:   1.  Admitted 4/26/2022 with acute on chronic respiratory failure 2ndary to viral pulmonary infection with HMPV (RepRVP) 2ndary to exposure to infected , complicated by sepsis with hypothermia and hypotension.    -Pressure support with Norepinephrine, vasopressin, Dexmedetomidine, Stress dose of steroids.  -pumlonary support with BIPAP mask therapy.  -admission to MICU.    2.  H/o severe, nonischemic dilated cardiomyopathy with LVEF=15-20 %-s/p mechanically MVR for severe MR and chronic systolic CHF-proBNP of 2,551-CXR 4/26/2023 demonstrates chronic fibrotic changes with no superimposed infiltrates or pleural effusions.      -Daily weights, cautious IV hydration, continue to follow BNP levels.    -Agree with holding IV diuresis, given severe hypotension.    3.  Chronic renal insufficiency-BUN/Cr 34.1/1.71 on 4/27/2023    4.  H/o VT-s/p DDD biventricular ICD implantation-normal DDD biventricular pacing on telemetry and 12 lead ECG.    5.  H/o paroxysmal atrial tachycardia-maintaining sinus rhythm.

## 2023-04-27 NOTE — CHART NOTE - NSCHARTNOTEFT_GEN_A_CORE
72 y/o F with a h/o sarcoidosis, NICM (LVEF < 20%), mechanical MVR (on warfarin), pAF, hypothyroid, recent exposure to  who has HMPV, presents to the ED with complaints of SOB, wheezing, and productive cough x 2 days. MICU eval requested as patient exhibited persistent dyspnea for several hours and began to develop hypotension (SBP 60s). She tested positive for HMPV.    I performed an unofficial bedside POCUS and her bilateral anterior lung fields were A-line predominant. Low suspicion for pulmonary edema/volume overload. Dilated LV with severely reduced function. She remains bronchospastic with bilateral wheezing on auscultation.     Of note, she had taken her Toprol XL and Entresto earlier today. Her daughter also reports her baseline SBP is in the 90s.    Suspected mixed distributive/cardiogenic shock state related to viral infection in the setting of advanced cardiomyopathy and long acting beta blockade and afterload reduction agent. Evidence of MSOF- DERIK and shock liver.    Challenged with 1L crystalloid with uptrend in SBP into the 70s. Would not pursue further volume resuscitation given propensity for volume overload. Started on norepinephrine infusion with goal to maintain SBP > 90. Will monitor clinical and laboratory end-points of perfusion closely moving forward.    Blood cultures sent. Obtain sputum culture as able. Send urine legionella antigen. Procalcitonin is mildly elevated. Abdominal US suggestive of possible acute cholecystitis, however her abdominal exam is benign and this can be explained by heart failure and ischemic liver insult. Surgery input appreciated. Start empiric cefepime, vancomycin, and azithromycin for now. Deescalate as able.    Start BiPAP, titrate to maintain SpO2 > 92% and adequate minute ventilation. ABG is acceptable. Low threshold to intubate given her clinical picture and high risk for further life threatening decompensation. This is in line with her goals of care as per her daughter at the bedside.    Start standing inhaled bronchodilators and IV steroids.    Replace warfarin with heparin infusion given inability to tolerate PO intake at this time.    Case discussed with MICU physician, Dr. Mitchell, and hospitalist, Dr. Tom.      CRITICAL CARE TIME SPENT: 58 mins  Time spent evaluating/treating patient with medical issues that pose a high risk for life threatening deterioration and/or end-organ damage, reviewing data/labs/imaging, discussing case with multidisciplinary team, discussing plan/goals of care with patient/family. Non-inclusive of procedure time. 74 y/o F with a h/o sarcoidosis, NICM (LVEF < 20%), mechanical MVR (on warfarin), pAF, hypothyroid, recent exposure to  who has HMPV, presents to the ED with complaints of SOB, wheezing, and productive cough x 2 days. MICU eval requested as patient exhibited persistent dyspnea for several hours and began to develop hypotension (SBP 60s). She tested positive for HMPV.    I performed an unofficial bedside POCUS and her bilateral anterior lung fields were A-line predominant. Low suspicion for pulmonary edema/volume overload. Dilated LV with severely reduced function. She remains bronchospastic with bilateral wheezing on auscultation.     Of note, she had taken her Toprol XL and Entresto earlier today. Her daughter also reports her baseline SBP is in the 90s.    Suspected mixed distributive/cardiogenic shock state related to viral infection in the setting of advanced cardiomyopathy and long acting beta blockade and afterload reduction agent. Evidence of MSOF- DERIK and shock liver.    Challenged with 1L crystalloid with uptrend in SBP into the 70s. Would not pursue further volume resuscitation given propensity for volume overload. Started on norepinephrine infusion with goal to maintain SBP > 90. Will monitor clinical and laboratory end-points of perfusion closely moving forward.    Blood cultures sent. Obtain sputum culture as able. Send urine legionella antigen. Procalcitonin is mildly elevated. Abdominal US suggestive of possible acute cholecystitis, however her abdominal exam is benign and this can be explained by heart failure and ischemic liver insult. Surgery input appreciated. Start empiric cefepime, vancomycin, and azithromycin for now. Deescalate as able.    Start BiPAP, titrate to maintain SpO2 > 92% and adequate minute ventilation. ABG is acceptable. Low threshold to intubate given her clinical picture and high risk for further life threatening decompensation. This is in line with her goals of care as per her daughter at the bedside.    Start standing inhaled bronchodilators and IV steroids.    Replace warfarin with heparin infusion given inability to tolerate PO intake at this time.    ***UPDATE*** 0315  Progressive shock state after arriving to MICU. Agitated and restless.  Fixed dose vasopressin added.  Dexmedetomidine infusion added.    I have updated the patient's daughter about her mother's decline at the bedside. She is aware of Tarsandra's grave critical illness and high risk for sudden cardiac arrest. She wishes for intubation/CPR if only absolutely necessary. Will continue current management.      Case discussed with MICU physician, Dr. Mitchell, and hospitalist, Dr. Tom.      CRITICAL CARE TIME SPENT: 75 mins  Time spent evaluating/treating patient with medical issues that pose a high risk for life threatening deterioration and/or end-organ damage, reviewing data/labs/imaging, discussing case with multidisciplinary team, discussing plan/goals of care with patient/family. Non-inclusive of procedure time.

## 2023-04-27 NOTE — CONSULT NOTE ADULT - ASSESSMENT
Patient is a 72 y/o female with PMHx of Afib-coumadin, HTN, sarcoid, DM ? per pt's daughter not on any meds as not Diabetic ? hypothyroidism, cardiomyopathy , CHF, EF less than 20  % per family, s/p MVR mechanical,  ICD, presents c/o SOB and wheeze x 2 days, coughing with yellow sputum. Patient was found to be +hMVP. Later in the ER pt, had Rapid response due to tachypnea and hypotension. Transferred to MICU.     Transaminitis: DDX Shock liver, DILI   Elevated LFTs with Alk Phos 185, AST/ALT 1370/1204, Tbili 0.7.   US of RUQ on 4/26 showed Gallstones with additional findings equivocal for acute cholecystitis. Surgery recs appreciated. No surgical interventions needed   Daughter reports issues with elevated liver enzymes due to Amiodarone medication requiring occasional dose changes.   Denies hx of smoking or ETOH use. Denies any hx liver disease.    - Will order acute viral hepatitis panel   - Avoid Hepatotoxic drugs   - Avoid hypotension   - Continue to trend LFTs   - Rest of care as per primary team

## 2023-04-27 NOTE — CONSULT NOTE ADULT - NS ATTEND AMEND GEN_ALL_CORE FT
I evaluated this pt. with my ACP and agree with the above assessment and management plan. Pt with probable ischemic hepatopathy secondary to yesterday's hypotensive events and rapid response. Pt. seen with daughter at the bedside who provided much of the history. Pt. on BIPAP. Will order hepatitis and chronic liver disease serologies although pt. most likely has ischemic hepatopathy. Trend LFT's daily. Normal appearing liver on RUQ U/S. I evaluated this pt. with my ACP and agree with the above assessment and management plan. Pt with probable ischemic hepatopathy secondary to yesterday's hypotensive events and rapid response. Pt. seen with daughter at the bedside who provided much of the history. Pt. on BIPAP. Will order hepatitis and chronic liver disease serologies although pt. most likely has ischemic hepatopathy. Trend LFT's daily. Normal appearing liver on RUQ U/S. Pt with significant cardiac history including CHF and cardiomyopathy with low EF on Entresto s/p AICD with PPM. Also on Norepinephrine here. I evaluated this pt. with my ACP and agree with the above assessment and management plan. Pt with probable ischemic hepatopathy secondary to yesterday's hypotensive events and rapid response. Pt. seen with daughter at the bedside who provided much of the history. Pt. on BIPAP. Will order hepatitis and chronic liver disease serologies although pt. most likely has ischemic hepatopathy. Trend LFT's daily. Normal appearing liver on RUQ U/S. Pt with significant cardiac history including CHF and cardiomyopathy with low EF on Entresto s/p AICD with PPM. Also on Norepinephrine here. Pt. was on Amiodarone at home for atrial fibrillation which further complicates her abnormal LAES. Hold Amiodarone and all hepatotoxic drugs.

## 2023-04-27 NOTE — CONSULT NOTE ADULT - SUBJECTIVE AND OBJECTIVE BOX
Patient is a 73y old  Female with h/o severe MR, treated with mechanical MVR, HTN, IDDM, HLD, renal insufficiency,  severe nonischemic dilated cardiomyopathy with LVEF=15 %, chronic systolic CHF, paroxysmal VT, s/p DDD biventricular ICD implantation, paroxysmal atrial tachycardia, prior CVA off of anticoagulation, pulmonary sarcoidosis,  glaucoma, who presented on 2023 with a chief complaint of cough productive of yellowish , chills, dyspnea, chills, severe headaches, 3 pillow orthopnea x 1 to 2 days and was referred to ER during an office visti 2023 after patient was found to be hypotensive with BP of 80/60 mm Hg with diffuse inspiratory/expiratory wheezing and rhonchi bilaterally.  .      PAST MEDICAL & SURGICAL HISTORY:  Hypothyroid      Diabetes      HTN (hypertension)      Cataract      Mitral valve disease  s/p MVR      Sarcoid  involving lungs, heart, ears and eyes      Glaucoma      Paroxysmal atrial fibrillation      VT (ventricular tachycardia)  [facility] refused VT ablation      Nonischemic cardiomyopathy  EF <20%; DDDR ICD '99; gen change , BiV upgrade , gen change       Systolic heart failure      HLD (hyperlipidemia)      [facility]      ICD (implantable cardioverter-defibrillator) in place  s/p DDDR ICD '99, gen change , BiV upgrade , gen change           HPI:                PREVIOUS DIAGNOSTIC TESTING:      MEDICATIONS  (STANDING):  albuterol/ipratropium for Nebulization 3 milliLiter(s) Nebulizer every 6 hours  azithromycin  IVPB 500 milliGRAM(s) IV Intermittent every 24 hours  azithromycin  IVPB      cefepime  Injectable. 1000 milliGRAM(s) IV Push every 12 hours  chlorhexidine 2% Cloths 1 Application(s) Topical <User Schedule>  dexMEDEtomidine Infusion 0.3 MICROgram(s)/kG/Hr (3.77 mL/Hr) IV Continuous <Continuous>  dextrose 5%. 1000 milliLiter(s) (50 mL/Hr) IV Continuous <Continuous>  dextrose 5%. 1000 milliLiter(s) (100 mL/Hr) IV Continuous <Continuous>  dextrose 50% Injectable 12.5 Gram(s) IV Push once  dextrose 50% Injectable 25 Gram(s) IV Push once  dextrose 50% Injectable 25 Gram(s) IV Push once  glucagon  Injectable 1 milliGRAM(s) IntraMuscular once  heparin  Infusion.  Unit(s)/Hr (9 mL/Hr) IV Continuous <Continuous>  insulin regular Infusion 8 Unit(s)/Hr (8 mL/Hr) IV Continuous <Continuous>  levothyroxine Injectable 36 MICROGram(s) IV Push at bedtime  methylPREDNISolone sodium succinate Injectable 40 milliGRAM(s) IV Push every 8 hours  norepinephrine Infusion 0.05 MICROgram(s)/kG/Min (4.72 mL/Hr) IV Continuous <Continuous>  vasopressin Infusion 0.04 Unit(s)/Min (6 mL/Hr) IV Continuous <Continuous>    MEDICATIONS  (PRN):  albuterol    0.083% 2.5 milliGRAM(s) Nebulizer every 4 hours PRN Shortness of Breath and/or Wheezing  dextrose Oral Gel 15 Gram(s) Oral once PRN Blood Glucose LESS THAN 70 milliGRAM(s)/deciliter  heparin   Injectable 2000 Unit(s) IV Push every 6 hours PRN For aPTT between 40 - 57  heparin   Injectable 4000 Unit(s) IV Push every 6 hours PRN For aPTT less than 40      FAMILY HISTORY:  No pertinent family history in first degree relatives        REVIEW OF SYSTEMS:    2 days of generalized weakness, nasal congestion/drip, productive cough, chills, headaches x 2 days, 3 pillow orthopnea x 1 day, chronic lower extremity swelling, exertional dyspnea, intermittent dizziness.     Vital Signs Last 24 Hrs  T(C): 35.9 (2023 10:15), Max: 36.7 (2023 16:08)  T(F): 96.6 (2023 10:15), Max: 98 (2023 16:08)  HR: 80 (2023 10:45) (75 - 92)  BP: 100/60 (2023 10:00) (61/35 - 141/109)  BP(mean): 73 (2023 10:00) (44 - 119)  RR: 18 (2023 10:45) (16 - 32)  SpO2: 100% (2023 10:45) (95% - 100%)    Parameters below as of 2023 10:15  Patient On (Oxygen Delivery Method): BiPAP/CPAP    O2 Concentration (%): 40    PHYSICAL EXAM:  Sedated on BIPAP.    GENERAL: In no apparent distress,  and hydrated.  HEAD:  Atraumatic, Normocephalic  EYES: EOMI, PERRLA, conjunctiva and sclera clear  ENMT: No tonsillar erythema, exudates, or enlargement; Moist mucous membranes, Good dentition, No lesions  NECK: Supple and normal thyroid.  No JVD or carotid bruit.  Carotid pulse is 2+ bilaterally.  HEART: Regular rate and rhythm; No murmurs, rubs, or gallops.  PULMONARY: Diffuse anterior inspiratory/expiratory wheezing, rhonchi  throughout lung fields bilaterally.    ABDOMEN: Soft, Nontender, Nondistended; Bowel sounds present  EXTREMITIES:  1-2+ nonpitting lower extremity  edema  NEUROLOGICAL: Sedated          INTERPRETATION OF TELEMETRY:  Normal atrial sensing and ventricular paced complex.    ECG:    I&O's Detail    2023 07:  -  2023 07:00  --------------------------------------------------------  IN:    Dexmedetomidine: 26 mL    Heparin Infusion: 36 mL    Vasopressin: 24 mL  Total IN: 86 mL    OUT:    Indwelling Catheter - Urethral (mL): 450 mL  Total OUT: 450 mL    Total NET: -364 mL      2023 07:01  -  2023 11:32  --------------------------------------------------------  IN:    Dexmedetomidine: 35 mL    Heparin Infusion: 26 mL    Insulin: 15 mL    IV PiggyBack: 100 mL    IV PiggyBack: 125 mL    Norepinephrine: 100 mL    Vasopressin: 24 mL  Total IN: 425 mL    OUT:    Indwelling Catheter - Urethral (mL): 430 mL  Total OUT: 430 mL    Total NET: -5 mL          LABS:                        9.5    15.06 )-----------( 131      ( 2023 09:34 )             28.4     04    134<L>  |  99  |  34.1<H>  ----------------------------<  375<H>  3.5   |  19.0<L>  |  1.71<H>    Ca    8.8      2023 05:25  Phos  2.6       Mg     2.1         TPro  6.5<L>  /  Alb  3.4  /  TBili  0.7  /  DBili  x   /  AST  1370<H>  /  ALT  1204<H>  /  AlkPhos  185<H>      CARDIAC MARKERS ( 2023 14:37 )  x     / <0.01 ng/mL / x     / x     / x          PT/INR - ( 2023 14:37 )   PT: 24.1 sec;   INR: 2.06 ratio         PTT - ( 2023 09:08 )  PTT:>200.0 sec  Urinalysis Basic - ( 2023 22:40 )    Color: Yellow / Appearance: Clear / S.015 / pH: x  Gluc: x / Ketone: Negative  / Bili: Negative / Urobili: Negative mg/dL   Blood: x / Protein: Negative / Nitrite: Negative   Leuk Esterase: Negative / RBC: x / WBC x   Sq Epi: x / Non Sq Epi: x / Bacteria: x      BNP  I&O's Detail    2023 07:  -  2023 07:00  --------------------------------------------------------  IN:    Dexmedetomidine: 26 mL    Heparin Infusion: 36 mL    Vasopressin: 24 mL  Total IN: 86 mL    OUT:    Indwelling Catheter - Urethral (mL): 450 mL  Total OUT: 450 mL    Total NET: -364 mL      2023 07:01  -  2023 11:32  --------------------------------------------------------  IN:    Dexmedetomidine: 35 mL    Heparin Infusion: 26 mL    Insulin: 15 mL    IV PiggyBack: 100 mL    IV PiggyBack: 125 mL    Norepinephrine: 100 mL    Vasopressin: 24 mL  Total IN: 425 mL    OUT:    Indwelling Catheter - Urethral (mL): 430 mL  Total OUT: 430 mL    Total NET: -5 mL        Daily Height in cm: 149.86 (2023 02:30)    Daily     RADIOLOGY & ADDITIONAL STUDIES:

## 2023-04-27 NOTE — PROGRESS NOTE ADULT - SUBJECTIVE AND OBJECTIVE BOX
74 y/o F with a h/o sarcoidosis, NICM (LVEF < 20%), mechanical MVR (on warfarin), pAF, hypothyroid, recent exposure to  who has HMPV, presents to the ED with complaints of SOB, wheezing, and productive cough x 2 days. MICU eval requested as patient exhibited persistent dyspnea for several hours and began to develop hypotension (SBP 60s). She tested positive for HMPV.    I performed an unofficial bedside POCUS and her bilateral anterior lung fields were A-line predominant. Low suspicion for pulmonary edema/volume overload. Dilated LV with severely reduced function. She remains bronchospastic with bilateral wheezing on auscultation.     Of note, she had taken her Toprol XL and Entresto earlier today. Her daughter also reports her baseline SBP is in the 90s.    Suspected mixed distributive/cardiogenic shock state related to viral infection in the setting of advanced cardiomyopathy and long acting beta blockade and afterload reduction agent. Evidence of MSOF- DERIK and shock liver.    Challenged with 1L crystalloid with uptrend in SBP into the 70s. Would not pursue further volume resuscitation given propensity for volume overload. Started on norepinephrine infusion with goal to maintain SBP > 90. Will monitor clinical and laboratory end-points of perfusion closely moving forward.    Blood cultures sent. Obtain sputum culture as able. Send urine legionella antigen. Procalcitonin is mildly elevated. Abdominal US suggestive of possible acute cholecystitis, however her abdominal exam is benign and this can be explained by heart failure and ischemic liver insult. Surgery input appreciated. Start empiric cefepime, vancomycin, and azithromycin for now. Deescalate as able.    Start BiPAP, titrate to maintain SpO2 > 92% and adequate minute ventilation. ABG is acceptable. Low threshold to intubate given her clinical picture and high risk for further life threatening decompensation. This is in line with her goals of care as per her daughter at the bedside.    Start standing inhaled bronchodilators and IV steroids.    Replace warfarin with heparin infusion given inability to tolerate PO intake at this time.    ***UPDATE*** 0315  Progressive shock state after arriving to MICU. Agitated and restless.  Fixed dose vasopressin added.  Dexmedetomidine infusion added.    I have updated the patient's daughter about her mother's decline at the bedside. She is aware of Tarsandra's grave critical illness and high risk for sudden cardiac arrest. She wishes for intubation/CPR if only absolutely necessary. Will continue current management.      Case discussed with MICU physician, Dr. Mitchell, and hospitalist, Dr. Tom.      CRITICAL CARE TIME SPENT: 75 mins  Time spent evaluating/treating patient with medical issues that pose a high risk for life threatening deterioration and/or end-organ damage, reviewing data/labs/imaging, discussing case with multidisciplinary team, discussing plan/goals of care with patient/family. Non-inclusive of procedure time.

## 2023-04-27 NOTE — CONSULT NOTE ADULT - TIME BILLING
Labs and RUQ U/S and chart reviewed. Case and management d/w my ACP and the pt's daughter and MICU team. Orders in for Hepatitis and chronic liver disease serologies. Repeat CMP for the AM. Labs and RUQ U/S and chart reviewed. Case and management d/w my ACP and the pt's daughter and MICU team. Orders in for Hepatitis and chronic liver disease serologies. Repeat CMP for the AM. Medications also reviewed.

## 2023-04-27 NOTE — PATIENT PROFILE ADULT - FALL HARM RISK - HARM RISK INTERVENTIONS

## 2023-04-27 NOTE — CONSULT NOTE ADULT - CRITICAL CARE ATTENDING COMMENT
Patient's chart reviewed,  physical exam performed, all telemetry strips reviewed, case discussed with nurse, and ER attending.

## 2023-04-28 NOTE — PROGRESS NOTE ADULT - SUBJECTIVE AND OBJECTIVE BOX
Chief Complaint:  Patient is a 73y old  Female who presents with a chief complaint of acute hypoxic respiratory failure (2023 00:22)      HPI/ 24 hr events: Patient seen and examined at bedside, Remains dependent on dual IV vasopressors and BiPAP. Appears restless. On insulin infusion for hyperglycemia. Leukocytosis with WBC 25K, Hemoglobin 7.9gm, LFTs slightly trending down with Alk Phos 171, AST/ALT 1102/1120, Tbili 0.9.     REVIEW OF SYSTEMS:   Unable to obtain due to current resp status       MEDICATIONS:   MEDICATIONS  (STANDING):  albuterol/ipratropium for Nebulization 3 milliLiter(s) Nebulizer every 6 hours  azithromycin  IVPB 500 milliGRAM(s) IV Intermittent every 24 hours  azithromycin  IVPB      cefepime  Injectable. 1000 milliGRAM(s) IV Push every 12 hours  chlorhexidine 2% Cloths 1 Application(s) Topical <User Schedule>  dexMEDEtomidine Infusion 0.3 MICROgram(s)/kG/Hr (3.77 mL/Hr) IV Continuous <Continuous>  dextrose 5%. 1000 milliLiter(s) (100 mL/Hr) IV Continuous <Continuous>  dextrose 5%. 1000 milliLiter(s) (50 mL/Hr) IV Continuous <Continuous>  dextrose 50% Injectable 25 Gram(s) IV Push once  dextrose 50% Injectable 12.5 Gram(s) IV Push once  dextrose 50% Injectable 25 Gram(s) IV Push once  dorzolamide 2% Ophthalmic Solution 1 Drop(s) Both EYES three times a day  glucagon  Injectable 1 milliGRAM(s) IntraMuscular once  heparin  Infusion.  Unit(s)/Hr (9 mL/Hr) IV Continuous <Continuous>  insulin glargine Injectable (LANTUS) 10 Unit(s) SubCutaneous at bedtime  insulin lispro (ADMELOG) corrective regimen sliding scale   SubCutaneous every 6 hours  latanoprost 0.005% Ophthalmic Solution 1 Drop(s) Both EYES at bedtime  levothyroxine Injectable 36 MICROGram(s) IV Push at bedtime  methylPREDNISolone sodium succinate Injectable 40 milliGRAM(s) IV Push every 8 hours  norepinephrine Infusion 0.05 MICROgram(s)/kG/Min (4.72 mL/Hr) IV Continuous <Continuous>  vancomycin  IVPB 1000 milliGRAM(s) IV Intermittent <User Schedule>  vasopressin Infusion 0.04 Unit(s)/Min (6 mL/Hr) IV Continuous <Continuous>    MEDICATIONS  (PRN):  albuterol    0.083% 2.5 milliGRAM(s) Nebulizer every 4 hours PRN Shortness of Breath and/or Wheezing  dextrose Oral Gel 15 Gram(s) Oral once PRN Blood Glucose LESS THAN 70 milliGRAM(s)/deciliter  heparin   Injectable 2000 Unit(s) IV Push every 6 hours PRN For aPTT between 40 - 57  heparin   Injectable 4000 Unit(s) IV Push every 6 hours PRN For aPTT less than 40      ALLERGIES:   Allergies    No Known Allergies    Intolerances        VITAL SIGNS:   Vital Signs Last 24 Hrs  T(C): 36.6 (2023 04:00), Max: 36.6 (2023 03:00)  T(F): 97.9 (2023 04:00), Max: 97.9 (2023 03:00)  HR: 86 (2023 07:00) (75 - 94)  BP: 91/55 (2023 06:00) (88/57 - 114/72)  BP(mean): 68 (2023 06:00) (57 - 83)  RR: 25 (2023 07:00) (16 - 35)  SpO2: 100% (2023 07:00) (89% - 100%)    Parameters below as of 2023 04:00  Patient On (Oxygen Delivery Method): BiPAP/CPAP    O2 Concentration (%): 40  I&O's Summary    2023 07:01  -  2023 07:00  --------------------------------------------------------  IN: 1872.7 mL / OUT: 2055 mL / NET: -182.3 mL        PHYSICAL EXAM:   GENERAL: BiPap in place, No acute distress  HEENT:  NC/AT, conjunctiva clear, sclera anicteric  CHEST:  Tachypnea, increased effort  HEART:  Regular rhythm  ABDOMEN:  Soft, non-tender, non-distended, normoactive bowel sounds  EXTREMITIES: No edema  SKIN:  Warm, dry  NEURO:  lightly sedated, follows commands      LABS:  CBC Full  -  ( 2023 03:32 )  WBC Count : 25.95 K/uL  RBC Count : 2.41 M/uL  Hemoglobin : 7.9 g/dL  Hematocrit : 23.1 %  Platelet Count - Automated : 137 K/uL  Mean Cell Volume : 95.9 fl  Mean Cell Hemoglobin : 32.8 pg  Mean Cell Hemoglobin Concentration : 34.2 gm/dL  Auto Neutrophil # : 24.60 K/uL  Auto Lymphocyte # : 0.67 K/uL  Auto Monocyte # : 0.67 K/uL  Auto Eosinophil # : 0.00 K/uL  Auto Basophil # : 0.00 K/uL  Auto Neutrophil % : 92.2 %  Auto Lymphocyte % : 2.6 %  Auto Monocyte % : 2.6 %  Auto Eosinophil % : 0.0 %  Auto Basophil % : 0.0 %        137  |  103  |  28.4<H>  ----------------------------<  205<H>  4.1   |  19.0<L>  |  1.24    Ca    8.8      2023 03:32  Phos  3.8       Mg     1.8         TPro  5.9<L>  /  Alb  3.1<L>  /  TBili  0.9  /  DBili  x   /  AST  1102<H>  /  ALT  1120<H>  /  AlkPhos  171<H>      LIVER FUNCTIONS - ( 2023 03:32 )  Alb: 3.1 g/dL / Pro: 5.9 g/dL / ALK PHOS: 171 U/L / ALT: 1120 U/L / AST: 1102 U/L / GGT: x           PT/INR - ( 2023 03:32 )   PT: 29.9 sec;   INR: 2.55 ratio         PTT - ( 2023 01:53 )  PTT:>200.0 sec  Urinalysis Basic - ( 2023 22:40 )    Color: Yellow / Appearance: Clear / S.015 / pH: x  Gluc: x / Ketone: Negative  / Bili: Negative / Urobili: Negative mg/dL   Blood: x / Protein: Negative / Nitrite: Negative   Leuk Esterase: Negative / RBC: x / WBC x   Sq Epi: x / Non Sq Epi: x / Bacteria: x        Culture - Blood (collected 2023 22:10)  Source: .Blood Blood  Preliminary Report (2023 03:02):    No growth to date.    Culture - Blood (collected 2023 22:09)  Source: .Blood Blood  Preliminary Report (2023 03:02):    No growth to date.              Hepatitis A IgM Antibody: Nonreact (23 @ 19:10)  Hepatitis B Core IgM Antibody: Nonreact (23 @ 19:10)  Hepatitis B Surface Antigen: Nonreact (23 @ 19:10)  Hepatitis C Virus S/CO Ratio: 0.09 S/CO (23 @ 19:10)      RADIOLOGY & ADDITIONAL STUDIES:      -- -- --   ACC: [id number] EXAM:  US ABDOMEN RT UPR QUADRANT   ORDERED BY: KRISTIAN INMAN     PROCEDURE DATE:  2023          INTERPRETATION:  CLINICAL INFORMATION: Hepatitis.    COMPARISON: 2/10/2022 outside exam    TECHNIQUE: Sonography of the right upper quadrant.    FINDINGS:  Liver: Within normal limits.  Bile ducts: Normal caliber. Common bile duct measures 6 mm.  Gallbladder: Distended with multiple stones with wall thickening of 4 to   5 mm. Sonographic Muse's sign is equivocal, in this patient pretreated   with analgesics.  Pancreas: Not well visualized.  Right kidney: 8.1 cm. No hydronephrosis. Cortical thinning and overall   atrophy.  Ascites: None.  IVC: Visualized portions are within normal limits.    IMPRESSION:  Gallstones with additional findings equivocal for acute cholecystitis. A   HIDA scan is advised for further evaluation.        --- End of Report ---

## 2023-04-28 NOTE — DIETITIAN INITIAL EVALUATION ADULT - OTHER INFO
73 year old female with PMH of sarcoidosis, NICM (LVEF < 20%), mechanical MVR (on warfarin), pAF, hypothyroid, recent exposure to  who has HMPV, admitted with mixed septic/cardiogenic shock, acute hypoxemic respiratory failure, HMPV, DERIK, shock liver, hyperglycemia, metabolic encephalopathy.    Pt on BiPAP, unable to obtain nutrition hx at this time. Currently NPO. RD to follow up with subjective interview/diet education as feasible/appropriate.

## 2023-04-28 NOTE — PROGRESS NOTE ADULT - SUBJECTIVE AND OBJECTIVE BOX
Patient is a 73y old  Female who presents with a chief complaint of acute hypoxic respiratory failure (2023 11:26)      BRIEF HOSPITAL COURSE: 74 y/o F with a h/o sarcoidosis, NICM (LVEF < 20%), mechanical MVR (on warfarin), pAF, hypothyroid, recent exposure to  who has HMPV, admitted on  with complaints of SOB, wheezing, and productive cough x 2 days. MICU eval requested as patient exhibited persistent dyspnea for several hours and began to develop hypotension (SBP 60s). She tested positive for HMPV. Degenerated into mixed septic/cardiogenic shock state with MSOF requiring dual IV vasopressor therapy. Started on NIPPV for work of breathing.    Events last 24 hours: Remains dependent on dual IV vasopressors and BiPAP. Transiently on insulin infusion for hyperglycemia. Lightly sedated on dexmedetomidine gtt.        PAST MEDICAL & SURGICAL HISTORY:  Hypothyroid      Diabetes      HTN (hypertension)      Cataract      Mitral valve disease  s/p MVR      Sarcoid  involving lungs, heart, ears and eyes      Glaucoma      Paroxysmal atrial fibrillation      VT (ventricular tachycardia)  [facility] refused VT ablation      Nonischemic cardiomyopathy  EF <20%; DDDR ICD '99; gen change , BiV upgrade , gen change       Systolic heart failure      HLD (hyperlipidemia)      Elective surgery  back surgury      H/O mitral valve replacement      ICD (implantable cardioverter-defibrillator) in place  s/p DDDR ICD '99, gen change , BiV upgrade , gen change           Review of Systems:  CONSTITUTIONAL: No fever, chills, or fatigue  EYES: No eye pain, visual disturbances, or discharge  ENMT:  No difficulty hearing, tinnitus, vertigo; No sinus or throat pain  NECK: No pain or stiffness  RESPIRATORY: No cough, wheezing, chills or hemoptysis; No shortness of breath  CARDIOVASCULAR: No chest pain, palpitations, dizziness, or leg swelling  GASTROINTESTINAL: No abdominal or epigastric pain. No nausea, vomiting, or hematemesis; No diarrhea or constipation. No melena or hematochezia.  GENITOURINARY: No dysuria, frequency, hematuria, or incontinence  NEUROLOGICAL: No headaches, memory loss, loss of strength, numbness, or tremors  SKIN: No itching, burning, rashes, or lesions   MUSCULOSKELETAL: No joint pain or swelling; No muscle, back, or extremity pain  PSYCHIATRIC: No depression, anxiety, mood swings, or difficulty sleeping      Medications:  azithromycin  IVPB 500 milliGRAM(s) IV Intermittent every 24 hours  azithromycin  IVPB      cefepime  Injectable. 1000 milliGRAM(s) IV Push every 12 hours  vancomycin  IVPB 1000 milliGRAM(s) IV Intermittent <User Schedule>  norepinephrine Infusion 0.05 MICROgram(s)/kG/Min IV Continuous <Continuous>  albuterol    0.083% 2.5 milliGRAM(s) Nebulizer every 4 hours PRN  albuterol/ipratropium for Nebulization 3 milliLiter(s) Nebulizer every 6 hours  dexMEDEtomidine Infusion 0.3 MICROgram(s)/kG/Hr IV Continuous <Continuous>  heparin   Injectable 2000 Unit(s) IV Push every 6 hours PRN  heparin   Injectable 4000 Unit(s) IV Push every 6 hours PRN  heparin  Infusion.  Unit(s)/Hr IV Continuous <Continuous>  dextrose 50% Injectable 25 Gram(s) IV Push once  dextrose 50% Injectable 25 Gram(s) IV Push once  dextrose 50% Injectable 12.5 Gram(s) IV Push once  dextrose Oral Gel 15 Gram(s) Oral once PRN  glucagon  Injectable 1 milliGRAM(s) IntraMuscular once  insulin glargine Injectable (LANTUS) 10 Unit(s) SubCutaneous at bedtime  insulin lispro (ADMELOG) corrective regimen sliding scale   SubCutaneous every 6 hours  levothyroxine Injectable 36 MICROGram(s) IV Push at bedtime  methylPREDNISolone sodium succinate Injectable 40 milliGRAM(s) IV Push every 8 hours  vasopressin Infusion 0.04 Unit(s)/Min IV Continuous <Continuous>  dextrose 5%. 1000 milliLiter(s) IV Continuous <Continuous>  dextrose 5%. 1000 milliLiter(s) IV Continuous <Continuous>  chlorhexidine 2% Cloths 1 Application(s) Topical <User Schedule>  dorzolamide 2% Ophthalmic Solution 1 Drop(s) Both EYES three times a day  latanoprost 0.005% Ophthalmic Solution 1 Drop(s) Both EYES at bedtime            ICU Vital Signs Last 24 Hrs  T(C): 36.4 (2023 00:00), Max: 36.6 (2023 04:00)  T(F): 97.5 (2023 00:00), Max: 97.9 (2023 04:00)  HR: 87 (2023 00:00) (75 - 94)  BP: 95/63 (2023 00:00) (61/35 - 141/109)  BP(mean): 73 (2023 00:00) (44 - 119)  ABP: 99/48 (2023 00:00) (77/74 - 135/52)  ABP(mean): 62 (2023 00:00) (45 - 77)  RR: 23 (2023 00:00) (16 - 35)  SpO2: 97% (2023 00:00) (89% - 100%)    O2 Parameters below as of 2023 00:00  Patient On (Oxygen Delivery Method): BiPAP/CPAP    O2 Concentration (%): 40        ABG - ( 2023 20:28 )  pH, Arterial: 7.450 pH, Blood: x     /  pCO2: 26    /  pO2: 66    / HCO3: 18    / Base Excess: -5.9  /  SaO2: 97.4                I&O's Detail    2023 07:01  -  2023 07:00  --------------------------------------------------------  IN:    Dexmedetomidine: 26 mL    Heparin Infusion: 36 mL    Vasopressin: 24 mL  Total IN: 86 mL    OUT:    Indwelling Catheter - Urethral (mL): 450 mL  Total OUT: 450 mL    Total NET: -364 mL      2023 07:01  -  2023 00:23  --------------------------------------------------------  IN:    Dexmedetomidine: 221.6 mL    Heparin Infusion: 116 mL    Insulin: 35 mL    IV PiggyBack: 250 mL    IV PiggyBack: 200 mL    IV PiggyBack: 250 mL    Norepinephrine: 316 mL    Norepinephrine: 94.8 mL    Vasopressin: 102 mL  Total IN: 1585.4 mL    OUT:    Indwelling Catheter - Urethral (mL): 1445 mL  Total OUT: 1445 mL    Total NET: 140.4 mL            LABS:                        9.5    15.06 )-----------( 131      ( 2023 09:34 )             28.4         137  |  102  |  30.3<H>  ----------------------------<  108<H>  3.4<L>   |  20.0<L>  |  1.44<H>    Ca    9.1      2023 16:34  Phos  3.4       Mg     1.9         TPro  6.4<L>  /  Alb  3.3  /  TBili  0.5  /  DBili  0.3  /  AST  1395<H>  /  ALT  1256<H>  /  AlkPhos  181<H>        CARDIAC MARKERS ( 2023 14:37 )  x     / <0.01 ng/mL / x     / x     / x          CAPILLARY BLOOD GLUCOSE      POCT Blood Glucose.: 120 mg/dL (2023 21:07)    PT/INR - ( 2023 14:37 )   PT: 24.1 sec;   INR: 2.06 ratio         PTT - ( 2023 16:34 )  PTT:>200.0 sec  Urinalysis Basic - ( 2023 22:40 )    Color: Yellow / Appearance: Clear / S.015 / pH: x  Gluc: x / Ketone: Negative  / Bili: Negative / Urobili: Negative mg/dL   Blood: x / Protein: Negative / Nitrite: Negative   Leuk Esterase: Negative / RBC: x / WBC x   Sq Epi: x / Non Sq Epi: x / Bacteria: x      CULTURES:  Rapid RVP Result: Detected (23 @ 14:37)        Physical Examination:    General: No acute distress.  Alert, sitting up in bed, on NIPPV    HEENT: Pupils equal, reactive to light.  Symmetric.    PULM: fibrotic changes at lung bases bilaterally, no significant sputum production    CVS: Regular rate and rhythm, no murmurs, rubs, or gallops    ABD: Soft, nondistended, nontender, normoactive bowel sounds, no masses    EXT: No edema, nontender    SKIN: Warm and well perfused, no rashes noted.    NEURO: lightly sedated, follows commands, moves all extremities purposefully        RADIOLOGY:     < from: Xray Chest 1 View- PORTABLE-Urgent (Xray Chest 1 View- PORTABLE-Urgent .) (23 @ 03:36) >  FINDINGS:    Single frontal view of the chest demonstrates stable pulmonary fibrotic   changes. Stable moderate size bilateral hilar and mediastinal calcified   lymph nodes. The cardiomediastinal silhouette is enlarged. Left-sided   AICD. Mediastinal sternotomy wires. No acute osseous abnormalities.   Overlying EKG leads and wires are noted. Consider chest CT as clinically   warranted.    IMPRESSION: Stable pulmonary fibrotic changes. No large pneumothorax.      < from: US Abdomen Upper Quadrant Right (23 @ 18:22) >  FINDINGS:  Liver: Within normal limits.  Bile ducts: Normal caliber. Common bile duct measures 6 mm.  Gallbladder: Distended with multiple stones with wall thickening of 4 to   5 mm. Sonographic Muse's sign is equivocal, in this patient pretreated   with analgesics.  Pancreas: Not well visualized.  Right kidney: 8.1 cm. No hydronephrosis. Cortical thinning and overall   atrophy.  Ascites: None.  IVC: Visualized portions are within normal limits.    IMPRESSION:  Gallstones with additional findings equivocal for acute cholecystitis. A   HIDA scan is advised for further evaluation.          CRITICAL CARE TIME SPENT: 38 mins  Time spent evaluating/treating patient with medical issues that pose a high risk for life threatening deterioration and/or end-organ damage, reviewing data/labs/imaging, discussing case with multidisciplinary team, discussing plan/goals of care with patient/family. Non-inclusive of procedure time.

## 2023-04-28 NOTE — CONSULT NOTE ADULT - ASSESSMENT
Patient is a 72 y/o female with PMHx of Afib-coumadin, HTN, sarcoid, DM ? per pt's daughter not on any meds as not Diabetic ? hypothyroidism, cardiomyopathy , CHF, EF less than 20  % per family, s/p MVR mechanical,  ICD, presents c/o SOB and wheeze x 2 days, coughing with yellow sputum    1) Anemia  multifactorial in setting of acute illness and multiple chronic comorbidities  kebede end out iron studies, b12,folate and supplement if necessary  further anemia w/u when clinically more stable or as outpatient  Keep Hgb > 7    2) Acute on chronic resp failure/sepsis  in ICU, on pressors, BIPAP, IV abx  Mx as per MICU team    3) Cholecystitis/Shock liver  GI on board  surgery eval noted - no surgical intervention currently  monitor    4) CHF/MVR  cardiology on board    Further mx as per MICU team  will follow

## 2023-04-28 NOTE — PROGRESS NOTE ADULT - ASSESSMENT
72 y/o F with a h/o sarcoidosis, NICM (LVEF < 20%), mechanical MVR (on warfarin), pAF, hypothyroid, recent exposure to  who has HMPV, with:    # Mixed septic/cardiogenic shock  # Acute hypoxemic respiratory failure  # HMPV  # DERIK  # Shock liver  # Hyperglycemia  # Metabolic encephalopathy    - actively titrating norepinephrine to maintain a MAP > 65  - maintain fixed dose vasopressin  - formal TTE reveals LVEF < 20%, normal RV  - actively titrating vent settings to maintain SpO2 > 92% and adequate minute ventilation  - repeat ABG indicates compensated metabolic acidosis  - blood and urine cultures are pending, continue empiric cefepime, vancomycin, and azithromycin for now  - DERIK secondary to ischemic ATN, optimize end-organ perfusion as above  - trend BUN/Cr, electrolytes, acid-base balance, and monitor hourly UOP (nonoliguric)  - no indication for urgent hemodialysis at this time  - LFTs continue to slowly uptrend, avoid hepatotoxic agents  - hyperglycemia likely steroid-induced + infection, BG better controlled on 2u/hr insulin infusion, given 10u Lantus and transitioned to Q 6 hour ISS  - dexmedetomidine gtt for light sedation to promote NIPPV compliance and quell agitation and restlessness in critically ill state with multiple invasive lines in place    Case discussed with the patient's daughter at the bedside. I explained to her that Ligia is requiring less IV vasopressor than yesterday, but still quite a lot. She remains critically ill with high risk for cardiac arrest. All questions answered and concerns addressed.    Case discussed with MICU physician, Dr. Mitchell.

## 2023-04-28 NOTE — CONSULT NOTE ADULT - SUBJECTIVE AND OBJECTIVE BOX
Patient is a 74 y/o female with PMHx of Afib-coumadin, HTN, sarcoid, DM ? per pt's daughter not on any meds as not Diabetic ? hypothyroidism, cardiomyopathy , CHF, EF less than 20  % per family, s/p MVR mechanical,  ICD, presents c/o SOB and wheeze x 2 days, coughing with yellow sputum. Her daughter is at bedside. Reports that Pt saw her cardiologist Dr. Barfield yesterday who said Pt may have "lung issue" and advised them to come to ED. Patient was found to be +hMVP.  Pt's  recently got hMPV URI/ pneumonia and was at [facility]. Denies fevers, chills, headache, chest pain, abd. pain , palpitations, nausea, vomiting, diarrhea, hematuria, dysuria, dark stools, or focal neurologic symptoms. Later in the ER pt. had Rapid response due to tachypnea and hypotension. Transferred to MICU.   GI consulted for elevated LFTs. Patient seen and evaluated at bedside, reporting no complaints, no overnight events, Currently on bipap and levophed. Daughter at bedside and reports patient has been having ongoing issues with elevated liver enzymes due to Amiodarone medication requiring occasional dose changes. Denies hx of smoking or ETOH use. Denies any hx liver disease. Elevated LFTs with Alk Phos 185, AST/ALT 1370/1204, Tbili 0.7. US of RUQ on 4/26 showed Gallstones with additional findings equivocal for acute cholecystitis. A HIDA scan is advised for further evaluation. At this time, Denies nausea, vomiting, abdominal pain, fever, chills, chest pain, shortness of breath.  hematology consulted for anemia, patient has h/o anemia and used to f/u with Dr Leigh , after she retired patient was to f/[facility] with [facility] but did not show up to appt.    seen in ICU , on dual pressors, on BIPAP    ros  unable to obtain    PAST MEDICAL & SURGICAL HISTORY:  Hypothyroid      Diabetes      HTN (hypertension)      Cataract      Mitral valve disease  s/p MVR      Sarcoid  involving lungs, heart, ears and eyes      Glaucoma      Paroxysmal atrial fibrillation      VT (ventricular tachycardia)  [facility] refused VT ablation      Nonischemic cardiomyopathy  EF <20%; DDDR ICD '99; gen change '03, BiV upgrade '08, gen change '2014      Systolic heart failure      HLD (hyperlipidemia)      Elective surgery  back surgury      H/O mitral valve replacement      ICD (implantable cardioverter-defibrillator) in place  s/p DDDR ICD '99, gen change '03, BiV upgrade '08, gen change 2014      Social History:  FAMILY HISTORY:  No pertinent family history in first degree relatives    MEDICATIONS  (STANDING):  albuterol/ipratropium for Nebulization 3 milliLiter(s) Nebulizer every 6 hours  azithromycin  IVPB 500 milliGRAM(s) IV Intermittent every 24 hours  azithromycin  IVPB      cefepime  Injectable. 1000 milliGRAM(s) IV Push every 12 hours  chlorhexidine 2% Cloths 1 Application(s) Topical <User Schedule>  dexMEDEtomidine Infusion 0.3 MICROgram(s)/kG/Hr (3.77 mL/Hr) IV Continuous <Continuous>  dextrose 5%. 1000 milliLiter(s) (50 mL/Hr) IV Continuous <Continuous>  dextrose 5%. 1000 milliLiter(s) (100 mL/Hr) IV Continuous <Continuous>  dextrose 50% Injectable 25 Gram(s) IV Push once  dextrose 50% Injectable 25 Gram(s) IV Push once  dextrose 50% Injectable 12.5 Gram(s) IV Push once  dorzolamide 2% Ophthalmic Solution 1 Drop(s) Both EYES three times a day  glucagon  Injectable 1 milliGRAM(s) IntraMuscular once  heparin  Infusion.  Unit(s)/Hr (9 mL/Hr) IV Continuous <Continuous>  insulin glargine Injectable (LANTUS) 10 Unit(s) SubCutaneous at bedtime  insulin lispro (ADMELOG) corrective regimen sliding scale   SubCutaneous every 6 hours  latanoprost 0.005% Ophthalmic Solution 1 Drop(s) Both EYES at bedtime  levothyroxine Injectable 36 MICROGram(s) IV Push at bedtime  methylPREDNISolone sodium succinate Injectable 40 milliGRAM(s) IV Push every 8 hours  norepinephrine Infusion 0.05 MICROgram(s)/kG/Min (4.72 mL/Hr) IV Continuous <Continuous>  vancomycin  IVPB 1250 milliGRAM(s) IV Intermittent <User Schedule>  vasopressin Infusion 0.04 Unit(s)/Min (6 mL/Hr) IV Continuous <Continuous>    ICU Vital Signs Last 24 Hrs  T(C): 36.6 (28 Apr 2023 04:00), Max: 36.6 (28 Apr 2023 03:00)  T(F): 97.9 (28 Apr 2023 04:00), Max: 97.9 (28 Apr 2023 03:00)  HR: 82 (28 Apr 2023 09:30) (79 - 94)  BP: 89/53 (28 Apr 2023 09:00) (88/57 - 130/114)  BP(mean): 65 (28 Apr 2023 09:00) (57 - 120)  ABP: 90/34 (28 Apr 2023 09:30) (86/35 - 115/51)  ABP(mean): 50 (28 Apr 2023 09:30) (48 - 74)  RR: 23 (28 Apr 2023 09:30) (16 - 35)  SpO2: 96% (28 Apr 2023 09:30) (89% - 100%)    O2 Parameters below as of 28 Apr 2023 09:29  Patient On (Oxygen Delivery Method): nasal cannula, high flow    Gen - on BIPAP  heart - s1s2 RRR  Lung - dec BS b/l  abd - tender  Ext edema                          7.9    25.95 )-----------( 137      ( 28 Apr 2023 03:32 )             23.1     04-28    137  |  103  |  28.4<H>  ----------------------------<  205<H>  4.1   |  19.0<L>  |  1.24    Ca    8.8      28 Apr 2023 03:32  Phos  3.8     04-28  Mg     1.8     04-28    TPro  5.9<L>  /  Alb  3.1<L>  /  TBili  0.9  /  DBili  x   /  AST  1102<H>  /  ALT  1120<H>  /  AlkPhos  171<H>  04-28         Patient is a 74 y/o female with PMHx of Afib-coumadin, HTN, sarcoid, DM ? per pt's daughter not on any meds as not Diabetic ? hypothyroidism, cardiomyopathy , CHF, EF less than 20  % per family, s/p MVR mechanical,  ICD, presents c/o SOB and wheeze x 2 days, coughing with yellow sputum. Her daughter is at bedside. Reports that Pt saw her cardiologist Dr. Barfield yesterday who said Pt may have "lung issue" and advised them to come to ED. Patient was found to be +hMVP.  Pt's  recently got hMPV URI/ pneumonia and was at [facility]. Denies fevers, chills, headache, chest pain, abd. pain , palpitations, nausea, vomiting, diarrhea, hematuria, dysuria, dark stools, or focal neurologic symptoms. Later in the ER pt. had Rapid response due to tachypnea and hypotension. Transferred to MICU.   GI consulted for elevated LFTs. Patient seen and evaluated at bedside, reporting no complaints, no overnight events, Currently on bipap and levophed. Daughter at bedside and reports patient has been having ongoing issues with elevated liver enzymes due to Amiodarone medication requiring occasional dose changes. Denies hx of smoking or ETOH use. Denies any hx liver disease. Elevated LFTs with Alk Phos 185, AST/ALT 1370/1204, Tbili 0.7. US of RUQ on 4/26 showed Gallstones with additional findings equivocal for acute cholecystitis. A HIDA scan is advised for further evaluation. At this time, Denies nausea, vomiting, abdominal pain, fever, chills, chest pain, shortness of breath.  hematology consulted for anemia, patient has h/o anemia  was to f/u with [facility] but did not show up to appt.    seen in ICU , on dual pressors, on BIPAP  family at bedside    ros  unable to obtain    PAST MEDICAL & SURGICAL HISTORY:  Hypothyroid      Diabetes      HTN (hypertension)      Cataract      Mitral valve disease  s/p MVR      Sarcoid  involving lungs, heart, ears and eyes      Glaucoma      Paroxysmal atrial fibrillation      VT (ventricular tachycardia)  [facility] refused VT ablation      Nonischemic cardiomyopathy  EF <20%; DDDR ICD '99; gen change '03, BiV upgrade '08, gen change '2014      Systolic heart failure      HLD (hyperlipidemia)      Elective surgery  back surgury      H/O mitral valve replacement      ICD (implantable cardioverter-defibrillator) in place  s/p DDDR ICD '99, gen change '03, BiV upgrade '08, gen change 2014      Social History:  FAMILY HISTORY:  No pertinent family history in first degree relatives    MEDICATIONS  (STANDING):  albuterol/ipratropium for Nebulization 3 milliLiter(s) Nebulizer every 6 hours  azithromycin  IVPB 500 milliGRAM(s) IV Intermittent every 24 hours  azithromycin  IVPB      cefepime  Injectable. 1000 milliGRAM(s) IV Push every 12 hours  chlorhexidine 2% Cloths 1 Application(s) Topical <User Schedule>  dexMEDEtomidine Infusion 0.3 MICROgram(s)/kG/Hr (3.77 mL/Hr) IV Continuous <Continuous>  dextrose 5%. 1000 milliLiter(s) (50 mL/Hr) IV Continuous <Continuous>  dextrose 5%. 1000 milliLiter(s) (100 mL/Hr) IV Continuous <Continuous>  dextrose 50% Injectable 25 Gram(s) IV Push once  dextrose 50% Injectable 25 Gram(s) IV Push once  dextrose 50% Injectable 12.5 Gram(s) IV Push once  dorzolamide 2% Ophthalmic Solution 1 Drop(s) Both EYES three times a day  glucagon  Injectable 1 milliGRAM(s) IntraMuscular once  heparin  Infusion.  Unit(s)/Hr (9 mL/Hr) IV Continuous <Continuous>  insulin glargine Injectable (LANTUS) 10 Unit(s) SubCutaneous at bedtime  insulin lispro (ADMELOG) corrective regimen sliding scale   SubCutaneous every 6 hours  latanoprost 0.005% Ophthalmic Solution 1 Drop(s) Both EYES at bedtime  levothyroxine Injectable 36 MICROGram(s) IV Push at bedtime  methylPREDNISolone sodium succinate Injectable 40 milliGRAM(s) IV Push every 8 hours  norepinephrine Infusion 0.05 MICROgram(s)/kG/Min (4.72 mL/Hr) IV Continuous <Continuous>  vancomycin  IVPB 1250 milliGRAM(s) IV Intermittent <User Schedule>  vasopressin Infusion 0.04 Unit(s)/Min (6 mL/Hr) IV Continuous <Continuous>    ICU Vital Signs Last 24 Hrs  T(C): 36.6 (28 Apr 2023 04:00), Max: 36.6 (28 Apr 2023 03:00)  T(F): 97.9 (28 Apr 2023 04:00), Max: 97.9 (28 Apr 2023 03:00)  HR: 82 (28 Apr 2023 09:30) (79 - 94)  BP: 89/53 (28 Apr 2023 09:00) (88/57 - 130/114)  BP(mean): 65 (28 Apr 2023 09:00) (57 - 120)  ABP: 90/34 (28 Apr 2023 09:30) (86/35 - 115/51)  ABP(mean): 50 (28 Apr 2023 09:30) (48 - 74)  RR: 23 (28 Apr 2023 09:30) (16 - 35)  SpO2: 96% (28 Apr 2023 09:30) (89% - 100%)    O2 Parameters below as of 28 Apr 2023 09:29  Patient On (Oxygen Delivery Method): nasal cannula, high flow    Gen - on BIPAP  heart - s1s2 RRR  Lung - dec BS b/l  abd - tender  Ext edema                          7.9    25.95 )-----------( 137      ( 28 Apr 2023 03:32 )             23.1     04-28    137  |  103  |  28.4<H>  ----------------------------<  205<H>  4.1   |  19.0<L>  |  1.24    Ca    8.8      28 Apr 2023 03:32  Phos  3.8     04-28  Mg     1.8     04-28    TPro  5.9<L>  /  Alb  3.1<L>  /  TBili  0.9  /  DBili  x   /  AST  1102<H>  /  ALT  1120<H>  /  AlkPhos  171<H>  04-28

## 2023-04-28 NOTE — PROGRESS NOTE ADULT - SUBJECTIVE AND OBJECTIVE BOX
Patient is a 73y old  Female who presents with a chief complaint of Viral infection     (2023 09:44)        PAST MEDICAL & SURGICAL HISTORY:  Hypothyroid      Diabetes      HTN (hypertension)      Cataract      Mitral valve disease  s/p MVR      Sarcoid  involving lungs, heart, ears and eyes      Glaucoma      Paroxysmal atrial fibrillation      VT (ventricular tachycardia)  [facility] refused VT ablation      Nonischemic cardiomyopathy  EF <20%; DDDR ICD '99; gen change , BiV upgrade , gen change       Systolic heart failure      HLD (hyperlipidemia)      Elective surgery  back surgury      H/O mitral valve replacement      ICD (implantable cardioverter-defibrillator) in place  s/p DDDR ICD ', gen change , BiV upgrade , gen change           Summary of admission HPI:        PREVIOUS DIAGNOSTIC TESTING:    ECHO:    STRESS:    CATHETERIZATION:    ELECTROPHYSIOLOGY STUDY:    CAROTID ULTRASOUND:    VENOUS DUPLEX SCAN:    CHEST CT PULMONARY ANGIO with IV Contrast:    CXR:    other Radiology studies:      MEDICATIONS  (STANDING):  albuterol/ipratropium for Nebulization 3 milliLiter(s) Nebulizer every 6 hours  azithromycin  IVPB 500 milliGRAM(s) IV Intermittent every 24 hours  azithromycin  IVPB      cefepime  Injectable. 1000 milliGRAM(s) IV Push every 12 hours  chlorhexidine 2% Cloths 1 Application(s) Topical <User Schedule>  dexMEDEtomidine Infusion 0.3 MICROgram(s)/kG/Hr (3.77 mL/Hr) IV Continuous <Continuous>  dextrose 5%. 1000 milliLiter(s) (50 mL/Hr) IV Continuous <Continuous>  dextrose 5%. 1000 milliLiter(s) (100 mL/Hr) IV Continuous <Continuous>  dextrose 50% Injectable 12.5 Gram(s) IV Push once  dextrose 50% Injectable 25 Gram(s) IV Push once  dextrose 50% Injectable 25 Gram(s) IV Push once  dorzolamide 2% Ophthalmic Solution 1 Drop(s) Both EYES three times a day  glucagon  Injectable 1 milliGRAM(s) IntraMuscular once  heparin  Infusion.  Unit(s)/Hr (9 mL/Hr) IV Continuous <Continuous>  insulin glargine Injectable (LANTUS) 10 Unit(s) SubCutaneous at bedtime  insulin lispro (ADMELOG) corrective regimen sliding scale   SubCutaneous every 6 hours  latanoprost 0.005% Ophthalmic Solution 1 Drop(s) Both EYES at bedtime  levothyroxine Injectable 36 MICROGram(s) IV Push at bedtime  methylPREDNISolone sodium succinate Injectable 40 milliGRAM(s) IV Push every 8 hours  midodrine 10 milliGRAM(s) Oral every 8 hours  norepinephrine Infusion 0.05 MICROgram(s)/kG/Min (4.72 mL/Hr) IV Continuous <Continuous>  vancomycin  IVPB 1250 milliGRAM(s) IV Intermittent <User Schedule>  vasopressin Infusion 0.04 Unit(s)/Min (6 mL/Hr) IV Continuous <Continuous>    MEDICATIONS  (PRN):  albuterol    0.083% 2.5 milliGRAM(s) Nebulizer every 2 hours PRN Shortness of Breath and/or Wheezing  dextrose Oral Gel 15 Gram(s) Oral once PRN Blood Glucose LESS THAN 70 milliGRAM(s)/deciliter  heparin   Injectable 2000 Unit(s) IV Push every 6 hours PRN For aPTT between 40 - 57  heparin   Injectable 4000 Unit(s) IV Push every 6 hours PRN For aPTT less than 40      FAMILY HISTORY:  No pertinent family history in first degree relatives          Vital Signs Last 24 Hrs  T(C): 36.6 (2023 04:00), Max: 36.6 (2023 03:00)  T(F): 97.9 (2023 04:00), Max: 97.9 (2023 03:00)  HR: 87 (2023 19:00) (76 - 90)  BP: 120/94 (2023 19:00) (74/44 - 130/114)  BP(mean): 103 (2023 19:00) (48 - 120)  RR: 21 (2023 19:00) (16 - 35)  SpO2: 100% (2023 19:00) (89% - 100%)    Parameters below as of 2023 09:29  Patient On (Oxygen Delivery Method): nasal cannula, high flow        PHYSICAL EXAM:    GENERAL: Agonal breathing on BIPAP.  HEAD:  Atraumatic, Normocephalic  EYES: EOMI, PERRLA, conjunctiva and sclera clear  ENMT: No tonsillar erythema, exudates, or enlargement; Moist mucous membranes, Good dentition, No lesions  NECK: Supple and normal thyroid.  No JVD or carotid bruit.  Carotid pulse is 2+ bilaterally.  HEART: Regular rate and rhythm; No murmurs, rubs, or gallops.  PULMONARY: Diffuse bilateral inspiratory and expiratory wheezing and rhonchi bilaterally  ABDOMEN: Soft, Nontender, Nondistended; Bowel sounds present  EXTREMITIES:  1-2+nonpitting lower extremity edema  LYMPH: No lymphadenopathy noted  NEUROLOGICAL: Sedated and nonresponsive          INTERPRETATION OF TELEMETRY:  Normal atrial sensing and ventricular paced rhythm.    ECG:    I&O's Detail    2023 07:  -  2023 07:00  --------------------------------------------------------  IN:    Dexmedetomidine: 332.9 mL    Heparin Infusion: 160 mL    Insulin: 35 mL    IV PiggyBack: 250 mL    IV PiggyBack: 200 mL    IV PiggyBack: 250 mL    Norepinephrine: 316 mL    Norepinephrine: 184.8 mL    Vasopressin: 144 mL  Total IN: 1872.7 mL    OUT:    Indwelling Catheter - Urethral (mL): 2055 mL  Total OUT: 2055 mL    Total NET: -182.3 mL      2023 07:01  -  2023 19:19  --------------------------------------------------------  IN:    Dexmedetomidine: 149 mL    Heparin Infusion: 60 mL    IV PiggyBack: 250 mL    Norepinephrine: 456 mL    Vasopressin: 72 mL  Total IN: 987 mL    OUT:    Indwelling Catheter - Urethral (mL): 270 mL  Total OUT: 270 mL    Total NET: 717 mL          LABS:                        7.9    25.95 )-----------( 137      ( 2023 03:32 )             23.1         137  |  103  |  28.4<H>  ----------------------------<  205<H>  4.1   |  19.0<L>  |  1.24    Ca    8.8      2023 03:32  Phos  3.8       Mg     1.8         TPro  5.9<L>  /  Alb  3.1<L>  /  TBili  0.9  /  DBili  x   /  AST  1102<H>  /  ALT  1120<H>  /  AlkPhos  171<H>          PT/INR - ( 2023 18:10 )   PT: 39.5 sec;   INR: 3.36 ratio         PTT - ( 2023 11:04 )  PTT:>200.0 sec  Urinalysis Basic - ( 2023 22:40 )    Color: Yellow / Appearance: Clear / S.015 / pH: x  Gluc: x / Ketone: Negative  / Bili: Negative / Urobili: Negative mg/dL   Blood: x / Protein: Negative / Nitrite: Negative   Leuk Esterase: Negative / RBC: x / WBC x   Sq Epi: x / Non Sq Epi: x / Bacteria: x      BNP  Daily     Daily Weight in k.8 (2023 03:00)

## 2023-04-28 NOTE — DIETITIAN INITIAL EVALUATION ADULT - PROBLEM SELECTOR PLAN 3
pt. will possibly benefit from iv diuresis once bp stable. echo to follow. hold entresto for now . ER called cardiology consult

## 2023-04-28 NOTE — PROGRESS NOTE ADULT - CRITICAL CARE ATTENDING COMMENT
Patient examined, chart and telemetry reviewed, case discussed with patient's family, critical care team, and nursing.

## 2023-04-28 NOTE — DIETITIAN INITIAL EVALUATION ADULT - PROBLEM SELECTOR PLAN 6
po synthroid can be started if on po meds tomorrow if not iv synthroid ot be considered. Nasalis-Muscle-Based Myocutaneous Island Pedicle Flap Text: Using a #15 blade, an incision was made around the donor flap to the level of the nasalis muscle. Wide lateral undermining was then performed in both the subcutaneous plane above the nasalis muscle, and in a submuscular plane just above periosteum. This allowed the formation of a free nasalis muscle axial pedicle (based on the angular artery) which was still attached to the actual cutaneous flap, increasing its mobility and vascular viability. Hemostasis was obtained with pinpoint electrocoagulation. The flap was mobilized into position and the pivotal anchor points positioned and stabilized with buried interrupted sutures. Subcutaneous and dermal tissues were closed in a multilayered fashion with sutures. Tissue redundancies were excised, and the epidermal edges were apposed without significant tension and sutured with sutures.

## 2023-04-28 NOTE — PROGRESS NOTE ADULT - ASSESSMENT
Patient is a 72 y/o female with PMHx of Afib-coumadin, HTN, sarcoid, DM ? per pt's daughter not on any meds as not Diabetic ? hypothyroidism, cardiomyopathy , CHF, EF less than 20  % per family, s/p MVR mechanical,  ICD, presents c/o SOB and wheeze x 2 days, coughing with yellow sputum. Patient was found to be +hMVP. Later in the ER pt, had Rapid response due to tachypnea and hypotension. Transferred to MICU.     Transaminitis: DDX Shock liver, DILI   Today, LFTs slightly trending down with Alk Phos 171, AST/ALT 1102/1120, Tbili 0.9.   US of RUQ on 4/26 showed Gallstones with additional findings equivocal for acute cholecystitis. Surgery recs appreciated. No surgical interventions needed   Daughter reports issues with elevated liver enzymes due to Amiodarone medication requiring occasional dose changes.   Denies hx of smoking or ETOH use. Denies any hx liver disease.    - Pending acute viral hepatitis panel, and autoimmune workup   - Avoid Hepatotoxic drugs   - Avoid hypotension   - Continue to trend LFTs   - Rest of care as per primary team

## 2023-04-28 NOTE — DIETITIAN INITIAL EVALUATION ADULT - CONTINUE CURRENT NUTRITION CARE PLAN
- Advance diet as medically feasible/tolerable; if concerns for chewing/swallowing difficulty, suggest SLP swallow evaluation prior to diet advancement./yes

## 2023-04-28 NOTE — DIETITIAN INITIAL EVALUATION ADULT - PERTINENT MEDS FT
MEDICATIONS  (STANDING):  albuterol/ipratropium for Nebulization 3 milliLiter(s) Nebulizer every 6 hours  azithromycin  IVPB 500 milliGRAM(s) IV Intermittent every 24 hours  azithromycin  IVPB      cefepime  Injectable. 1000 milliGRAM(s) IV Push every 12 hours  chlorhexidine 2% Cloths 1 Application(s) Topical <User Schedule>  dexMEDEtomidine Infusion 0.3 MICROgram(s)/kG/Hr (3.77 mL/Hr) IV Continuous <Continuous>  dextrose 5%. 1000 milliLiter(s) (50 mL/Hr) IV Continuous <Continuous>  dextrose 5%. 1000 milliLiter(s) (100 mL/Hr) IV Continuous <Continuous>  dextrose 50% Injectable 25 Gram(s) IV Push once  dextrose 50% Injectable 12.5 Gram(s) IV Push once  dextrose 50% Injectable 25 Gram(s) IV Push once  dorzolamide 2% Ophthalmic Solution 1 Drop(s) Both EYES three times a day  glucagon  Injectable 1 milliGRAM(s) IntraMuscular once  heparin  Infusion.  Unit(s)/Hr (9 mL/Hr) IV Continuous <Continuous>  insulin glargine Injectable (LANTUS) 10 Unit(s) SubCutaneous at bedtime  insulin lispro (ADMELOG) corrective regimen sliding scale   SubCutaneous every 6 hours  latanoprost 0.005% Ophthalmic Solution 1 Drop(s) Both EYES at bedtime  levothyroxine Injectable 36 MICROGram(s) IV Push at bedtime  methylPREDNISolone sodium succinate Injectable 40 milliGRAM(s) IV Push every 8 hours  norepinephrine Infusion 0.05 MICROgram(s)/kG/Min (4.72 mL/Hr) IV Continuous <Continuous>  vancomycin  IVPB 1250 milliGRAM(s) IV Intermittent <User Schedule>  vasopressin Infusion 0.04 Unit(s)/Min (6 mL/Hr) IV Continuous <Continuous>    MEDICATIONS  (PRN):  albuterol    0.083% 2.5 milliGRAM(s) Nebulizer every 4 hours PRN Shortness of Breath and/or Wheezing  dextrose Oral Gel 15 Gram(s) Oral once PRN Blood Glucose LESS THAN 70 milliGRAM(s)/deciliter  heparin   Injectable 2000 Unit(s) IV Push every 6 hours PRN For aPTT between 40 - 57  heparin   Injectable 4000 Unit(s) IV Push every 6 hours PRN For aPTT less than 40

## 2023-04-28 NOTE — DIETITIAN INITIAL EVALUATION ADULT - PERTINENT LABORATORY DATA
04-28    137  |  103  |  28.4<H>  ----------------------------<  205<H>  4.1   |  19.0<L>  |  1.24    Ca    8.8      28 Apr 2023 03:32  Phos  3.8     04-28  Mg     1.8     04-28    TPro  5.9<L>  /  Alb  3.1<L>  /  TBili  0.9  /  DBili  x   /  AST  1102<H>  /  ALT  1120<H>  /  AlkPhos  171<H>  04-28  POCT Blood Glucose.: 185 mg/dL (04-28-23 @ 05:10)  A1C with Estimated Average Glucose Result: 6.1 % (04-27-23 @ 05:25)

## 2023-04-28 NOTE — PROGRESS NOTE ADULT - TIME BILLING
Labs and U/S results were reviewed. Surgical consult also appreciated. Pt. seen and examined at the bedside. Pt's overall status is tenuous. Labs and U/S results were reviewed. Surgical consult also appreciated. Pt. seen and examined at the bedside. Pt's overall status is tenuous. Continue to hold Amiodarone and avoid all hepatotoxic drugs for now.

## 2023-04-28 NOTE — DIETITIAN INITIAL EVALUATION ADULT - ETIOLOGY
related to inability to meet sufficient protein-energy in setting of acute respiratory failure requiring BiPAP, HMPV, encephalopathy

## 2023-04-28 NOTE — DIETITIAN INITIAL EVALUATION ADULT - NSICDXPASTMEDICALHX_GEN_ALL_CORE_FT
PAST MEDICAL HISTORY:  Cataract     Diabetes     Glaucoma     HLD (hyperlipidemia)     HTN (hypertension)     Hypothyroid     Mitral valve disease s/p MVR    Nonischemic cardiomyopathy EF <20%; DDDR ICD '99; gen change '03, BiV upgrade '08, gen change '2014    Paroxysmal atrial fibrillation     Sarcoid involving lungs, heart, ears and eyes    Systolic heart failure     VT (ventricular tachycardia) [facility] refused VT ablation

## 2023-04-28 NOTE — PROGRESS NOTE ADULT - ASSESSMENT
Patient is a 73y old  Female with h/o severe MR, treated with mechanical MVR, HTN, IDDM, HLD, renal insufficiency,  severe nonischemic dilated cardiomyopathy with LVEF=15 %, chronic systolic CHF, paroxysmal VT, s/p DDD biventricular ICD implantation, paroxysmal atrial tachycardia, prior CVA off of anticoagulation, pulmonary sarcoidosis,  glaucoma, who presented on 4/26/2023 with a chief complaint of cough productive of yellowish , chills, dyspnea, chills, severe headaches, 3 pillow orthopnea x 1 to 2 days and was referred to ER during an office visti 4/26/2023 after patient was found to be hypotensive with BP of 80/60 mm Hg with diffuse inspiratory/expiratory wheezing and rhonchi bilaterally.  CXR 4/26/2023 stable chronic fibrotic changes, abdominal u/s 4/26/2023 gallstones can not rule out acute cholecystitis, elevated proBNP of 2,551, abnormal LFTs: AST 1370, ALT 1209, , Positive HMPV titers (Resp RVP).    Assessment/Recommendations:   1.  Admitted 4/26/2022 with acute on chronic respiratory failure 2ndary to viral pulmonary infection with HMPV (RepRVP) pneumonia 2ndary to exposure to infected , complicated by sepsis with hypothermia and hypotension-h/o pulmonary sarcoidosis.    -Ongoing pressure support with Norepinephrine, vasopressin, Stress dose of steroids.  -pulmonary support with BIPAP mask therapy with IV sedation with IV Dexmedetomidine  -Empiric IV Azithryomycin, Vancomycin, Cefepime.  -Consider portable CXR.  -consider standing dose of Xopenex nebulizer q 4 hours.  -Patient may benefit from elective intubation.  -Send proBNP levels.    2.  H/o severe, nonischemic dilated cardiomyopathy with LVEF=15-20 %-s/p mechanically MVR for severe MR and chronic systolic CHF-proBNP of 2,551-CXR 4/26/2023 demonstrates chronic fibrotic changes with no superimposed infiltrates or pleural effusions.      -Daily weights, cautious IV hydration, continue to follow BNP levels.    -Holding IV diuresis, given persistant hypotension.    3.  Chronic renal insufficiency-BUN/Cr 34.1/1.71 on 4/27/2023 decreased to 28.4/1.24 on 4/28/2023    4.  H/o VT-s/p DDD biventricular ICD implantation-normal DDD biventricular pacing on telemetry    5.  H/o paroxysmal atrial tachycardia-maintaining sinus rhythm.      6.  H/o iron deficiency anemia-HH 7.9/23.1 on 4/28/2023.    -Consider transfusing with 1-2 units of pRBCs and Lasix 20 mg IV, to achieve hemoglobin of <8.5.

## 2023-04-29 NOTE — PROCEDURE NOTE - NSTRACHPOSTINTU_RESP_A_CORE
CXR pending/Appropriate capnography/Breath sounds bilateral/Breath sounds equal/Chest excursion noted/Chest X-Ray/Positive end tidal Co2 noted

## 2023-04-29 NOTE — CONSULT NOTE ADULT - ASSESSMENT
The patient is a 73 year old female with PMH of DM, HTN, Afib, nonischemic dilated cardiomyopathy, HFrEF (EF 15%), s/p ICD, severe MR s/p mechanical MVR, paroxysmal VT, sarcoidosis, and hypothyroidism presents with SOB associated with productive cough. Admitted for acute hypoxic respiratory failure secondary to human metapneumovirus, ultimately requiring intubation on 04/29/2023. Hospital course notable for septic shock; currently on multiple pressors; transaminitis; and DERIK. Nephrology is consulted for DERIK associated with acid base disturbance; request for evaluation for dialysis.    -Baseline creatinine is 1.6mg/dL in Jan 2023 (as per [facility])  -On admission, creatinine was at baseline (suspecting baseline creatinine to range ~1.4-1.7mg/dL); slight up trend to 1.9mg/dL today associated with worsening oliguria   -UA was bland on admission  -Will recheck UA given rise in creatinine   -Hemodynamically unstable - currently on multiple pressors   -Vancomycin trough noted to be > 80, however not a true trough as it was drawn 2 hours AFTER IV vancomycin was given   -Acid base status - notable for rising lactate; repeat lactate now in the 7's   -Given DERIK and worsening acid base status - dialysis is warranted at this time - CRRT preferred to intermittent HD given hemodynamic instability   -CRRT orders are as follows:    -Prognosis is quite guarded - should there be worsening of hemodynamics while on CRRT, then CRRT will be discontinued and goals of care will need to be re-addressed     Navarro Lucio DO  Nephrology  [facility]  Office Number [phone] The patient is a 73 year old female with PMH of DM, HTN, Afib, nonischemic dilated cardiomyopathy, HFrEF (EF 15%), s/p ICD, severe MR s/p mechanical MVR, paroxysmal VT, sarcoidosis, and hypothyroidism presents with SOB associated with productive cough. Admitted for acute hypoxic respiratory failure secondary to human metapneumovirus, ultimately requiring intubation on 04/29/2023. Hospital course notable for shock; currently on multiple pressors; transaminitis; and DERIK. Nephrology is consulted for DERIK associated with acid base disturbance; request for evaluation for dialysis.    -Baseline creatinine is 1.6mg/dL in Jan 2023 (as per [facility])  -On admission, creatinine was at baseline (suspecting baseline creatinine to range ~1.4-1.7mg/dL); slight up trend to 1.9mg/dL today associated with worsening oliguria   -UA was bland on admission  -Will recheck UA given rise in creatinine   -Hemodynamically unstable - currently on multiple pressors   -Vancomycin trough noted to be > 80, however NOT a true trough as it was drawn 2 hours AFTER IV vancomycin was given   -Acid base status - notable for rising lactate; repeat lactate now in the 7's   -Given DERIK and worsening acid base status - dialysis is warranted at this time - CRRT preferred to intermittent HD given hemodynamic instability   -CRRT orders are as follows:  CVVHDF, blood flow rate 200, prefilter replacement fluid rate 400, post filter replacement rate 400, dialysate rate 800, net even ultrafiltration   -Prognosis is quite guarded - should there be worsening of hemodynamics while on CRRT, then CRRT will be discontinued and goals of care will need to be re-addressed as intolerance to CRRT portends a poor diagnosis    Navarro Lucio DO  Nephrology  [facility]  Office Number [phone] The patient is a 73 year old female with PMH of DM, HTN, Afib, nonischemic dilated cardiomyopathy, HFrEF (EF 15%), s/p ICD, severe MR s/p mechanical MVR, paroxysmal VT, sarcoidosis, and hypothyroidism presents with SOB associated with productive cough. Admitted for acute hypoxic respiratory failure secondary to human metapneumovirus, ultimately requiring intubation on 04/29/2023. Hospital course notable for shock; currently on multiple pressors; transaminitis; and DERIK. Nephrology is consulted for DERIK associated with acid base disturbance; request for evaluation for dialysis.    -Baseline creatinine is 1.6mg/dL in Jan 2023 (as per [facility])  -On admission, creatinine was at baseline (suspecting baseline creatinine to range ~1.4-1.7mg/dL); slight up trend to 1.9mg/dL today associated with worsening oliguria   -UA was bland on admission  -Will recheck UA given rise in creatinine   -Hemodynamically unstable - currently on multiple pressors   -Vancomycin trough noted to be > 80, however NOT a true trough as it was drawn 2 hours AFTER IV vancomycin was given   -Acid base status - notable for rising lactate; repeat lactate now in the 7's; high anion gap metabolic acidosis refractory to bicarb gtt; downtrending pH  -Given DERIK and worsening acid base status - dialysis is warranted at this time - CRRT preferred to intermittent HD given hemodynamic instability   -CRRT orders are as follows:  CVVHDF, blood flow rate 200, prefilter replacement fluid rate 400, post filter replacement fluid rate 400, dialysate rate 800, net even ultrafiltration   -Prognosis is quite guarded - should there be worsening of hemodynamics while on CRRT, then CRRT will be discontinued and goals of care will need to be re-addressed as intolerance to CRRT portends a poor diagnosis    Navarro Lucio DO  Nephrology  [facility]  Office Number [phone] The patient is a 73 year old female with PMH of DM, HTN, Afib, nonischemic dilated cardiomyopathy, HFrEF (EF 15%), s/p ICD, severe MR s/p mechanical MVR, paroxysmal VT, sarcoidosis, and hypothyroidism presents with SOB associated with productive cough. Admitted for acute hypoxic respiratory failure secondary to human metapneumovirus, ultimately requiring intubation on 04/29/2023. Hospital course notable for shock; currently on multiple pressors; transaminitis; and DERIK. Nephrology is consulted for DERIK associated with acid base disturbance; request for evaluation for dialysis.    -Baseline creatinine is 1.6mg/dL in Jan 2023 (as per [facility])  -On admission, creatinine was at baseline (suspecting baseline creatinine to range ~1.4-1.7mg/dL); slight up trend to 1.9mg/dL today associated with worsening oliguria   -UA was bland on admission  -Will recheck UA given rise in creatinine   -Hemodynamically unstable - currently on multiple pressors   -Vancomycin trough noted to be > 80, however NOT a true trough as it was drawn 2 hours AFTER IV vancomycin was given   -Acid base status - notable for rising lactate; repeat lactate now in the 7's; high anion gap metabolic acidosis refractory to bicarb gtt; downtrending pH  -Given DERIK and worsening acid base status - dialysis is warranted at this time - CRRT preferred to intermittent HD given hemodynamic instability   -CRRT orders are as follows:  CVVHDF, blood flow rate 200, prefilter replacement fluid rate 400, post filter replacement fluid rate 400, dialysate rate 800, net even ultrafiltration   -Prognosis is quite guarded - should there be worsening of hemodynamics while on CRRT, then CRRT will be discontinued and goals of care will need to be re-addressed as intolerance to CRRT portends a poor diagnosis  -Consent for dialysis was obtained from daughter - signed, witnessed and placed into chart     Navarro Lucio DO  Nephzeenat  [facility]  Office Number [phone]

## 2023-04-29 NOTE — PROGRESS NOTE ADULT - PROBLEM SELECTOR PLAN 1
LAEs Slightly trending down,  AST//931 (AST/ALT 1102/1120 yesterday). Alk Phos 151 from 171. INR 3.36. Normal T bili.  Patient's hemodynamic status deteriorated overnight, currently intubated, requiring vasopressors and also on Dobutamine.   Continue to trend LFTs   Rest of care as per primary team  Avoid hepatotoxic agents if clinically able. GOC conversation.

## 2023-04-29 NOTE — PROGRESS NOTE ADULT - SUBJECTIVE AND OBJECTIVE BOX
INTERVAL HISTORY:  Now intubated  On pressors  	  MEDICATIONS:  DOBUTamine Infusion 2.5 MICROgram(s)/kG/Min IV Continuous <Continuous>  midodrine 10 milliGRAM(s) Oral every 8 hours  norepinephrine Infusion 0.05 MICROgram(s)/kG/Min IV Continuous <Continuous>    azithromycin  IVPB 500 milliGRAM(s) IV Intermittent every 24 hours  azithromycin  IVPB      cefepime  Injectable. 1000 milliGRAM(s) IV Push every 12 hours  vancomycin  IVPB 1250 milliGRAM(s) IV Intermittent <User Schedule>    albuterol    0.083% 2.5 milliGRAM(s) Nebulizer every 2 hours PRN  albuterol/ipratropium for Nebulization 3 milliLiter(s) Nebulizer every 4 hours  buDESOnide    Inhalation Suspension 0.5 milliGRAM(s) Inhalation every 12 hours    cisatracurium Infusion 2.999 MICROgram(s)/kG/Min IV Continuous <Continuous>  dexMEDEtomidine Infusion 0.3 MICROgram(s)/kG/Hr IV Continuous <Continuous>  fentaNYL   Infusion. 0.5 MICROgram(s)/kG/Hr IV Continuous <Continuous>  propofol Infusion 5 MICROgram(s)/kG/Min IV Continuous <Continuous>    pantoprazole  Injectable 40 milliGRAM(s) IV Push two times a day    dextrose 50% Injectable 25 Gram(s) IV Push once  dextrose 50% Injectable 12.5 Gram(s) IV Push once  dextrose 50% Injectable 25 Gram(s) IV Push once  dextrose Oral Gel 15 Gram(s) Oral once PRN  glucagon  Injectable 1 milliGRAM(s) IntraMuscular once  insulin glargine Injectable (LANTUS) 10 Unit(s) SubCutaneous at bedtime  insulin lispro (ADMELOG) corrective regimen sliding scale   SubCutaneous every 6 hours  levothyroxine Injectable 36 MICROGram(s) IV Push at bedtime  methylPREDNISolone sodium succinate Injectable 60 milliGRAM(s) IV Push every 6 hours  vasopressin Infusion 0.04 Unit(s)/Min IV Continuous <Continuous>    chlorhexidine 0.12% Liquid 15 milliLiter(s) Oral Mucosa every 12 hours  chlorhexidine 2% Cloths 1 Application(s) Topical <User Schedule>  dextrose 5%. 1000 milliLiter(s) IV Continuous <Continuous>  dextrose 5%. 1000 milliLiter(s) IV Continuous <Continuous>  dorzolamide 2% Ophthalmic Solution 1 Drop(s) Both EYES three times a day  heparin   Injectable 2000 Unit(s) IV Push every 6 hours PRN  heparin   Injectable 4000 Unit(s) IV Push every 6 hours PRN  heparin  Infusion. 400 Unit(s)/Hr IV Continuous <Continuous>  latanoprost 0.005% Ophthalmic Solution 1 Drop(s) Both EYES at bedtime        PHYSICAL EXAM:    T(C): 36 (23 @ 12:00), Max: 37.3 (23 @ 20:30)  HR: 88 (23 @ 13:15) (76 - 89)  BP: 84/40 (23 @ 13:00) (61/31 - 120/94)  RR: 30 (23 @ 13:15) (13 - 47)  SpO2: 98% (23 @ 12:45) (86% - 100%)  Wt(kg): --    I&O's Summary    2023 07:01  -  2023 07:00  --------------------------------------------------------  IN: 1834.6 mL / OUT: 355 mL / NET: 1479.6 mL        Daily     Daily Weight in k.4 (2023 03:00)    Appearance: Intubated	  Cardiovascular: Normal S1 S2, No JVD, No murmurs, No edema  Respiratory: Coarse breath sounds	  Gastrointestinal:  Soft, Non-tender, + BS	  Neurologic: Intubated                          8.6    23.15 )-----------( 105      ( 2023 12:00 )             25.5         136  |  101  |  49.9<H>  ----------------------------<  266<H>  4.5   |  19.0<L>  |  1.95<H>    Ca    8.3<L>      2023 12:00  Phos  5.6       Mg     1.8         TPro  4.9<L>  /  Alb  2.6<L>  /  TBili  2.1<H>  /  DBili  x   /  AST  692<H>  /  ALT  865<H>  /  AlkPhos  147<H>      proBNP:   Lipid Profile:   HgA1c:     ASSESSMENT/PLAN: 	    Patient is a 73y old  Female with h/o severe MR, treated with mechanical MVR, HTN, IDDM, HLD, renal insufficiency,  severe nonischemic dilated cardiomyopathy with LVEF=15 %, chronic systolic CHF, paroxysmal VT, s/p DDD biventricular ICD implantation, paroxysmal atrial tachycardia, prior CVA off of anticoagulation, pulmonary sarcoidosis,  glaucoma, who presented on 2023 with a chief complaint of cough productive of yellowish , chills, dyspnea, chills, severe headaches, 3 pillow orthopnea x 1 to 2 days and was referred to ER during an office visti 2023 after patient was found to be hypotensive with BP of 80/60 mm Hg with diffuse inspiratory/expiratory wheezing and rhonchi bilaterally.  CXR 2023 stable chronic fibrotic changes, abdominal u/s 2023 gallstones can not rule out acute cholecystitis, elevated proBNP of 2,551, abnormal LFTs: AST 1370, ALT 1209, , Positive HMPV titers (Resp RVP).    Now on pressors and intubated  Supportive care and management as per ICU team

## 2023-04-29 NOTE — CONSULT NOTE ADULT - SUBJECTIVE AND OBJECTIVE BOX
The patient is a 73 year old female with PMH of DM, HTN, Afib, nonischemic dilated cardiomyopathy, HFrEF (EF 15%), s/p ICD, severe MR s/p mechanical MVR, paroxysmal VT, sarcoidosis, and hypothyroidism presents with SOB associated with productive cough. Admitted for acute hypoxic respiratory failure secondary to human metapneumovirus, ultimately requiring intubation on 04/29/2023. Hospital course notable for septic shock; currently on multiple pressors; transaminitis; and DERIK. Nephrology is consulted for DERIK associated with acid base disturbance; request for evaluation for dialysis.    PAST MEDICAL & SURGICAL HISTORY:  Hypothyroid  Diabetes  HTN (hypertension)  Cataract  Mitral valve disease  s/p MVR  Sarcoid  involving lungs, heart, ears and eyes  Glaucoma  Paroxysmal atrial fibrillation  VT (ventricular tachycardia)  H refused VT ablation  Nonischemic cardiomyopathy  EF <20%; DDDR ICD '99; gen change '03, BiV upgrade '08, gen change '2014  Systolic heart failure  HLD (hyperlipidemia)  Elective surgery  back surgury  H/O mitral valve replacement  ICD (implantable cardioverter-defibrillator) in place  s/p DDDR ICD '99, gen change '03, BiV upgrade '08, gen change 2014    Allergies  No Known Allergies  Intolerances    Home Medications:  amiodarone 200 mg oral tablet: 0.5 tab(s) orally once a day (in the evening) (27 Apr 2023 12:19)  atorvastatin 20 mg oral tablet: 1 tab(s) orally once a day (in the evening) (27 Apr 2023 12:19)  baclofen 20 mg oral tablet: 1 tab(s) orally 2 times a day (27 Apr 2023 12:19)  dorzolamide 2% ophthalmic solution: 1 drop(s) in each eye 2 times a day (27 Apr 2023 12:17)  folic acid 1 mg oral tablet: 1 tab(s) orally once a day (27 Apr 2023 12:19)  latanoprost 0.005% ophthalmic solution: 1 drop(s) in each eye once a day (at bedtime) (27 Apr 2023 12:18)  levothyroxine 75 mcg (0.075 mg) oral tablet: 1 tab(s) orally once a day (27 Apr 2023 12:19)  metoprolol succinate 25 mg oral tablet, extended release: 0.5 tab(s) orally once a day (in the evening) (27 Apr 2023 12:19)  torsemide 20 mg oral tablet: 1 tab(s) orally once a day (27 Apr 2023 12:17)  Verquvo 2.5 mg oral tablet: 1 tab(s) orally once a day (in the evening) (27 Apr 2023 12:19)  warfarin 1 mg oral tablet: 1 tab(s) orally once a day (in the evening) (27 Apr 2023 12:19)    Social History: Unable to obtain as patient is intubated at time of my assessment     FAMILY HISTORY: No pertinent family history in first degree relatives    ROS: Unable to obtain; intubated at time of my assessment     Vital Signs Last 24 Hrs  T(C): 36.9 (29 Apr 2023 19:00), Max: 37.5 (29 Apr 2023 17:45)  T(F): 98.4 (29 Apr 2023 19:00), Max: 99.5 (29 Apr 2023 17:45)  HR: 83 (29 Apr 2023 19:00) (77 - 92)  BP: 94/54 (29 Apr 2023 19:00) (61/31 - 119/103)  BP(mean): 65 (29 Apr 2023 19:00) (42 - 110)  RR: 30 (29 Apr 2023 19:00) (13 - 47)  SpO2: 99% (29 Apr 2023 19:00) (86% - 100%)    Parameters below as of 29 Apr 2023 16:03  Patient On (Oxygen Delivery Method): ventilator    I&O's Summary    28 Apr 2023 07:01  -  29 Apr 2023 07:00  --------------------------------------------------------  IN: 1834.6 mL / OUT: 355 mL / NET: 1479.6 mL    29 Apr 2023 07:01  -  29 Apr 2023 20:40  --------------------------------------------------------  IN: 1395.4 mL / OUT: 150 mL / NET: 1245.4 mL    Physical Exam  General: WDWN female in NAD  HEENT: Intubated/sedated  Cardiac: S1S2 RRR  Respiratory: Transmitted breath sounds b/l  Abdomen: Soft, NT  Extremities: No appreciable edema  Neuro: RASS -5    04-29    136  |  101  |  49.9<H>  ----------------------------<  266<H>  4.5   |  19.0<L>  |  1.95<H>    Ca    8.3<L>      29 Apr 2023 12:00  Phos  5.6     04-29  Mg     1.8     04-29    TPro  4.9<L>  /  Alb  2.6<L>  /  TBili  2.1<H>  /  DBili  x   /  AST  692<H>  /  ALT  865<H>  /  AlkPhos  147<H>  04-29               8.5    29.75 )-----------( 130      ( 29 Apr 2023 16:18 )             25.1     MEDICATIONS  (STANDING):  albuterol/ipratropium for Nebulization 3 milliLiter(s) Nebulizer every 4 hours  buDESOnide    Inhalation Suspension 0.5 milliGRAM(s) Inhalation every 12 hours  cefepime  Injectable. 1000 milliGRAM(s) IV Push every 12 hours  chlorhexidine 0.12% Liquid 15 milliLiter(s) Oral Mucosa every 12 hours  chlorhexidine 2% Cloths 1 Application(s) Topical <User Schedule>  cisatracurium Infusion 2.999 MICROgram(s)/kG/Min (9.05 mL/Hr) IV Continuous <Continuous>  dextrose 5%. 1000 milliLiter(s) (50 mL/Hr) IV Continuous <Continuous>  dextrose 5%. 1000 milliLiter(s) (100 mL/Hr) IV Continuous <Continuous>  dextrose 50% Injectable 25 Gram(s) IV Push once  dextrose 50% Injectable 25 Gram(s) IV Push once  dextrose 50% Injectable 12.5 Gram(s) IV Push once  dorzolamide 2% Ophthalmic Solution 1 Drop(s) Both EYES three times a day  fentaNYL   Infusion. 0.5 MICROgram(s)/kG/Hr (2.52 mL/Hr) IV Continuous <Continuous>  glucagon  Injectable 1 milliGRAM(s) IntraMuscular once  heparin  Infusion. 400 Unit(s)/Hr (4 mL/Hr) IV Continuous <Continuous>  insulin glargine Injectable (LANTUS) 10 Unit(s) SubCutaneous at bedtime  insulin lispro (ADMELOG) corrective regimen sliding scale   SubCutaneous every 6 hours  latanoprost 0.005% Ophthalmic Solution 1 Drop(s) Both EYES at bedtime  levothyroxine Injectable 36 MICROGram(s) IV Push at bedtime  methylPREDNISolone sodium succinate Injectable 60 milliGRAM(s) IV Push every 6 hours  midodrine 10 milliGRAM(s) Oral every 8 hours  norepinephrine Infusion 0.05 MICROgram(s)/kG/Min (2.36 mL/Hr) IV Continuous <Continuous>  pantoprazole  Injectable 40 milliGRAM(s) IV Push two times a day  propofol Infusion 5 MICROgram(s)/kG/Min (1.51 mL/Hr) IV Continuous <Continuous>  sodium bicarbonate  Infusion 0.224 mEq/kG/Hr (75 mL/Hr) IV Continuous <Continuous>  vasopressin Infusion 0.04 Unit(s)/Min (6 mL/Hr) IV Continuous <Continuous>    MEDICATIONS  (PRN):  albuterol    0.083% 2.5 milliGRAM(s) Nebulizer every 2 hours PRN Shortness of Breath and/or Wheezing  dextrose Oral Gel 15 Gram(s) Oral once PRN Blood Glucose LESS THAN 70 milliGRAM(s)/deciliter  heparin   Injectable 2000 Unit(s) IV Push every 6 hours PRN For aPTT between 40 - 57  heparin   Injectable 4000 Unit(s) IV Push every 6 hours PRN For aPTT less than 40     The patient is a 73 year old female with PMH of DM, HTN, Afib, nonischemic dilated cardiomyopathy, HFrEF (EF 15%), s/p ICD, severe MR s/p mechanical MVR, paroxysmal VT, sarcoidosis, and hypothyroidism presents with SOB associated with productive cough. Admitted for acute hypoxic respiratory failure secondary to human metapneumovirus, ultimately requiring intubation on 04/29/2023. Hospital course notable for shock; currently on multiple pressors; transaminitis; and DERIK. Nephrology is consulted for DERIK associated with acid base disturbance; request for evaluation for dialysis.    PAST MEDICAL & SURGICAL HISTORY:  Hypothyroid  Diabetes  HTN (hypertension)  Cataract  Mitral valve disease  s/p MVR  Sarcoid  involving lungs, heart, ears and eyes  Glaucoma  Paroxysmal atrial fibrillation  VT (ventricular tachycardia)  [facility] refused VT ablation  Nonischemic cardiomyopathy  EF <20%; DDDR ICD '99; gen change '03, BiV upgrade '08, gen change '2014  Systolic heart failure  HLD (hyperlipidemia)  Elective surgery  back surgury  H/O mitral valve replacement  ICD (implantable cardioverter-defibrillator) in place  s/p DDDR ICD '99, gen change '03, BiV upgrade '08, gen change 2014    Allergies  No Known Allergies  Intolerances    Home Medications:  amiodarone 200 mg oral tablet: 0.5 tab(s) orally once a day (in the evening) (27 Apr 2023 12:19)  atorvastatin 20 mg oral tablet: 1 tab(s) orally once a day (in the evening) (27 Apr 2023 12:19)  baclofen 20 mg oral tablet: 1 tab(s) orally 2 times a day (27 Apr 2023 12:19)  dorzolamide 2% ophthalmic solution: 1 drop(s) in each eye 2 times a day (27 Apr 2023 12:17)  folic acid 1 mg oral tablet: 1 tab(s) orally once a day (27 Apr 2023 12:19)  latanoprost 0.005% ophthalmic solution: 1 drop(s) in each eye once a day (at bedtime) (27 Apr 2023 12:18)  levothyroxine 75 mcg (0.075 mg) oral tablet: 1 tab(s) orally once a day (27 Apr 2023 12:19)  metoprolol succinate 25 mg oral tablet, extended release: 0.5 tab(s) orally once a day (in the evening) (27 Apr 2023 12:19)  torsemide 20 mg oral tablet: 1 tab(s) orally once a day (27 Apr 2023 12:17)  Verquvo 2.5 mg oral tablet: 1 tab(s) orally once a day (in the evening) (27 Apr 2023 12:19)  warfarin 1 mg oral tablet: 1 tab(s) orally once a day (in the evening) (27 Apr 2023 12:19)    Social History: Unable to obtain as patient is intubated at time of my assessment     FAMILY HISTORY: No pertinent family history in first degree relatives    ROS: Unable to obtain; intubated at time of my assessment     Vital Signs Last 24 Hrs  T(C): 36.9 (29 Apr 2023 19:00), Max: 37.5 (29 Apr 2023 17:45)  T(F): 98.4 (29 Apr 2023 19:00), Max: 99.5 (29 Apr 2023 17:45)  HR: 83 (29 Apr 2023 19:00) (77 - 92)  BP: 94/54 (29 Apr 2023 19:00) (61/31 - 119/103)  BP(mean): 65 (29 Apr 2023 19:00) (42 - 110)  RR: 30 (29 Apr 2023 19:00) (13 - 47)  SpO2: 99% (29 Apr 2023 19:00) (86% - 100%)    Parameters below as of 29 Apr 2023 16:03  Patient On (Oxygen Delivery Method): ventilator    I&O's Summary    28 Apr 2023 07:01  -  29 Apr 2023 07:00  --------------------------------------------------------  IN: 1834.6 mL / OUT: 355 mL / NET: 1479.6 mL    29 Apr 2023 07:01  -  29 Apr 2023 20:40  --------------------------------------------------------  IN: 1395.4 mL / OUT: 150 mL / NET: 1245.4 mL    Physical Exam  General: WDWN female in NAD  HEENT: Intubated/sedated  Cardiac: S1S2 RRR  Respiratory: Transmitted breath sounds b/l  Abdomen: Soft, NT  Extremities: No appreciable edema  Neuro: RASS -5    04-29    136  |  101  |  49.9<H>  ----------------------------<  266<H>  4.5   |  19.0<L>  |  1.95<H>    Ca    8.3<L>      29 Apr 2023 12:00  Phos  5.6     04-29  Mg     1.8     04-29    TPro  4.9<L>  /  Alb  2.6<L>  /  TBili  2.1<H>  /  DBili  x   /  AST  692<H>  /  ALT  865<H>  /  AlkPhos  147<H>  04-29               8.5    29.75 )-----------( 130      ( 29 Apr 2023 16:18 )             25.1     MEDICATIONS  (STANDING):  albuterol/ipratropium for Nebulization 3 milliLiter(s) Nebulizer every 4 hours  buDESOnide    Inhalation Suspension 0.5 milliGRAM(s) Inhalation every 12 hours  cefepime  Injectable. 1000 milliGRAM(s) IV Push every 12 hours  chlorhexidine 0.12% Liquid 15 milliLiter(s) Oral Mucosa every 12 hours  chlorhexidine 2% Cloths 1 Application(s) Topical <User Schedule>  cisatracurium Infusion 2.999 MICROgram(s)/kG/Min (9.05 mL/Hr) IV Continuous <Continuous>  dextrose 5%. 1000 milliLiter(s) (50 mL/Hr) IV Continuous <Continuous>  dextrose 5%. 1000 milliLiter(s) (100 mL/Hr) IV Continuous <Continuous>  dextrose 50% Injectable 25 Gram(s) IV Push once  dextrose 50% Injectable 25 Gram(s) IV Push once  dextrose 50% Injectable 12.5 Gram(s) IV Push once  dorzolamide 2% Ophthalmic Solution 1 Drop(s) Both EYES three times a day  fentaNYL   Infusion. 0.5 MICROgram(s)/kG/Hr (2.52 mL/Hr) IV Continuous <Continuous>  glucagon  Injectable 1 milliGRAM(s) IntraMuscular once  heparin  Infusion. 400 Unit(s)/Hr (4 mL/Hr) IV Continuous <Continuous>  insulin glargine Injectable (LANTUS) 10 Unit(s) SubCutaneous at bedtime  insulin lispro (ADMELOG) corrective regimen sliding scale   SubCutaneous every 6 hours  latanoprost 0.005% Ophthalmic Solution 1 Drop(s) Both EYES at bedtime  levothyroxine Injectable 36 MICROGram(s) IV Push at bedtime  methylPREDNISolone sodium succinate Injectable 60 milliGRAM(s) IV Push every 6 hours  midodrine 10 milliGRAM(s) Oral every 8 hours  norepinephrine Infusion 0.05 MICROgram(s)/kG/Min (2.36 mL/Hr) IV Continuous <Continuous>  pantoprazole  Injectable 40 milliGRAM(s) IV Push two times a day  propofol Infusion 5 MICROgram(s)/kG/Min (1.51 mL/Hr) IV Continuous <Continuous>  sodium bicarbonate  Infusion 0.224 mEq/kG/Hr (75 mL/Hr) IV Continuous <Continuous>  vasopressin Infusion 0.04 Unit(s)/Min (6 mL/Hr) IV Continuous <Continuous>    MEDICATIONS  (PRN):  albuterol    0.083% 2.5 milliGRAM(s) Nebulizer every 2 hours PRN Shortness of Breath and/or Wheezing  dextrose Oral Gel 15 Gram(s) Oral once PRN Blood Glucose LESS THAN 70 milliGRAM(s)/deciliter  heparin   Injectable 2000 Unit(s) IV Push every 6 hours PRN For aPTT between 40 - 57  heparin   Injectable 4000 Unit(s) IV Push every 6 hours PRN For aPTT less than 40

## 2023-04-29 NOTE — PROGRESS NOTE ADULT - ASSESSMENT
73F with pmhx sarcoidosis, pAfib, mechanical MVR on warfarin, HTN, DM2, NICM EF <20% s/p ICD now admitted to ICU with acute hypoxic respiratory failure, failing NIV and requiring intubation this morning in setting of hMPV infection. She is with persistent shock state, septic/cardiogenic and now with subsequent worsening of her metabolic acidosis in setting of oliguric DERIK.     Problem list:   acute hypoxic respiratory failure   hMPV infection   shock, septic/cardiogenic   anemia   shock liver   metabolic encephalopathy   oliguric DERIK     Neuro: sedated with fentanyl/prop. started on nimbex given high peak pressures, titrating for vent synchrony   CV: dual pressor shock with levo/vaso. gave small trials of fluid today (LR 500cc x2) but with persistenly low disatolic blood presures. attempting to wean off vasoactives for goal MAP >65. low dose dobutamine trialed today given oliguria but dc'ed as it did not change her UOP and may have potentiated hypotension   Pulm: failed NIV, intubated this morning. initially with high peak presures, now improved with adding paralytic and changing ventilator mode to pressure control. will repeat ABG stat. remains on 100% fio2, will titrate down as tolerated for goal spO2 >92%.   GI: transfused 1u prbcs this morning for hgb 6.4, repeat now 8.6. protonix changed to ivp bid. no signs of active bleeding.   Renal: worsening DERIK. trialed  and fluids and  as above without improvement. started on bicarb gtt @75cc/hr  ID:   Endo: goal bg 110-180  Heme: DVT ppx with     Dispo: remain in ICU    Family updated at bedside by MICU team        73F with pmhx sarcoidosis, pAfib, mechanical MVR on warfarin, HTN, DM2, NICM EF <20% s/p ICD now admitted to ICU with acute hypoxic respiratory failure, failing NIV and requiring intubation this morning in setting of hMPV infection. She is with persistent shock state, septic/cardiogenic and now with subsequent worsening of her metabolic acidosis in setting of oliguric DERIK.     Problem list:   acute hypoxic respiratory failure   hMPV infection   shock, septic/cardiogenic   anemia   shock liver   metabolic encephalopathy   oliguric DERIK     Neuro: sedated with fentanyl/prop. started on nimbex given high peak pressures, titrating for vent synchrony   CV: dual pressor shock with levo/vaso. gave small trials of fluid today (LR 500cc x2) but with persistently low disatolic blood pressures attempting to wean off vasoactives for goal MAP >65. low dose dobutamine trialed today given oliguria but dc'ed as it did not change her UOP and may have potentiated hypotension. cardiology following   Pulm: failed NIV, intubated this morning. CXR confirmed appropriate ETT placement. initially with high peaks, now improved with adding paralytic and changing ventilator mode to pressure control. will repeat ABG stat. remains on 100% fio2, will titrate down as tolerated for goal spO2 >92%.   GI: transfused 1u prbcs this morning for hgb 6.4, repeat now 8.6. protonix changed to ivp bid. no signs of active bleeding. GI following   Renal: worsening DERIK, started on bicarb gtt @75cc/hr. will repeat BMP tonight and if it reveals worsening acidemia/renal function, would consult nephrology for initiation of HD   ID: on empiric cefepime/azithro. vanc trough elevated, holding for now. ucx with GBS. Bcx NGTD. legionella negative, will dc azithro. continue to de-escalate as cultures allow   Endo: goal bg 110-180, fs q6h while npo, consider initiation of trophic feeds in AM. NGT placed today   Heme: heparin gtt (mechanical valve and pAF)    Dispo: remain in ICU    Family updated at bedside by MICU team     case d/w Dr. Koch, ICU attending

## 2023-04-29 NOTE — PROGRESS NOTE ADULT - SUBJECTIVE AND OBJECTIVE BOX
Chief Complaint: This is a 73y old woman patient being seen in follow-up consultation for elevated liver enzymes    Interval HPI / 24H events:  Patient is seen and examined at the bedside, intubated, with vasopressors and Inotropic agents. Her liver enzymes slowly trending down to AST//931 from 1100/1120 yesterday. Elevated INR 3.36 yesterday.    Review of Systems:    Unable to obtain due to medical condition, intubated, sedated      PAST MEDICAL/SURGICAL HISTORY:  Hypothyroid    Diabetes    HTN (hypertension)    Cataract    Mitral valve disease  s/p MVR    Sarcoid  involving lungs, heart, ears and eyes    Glaucoma    Paroxysmal atrial fibrillation    VT (ventricular tachycardia)  [facility] refused VT ablation    Nonischemic cardiomyopathy  EF <20%; DDDR ICD '99; gen change '03, BiV upgrade '08, gen change '2014    Systolic heart failure    HLD (hyperlipidemia)    Elective surgery  back surgury    H/O mitral valve replacement    ICD (implantable cardioverter-defibrillator) in place  s/p DDDR ICD '99, gen change '03, BiV upgrade '08, gen change 2014      MEDICATIONS  (STANDING):  albuterol/ipratropium for Nebulization 3 milliLiter(s) Nebulizer every 4 hours  azithromycin  IVPB 500 milliGRAM(s) IV Intermittent every 24 hours  azithromycin  IVPB      buDESOnide    Inhalation Suspension 0.5 milliGRAM(s) Inhalation every 12 hours  cefepime  Injectable. 1000 milliGRAM(s) IV Push every 12 hours  chlorhexidine 0.12% Liquid 15 milliLiter(s) Oral Mucosa every 12 hours  chlorhexidine 2% Cloths 1 Application(s) Topical <User Schedule>  cisatracurium Infusion 3 MICROgram(s)/kG/Min (9.05 mL/Hr) IV Continuous <Continuous>  dexMEDEtomidine Infusion 0.3 MICROgram(s)/kG/Hr (3.77 mL/Hr) IV Continuous <Continuous>  dextrose 5%. 1000 milliLiter(s) (50 mL/Hr) IV Continuous <Continuous>  dextrose 5%. 1000 milliLiter(s) (100 mL/Hr) IV Continuous <Continuous>  dextrose 50% Injectable 12.5 Gram(s) IV Push once  dextrose 50% Injectable 25 Gram(s) IV Push once  dextrose 50% Injectable 25 Gram(s) IV Push once  DOBUTamine Infusion 2.5 MICROgram(s)/kG/Min (3.77 mL/Hr) IV Continuous <Continuous>  dorzolamide 2% Ophthalmic Solution 1 Drop(s) Both EYES three times a day  fentaNYL   Infusion. 0.5 MICROgram(s)/kG/Hr (2.52 mL/Hr) IV Continuous <Continuous>  glucagon  Injectable 1 milliGRAM(s) IntraMuscular once  heparin  Infusion. 400 Unit(s)/Hr (4 mL/Hr) IV Continuous <Continuous>  insulin glargine Injectable (LANTUS) 10 Unit(s) SubCutaneous at bedtime  insulin lispro (ADMELOG) corrective regimen sliding scale   SubCutaneous every 6 hours  latanoprost 0.005% Ophthalmic Solution 1 Drop(s) Both EYES at bedtime  levothyroxine Injectable 36 MICROGram(s) IV Push at bedtime  methylPREDNISolone sodium succinate Injectable 60 milliGRAM(s) IV Push every 6 hours  midazolam Injectable 4 milliGRAM(s) IV Push once  midodrine 10 milliGRAM(s) Oral every 8 hours  norepinephrine Infusion 0.05 MICROgram(s)/kG/Min (2.36 mL/Hr) IV Continuous <Continuous>  pantoprazole  Injectable 40 milliGRAM(s) IV Push two times a day  propofol Infusion 5 MICROgram(s)/kG/Min (1.51 mL/Hr) IV Continuous <Continuous>  rocuronium Injectable 100 milliGRAM(s) IV Push once  sodium bicarbonate  Injectable 50 milliEquivalent(s) IV Push once  vancomycin  IVPB 1250 milliGRAM(s) IV Intermittent <User Schedule>  vasopressin Infusion 0.04 Unit(s)/Min (6 mL/Hr) IV Continuous <Continuous>    MEDICATIONS  (PRN):  albuterol    0.083% 2.5 milliGRAM(s) Nebulizer every 2 hours PRN Shortness of Breath and/or Wheezing  dextrose Oral Gel 15 Gram(s) Oral once PRN Blood Glucose LESS THAN 70 milliGRAM(s)/deciliter  heparin   Injectable 4000 Unit(s) IV Push every 6 hours PRN For aPTT less than 40  heparin   Injectable 2000 Unit(s) IV Push every 6 hours PRN For aPTT between 40 - 57    No Known Allergies    T(C): 36.7 (04-29-23 @ 04:00), Max: 37.3 (04-28-23 @ 20:30)  HR: 81 (04-29-23 @ 09:05) (76 - 90)  BP: 91/64 (04-29-23 @ 06:00) (74/44 - 120/94)  RR: 28 (04-29-23 @ 07:00) (17 - 47)  SpO2: 100% (04-29-23 @ 09:05) (90% - 100%)  Mode: AC/ CMV (Assist Control/ Continuous Mandatory Ventilation)  RR (machine): 30  TV (machine): 350  FiO2: 100  PEEP: 6  ITime: 0.8  MAP: 20  PC: 32  PIP: 40    I&O's Summary    28 Apr 2023 07:01  -  29 Apr 2023 07:00  --------------------------------------------------------  IN: 1834.6 mL / OUT: 355 mL / NET: 1479.6 mL      PHYSICAL EXAM:   Constitutional: Intubated, sedated, on vasopressors and Inotrope agents  Neuro: Intubated, sedated,   CV: regular rate, regular rhythm  Pulm/chest: rhonchi bilaterally, Intubated   Abd: soft, +bowel sounds  Skin: no jaundice       LABS:  ABG - ( 29 Apr 2023 09:50 )  pH, Arterial: 7.190 pH, Blood: x     /  pCO2: 50    /  pO2: 99    / HCO3: 19    / Base Excess: -9.1  /  SaO2: 99.5                     6.4    32.16 )-----------( 166      ( 04-29 @ 00:42 )             19.9                7.9    25.95 )-----------( 137      ( 04-28 @ 03:32 )             23.1                9.5    15.06 )-----------( 131      ( 04-27 @ 09:34 )             28.4                9.7    16.66 )-----------( 146      ( 04-27 @ 05:25 )             29.6                10.0   11.09 )-----------( 103      ( 04-26 @ 21:54 )             30.8                10.6   11.98 )-----------( 102      ( 04-26 @ 14:37 )             32.7       04-29    136  |  104  |  41.9<H>  ----------------------------<  162<H>  4.4   |  20.0<L>  |  1.75<H>    Ca    8.0<L>      29 Apr 2023 00:42  Phos  5.4     04-29  Mg     1.9     04-29    TPro  5.3<L>  /  Alb  2.9<L>  /  TBili  1.4  /  DBili  x   /  AST  772<H>  /  ALT  931<H>  /  AlkPhos  151<H>  04-29    LIVER FUNCTIONS - ( 29 Apr 2023 00:42 )  Alb: 2.9 g/dL / Pro: 5.3 g/dL / ALK PHOS: 151 U/L / ALT: 931 U/L / AST: 772 U/L / GGT: x             Lipase, Serum: 29 U/L (04-26-23 @ 14:37)    PT/INR - ( 28 Apr 2023 18:10 )   PT: 39.5 sec;   INR: 3.36 ratio         PTT - ( 29 Apr 2023 03:38 )  PTT:159.6 sec  Iron Total, Serum: 119 ug/dL (04-29-23 @ 00:42)  Iron - Total Binding Capacity.: 163 ug/dL *L* (04-29-23 @ 00:42)  % Saturation, Iron: 73 % *H* (04-29-23 @ 00:42)  Iron Total, Serum: 119 ug/dL (04-29-23 @ 00:42)  Ferritin, Serum: 7117 ng/mL *H* (04-29-23 @ 00:42)  Iron Total, Serum: 119 ug/dL (04-28-23 @ 03:32)  Iron - Total Binding Capacity.: 169 ug/dL *L* (04-28-23 @ 03:32)  % Saturation, Iron: 71 % *H* (04-28-23 @ 03:32)  Ferritin, Serum: 8597 ng/mL *H* (04-28-23 @ 03:32)      Culture - Blood (collected 26 Apr 2023 22:10)  Source: .Blood Blood  Preliminary Report (28 Apr 2023 03:02):    No growth to date.    Culture - Blood (collected 26 Apr 2023 22:09)  Source: .Blood Blood  Preliminary Report (28 Apr 2023 03:02):    No growth to date.    Culture - Urine (collected 26 Apr 2023 21:40)  Source: Clean Catch Clean Catch (Midstream)  Final Report (28 Apr 2023 12:28):    50,000 - 99,000 CFU/mL Streptococcus agalactiae (Group B) isolated    Group B streptococci are susceptible to ampicillin,    penicillin and cefazolin, but may be resistant to    erythromycin and clindamycin.    Recommendations for intrapartum prophylaxis for Group B    streptococci are penicillin or ampicillin.    <10,000 CFU/ml Normal Urogenital massimo present       Chief Complaint: This is a 73y old woman patient being seen in follow-up consultation for elevated liver enzymes    Interval HPI / 24H events:  Patient is seen and examined at the bedside, intubated, with vasopressors and Inotropic agents. Her liver enzymes slowly trending down to AST//931 from 1100/1120 yesterday. Elevated INR 3.36 yesterday.    Review of Systems:    Unable to obtain due to medical condition, intubated, sedated      PAST MEDICAL/SURGICAL HISTORY:  Hypothyroid    Diabetes    HTN (hypertension)    Cataract    Mitral valve disease  s/p MVR    Sarcoid  involving lungs, heart, ears and eyes    Glaucoma    Paroxysmal atrial fibrillation    VT (ventricular tachycardia)   refused VT ablation    Nonischemic cardiomyopathy  EF <20%; DDDR ICD '99; gen change '03, BiV upgrade '08, gen change '2014    Systolic heart failure    HLD (hyperlipidemia)    Elective surgery  back surgury    H/O mitral valve replacement    ICD (implantable cardioverter-defibrillator) in place  s/p DDDR ICD '99, gen change '03, BiV upgrade '08, gen change 2014      MEDICATIONS  (STANDING):  albuterol/ipratropium for Nebulization 3 milliLiter(s) Nebulizer every 4 hours  azithromycin  IVPB 500 milliGRAM(s) IV Intermittent every 24 hours  azithromycin  IVPB      buDESOnide    Inhalation Suspension 0.5 milliGRAM(s) Inhalation every 12 hours  cefepime  Injectable. 1000 milliGRAM(s) IV Push every 12 hours  chlorhexidine 0.12% Liquid 15 milliLiter(s) Oral Mucosa every 12 hours  chlorhexidine 2% Cloths 1 Application(s) Topical <User Schedule>  cisatracurium Infusion 3 MICROgram(s)/kG/Min (9.05 mL/Hr) IV Continuous <Continuous>  dexMEDEtomidine Infusion 0.3 MICROgram(s)/kG/Hr (3.77 mL/Hr) IV Continuous <Continuous>  dextrose 5%. 1000 milliLiter(s) (50 mL/Hr) IV Continuous <Continuous>  dextrose 5%. 1000 milliLiter(s) (100 mL/Hr) IV Continuous <Continuous>  dextrose 50% Injectable 12.5 Gram(s) IV Push once  dextrose 50% Injectable 25 Gram(s) IV Push once  dextrose 50% Injectable 25 Gram(s) IV Push once  DOBUTamine Infusion 2.5 MICROgram(s)/kG/Min (3.77 mL/Hr) IV Continuous <Continuous>  dorzolamide 2% Ophthalmic Solution 1 Drop(s) Both EYES three times a day  fentaNYL   Infusion. 0.5 MICROgram(s)/kG/Hr (2.52 mL/Hr) IV Continuous <Continuous>  glucagon  Injectable 1 milliGRAM(s) IntraMuscular once  heparin  Infusion. 400 Unit(s)/Hr (4 mL/Hr) IV Continuous <Continuous>  insulin glargine Injectable (LANTUS) 10 Unit(s) SubCutaneous at bedtime  insulin lispro (ADMELOG) corrective regimen sliding scale   SubCutaneous every 6 hours  latanoprost 0.005% Ophthalmic Solution 1 Drop(s) Both EYES at bedtime  levothyroxine Injectable 36 MICROGram(s) IV Push at bedtime  methylPREDNISolone sodium succinate Injectable 60 milliGRAM(s) IV Push every 6 hours  midazolam Injectable 4 milliGRAM(s) IV Push once  midodrine 10 milliGRAM(s) Oral every 8 hours  norepinephrine Infusion 0.05 MICROgram(s)/kG/Min (2.36 mL/Hr) IV Continuous <Continuous>  pantoprazole  Injectable 40 milliGRAM(s) IV Push two times a day  propofol Infusion 5 MICROgram(s)/kG/Min (1.51 mL/Hr) IV Continuous <Continuous>  rocuronium Injectable 100 milliGRAM(s) IV Push once  sodium bicarbonate  Injectable 50 milliEquivalent(s) IV Push once  vancomycin  IVPB 1250 milliGRAM(s) IV Intermittent <User Schedule>  vasopressin Infusion 0.04 Unit(s)/Min (6 mL/Hr) IV Continuous <Continuous>    MEDICATIONS  (PRN):  albuterol    0.083% 2.5 milliGRAM(s) Nebulizer every 2 hours PRN Shortness of Breath and/or Wheezing  dextrose Oral Gel 15 Gram(s) Oral once PRN Blood Glucose LESS THAN 70 milliGRAM(s)/deciliter  heparin   Injectable 4000 Unit(s) IV Push every 6 hours PRN For aPTT less than 40  heparin   Injectable 2000 Unit(s) IV Push every 6 hours PRN For aPTT between 40 - 57    No Known Allergies    T(C): 36.7 (04-29-23 @ 04:00), Max: 37.3 (04-28-23 @ 20:30)  HR: 81 (04-29-23 @ 09:05) (76 - 90)  BP: 91/64 (04-29-23 @ 06:00) (74/44 - 120/94)  RR: 28 (04-29-23 @ 07:00) (17 - 47)  SpO2: 100% (04-29-23 @ 09:05) (90% - 100%)  Mode: AC/ CMV (Assist Control/ Continuous Mandatory Ventilation)  RR (machine): 30  TV (machine): 350  FiO2: 100  PEEP: 6  ITime: 0.8  MAP: 20  PC: 32  PIP: 40    I&O's Summary    28 Apr 2023 07:01  -  29 Apr 2023 07:00  --------------------------------------------------------  IN: 1834.6 mL / OUT: 355 mL / NET: 1479.6 mL      PHYSICAL EXAM:   Constitutional: Intubated, sedated, on vasopressors and Inotrope agents  Neuro: Intubated, sedated,   CV: regular rate, regular rhythm  Pulm/chest: rhonchi bilaterally, Intubated   Abd: soft, +bowel sounds  Skin: no jaundice       LABS:  ABG - ( 29 Apr 2023 09:50 )  pH, Arterial: 7.190 pH, Blood: x     /  pCO2: 50    /  pO2: 99    / HCO3: 19    / Base Excess: -9.1  /  SaO2: 99.5                     6.4  no BM per RN  32.16 )-----------( 166      ( 04-29 @ 00:42 )             19.9                7.9    25.95 )-----------( 137      ( 04-28 @ 03:32 )             23.1                9.5    15.06 )-----------( 131      ( 04-27 @ 09:34 )             28.4                9.7    16.66 )-----------( 146      ( 04-27 @ 05:25 )             29.6                10.0   11.09 )-----------( 103      ( 04-26 @ 21:54 )             30.8                10.6   11.98 )-----------( 102      ( 04-26 @ 14:37 )             32.7       04-29    136  |  104  |  41.9<H>  ----------------------------<  162<H>  4.4   |  20.0<L>  |  1.75<H>    Ca    8.0<L>      29 Apr 2023 00:42  Phos  5.4     04-29  Mg     1.9     04-29    TPro  5.3<L>  /  Alb  2.9<L>  /  TBili  1.4  /  DBili  x   /  AST  772<H>  /  ALT  931<H>  /  AlkPhos  151<H>  04-29    LIVER FUNCTIONS - ( 29 Apr 2023 00:42 )  Alb: 2.9 g/dL / Pro: 5.3 g/dL / ALK PHOS: 151 U/L / ALT: 931 U/L / AST: 772 U/L / GGT: x             Lipase, Serum: 29 U/L (04-26-23 @ 14:37)    PT/INR - ( 28 Apr 2023 18:10 )   PT: 39.5 sec;   INR: 3.36 ratio         PTT - ( 29 Apr 2023 03:38 )  PTT:159.6 sec  Iron Total, Serum: 119 ug/dL (04-29-23 @ 00:42)  Iron - Total Binding Capacity.: 163 ug/dL *L* (04-29-23 @ 00:42)  % Saturation, Iron: 73 % *H* (04-29-23 @ 00:42)  Iron Total, Serum: 119 ug/dL (04-29-23 @ 00:42)  Ferritin, Serum: 7117 ng/mL *H* (04-29-23 @ 00:42)  Iron Total, Serum: 119 ug/dL (04-28-23 @ 03:32)  Iron - Total Binding Capacity.: 169 ug/dL *L* (04-28-23 @ 03:32)  % Saturation, Iron: 71 % *H* (04-28-23 @ 03:32)  Ferritin, Serum: 8597 ng/mL *H* (04-28-23 @ 03:32)      Culture - Blood (collected 26 Apr 2023 22:10)  Source: .Blood Blood  Preliminary Report (28 Apr 2023 03:02):    No growth to date.    Culture - Blood (collected 26 Apr 2023 22:09)  Source: .Blood Blood  Preliminary Report (28 Apr 2023 03:02):    No growth to date.    Culture - Urine (collected 26 Apr 2023 21:40)  Source: Clean Catch Clean Catch (Midstream)  Final Report (28 Apr 2023 12:28):    50,000 - 99,000 CFU/mL Streptococcus agalactiae (Group B) isolated    Group B streptococci are susceptible to ampicillin,    penicillin and cefazolin, but may be resistant to    erythromycin and clindamycin.    Recommendations for intrapartum prophylaxis for Group B    streptococci are penicillin or ampicillin.    <10,000 CFU/ml Normal Urogenital massimo present

## 2023-04-29 NOTE — CHART NOTE - NSCHARTNOTEFT_GEN_A_CORE
Pt noted with worsening metabolic acidosis despite being on bicarb infusion. Likely due to sepsis, acute renal failure, septic ATN. Dr. Lucio nephrology consulted. Consent for vascular catheter was obtained from daughter.    Danny Koch M.D.  , Pulmonary & Critical Care Medicine  [facility]  Pulmonary and [facility] Medicine at [place]  [address]  [place] [address]  T: [phone]  F: [phone]

## 2023-04-29 NOTE — CONSULT NOTE ADULT - CONSULT REASON
Anemia
Elevated LFTs
Viral pneumonia, sepsis, acute on chronic respiratory failure
r/o acute cholecystitis
DERIK, evaluation for dialysis
Heart Failure

## 2023-04-29 NOTE — GOALS OF CARE CONVERSATION - ADVANCED CARE PLANNING - CONVERSATION DETAILS
Patient has escalating pressor requirement, on levo and vasopressin.  PF ratio < 100, continue to desaturate to 80s despite 8 of peep and 100% of FIO2 with plateau pressure in the high 30s.  Liver enzymes went back up, lactate up trended to 7,  minimal urine.  Overall patient in multiorgan failure.  Discussed with the entire family (10+ people), along with the  and two daughters, that the prognosis at this time is very grim, chance of dying in the hospital is very high.  Discussed that if at this time her heart stop, CPR is very unlikely to change the overall outcome, and will only cause pain with the fracture,  family agrees, and at this time will pursue with DNR.      Reassure to family that we will continue full critical care.  In regard to HD, explain that are potential risk for HD as well, given patient on multiple pressors.  After discussion, family wants to pursue with dialysis.  Will proceed with [facility].

## 2023-04-29 NOTE — PROGRESS NOTE ADULT - SUBJECTIVE AND OBJECTIVE BOX
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 INitially admitted to medicine but upgraded to ICU after RRT shortly after admission for tachypnea and hypotension requiring initiation of IV vasopressors and bipap. She was admitted to ICU with acute hypoxic respiratory failure and shock state, septic/cardiogenic. Shock state worsened requiring initiation of levo and vaso.     Events last 24 hours: Has remained on bipap but ultimately her respiratory status worsened and she was intubated this morning. She remained with high peak pressures. Given Rocuronium 100mg ivp x1 post-intubation and started on nimbex. Vent changed to pressure control.     ROS:   Due to altered mental status/intubation, subjective information was not able to be obtained from the patient. History was obtained, to the extent possible, from review of the chart and collateral sources of information.    PMHx:   PSHx:  FamHx:   Social Hx:   Allergies:    PAST MEDICAL & SURGICAL HISTORY:  Hypothyroid      Diabetes      HTN (hypertension)      Cataract      Mitral valve disease  s/p MVR      Sarcoid  involving lungs, heart, ears and eyes      Glaucoma      Paroxysmal atrial fibrillation      VT (ventricular tachycardia)  [facility] refused VT ablation      Nonischemic cardiomyopathy  EF <20%; DDDR ICD '99; gen change '03, BiV upgrade '08, gen change '2014      Systolic heart failure      HLD (hyperlipidemia)      Elective surgery  back surgury      H/O mitral valve replacement      ICD (implantable cardioverter-defibrillator) in place  s/p DDDR ICD '99, gen change '03, BiV upgrade '08, gen change 2014          SOCIAL HISTORY:  EtOH:   Smoking:   Recreational drug use:     Medications:  azithromycin  IVPB 500 milliGRAM(s) IV Intermittent every 24 hours  azithromycin  IVPB      cefepime  Injectable. 1000 milliGRAM(s) IV Push every 12 hours    DOBUTamine Infusion 2.5 MICROgram(s)/kG/Min IV Continuous <Continuous>  midodrine 10 milliGRAM(s) Oral every 8 hours  norepinephrine Infusion 0.05 MICROgram(s)/kG/Min IV Continuous <Continuous>    albuterol    0.083% 2.5 milliGRAM(s) Nebulizer every 2 hours PRN  albuterol/ipratropium for Nebulization 3 milliLiter(s) Nebulizer every 4 hours  buDESOnide    Inhalation Suspension 0.5 milliGRAM(s) Inhalation every 12 hours    cisatracurium Infusion 2.999 MICROgram(s)/kG/Min IV Continuous <Continuous>  dexMEDEtomidine Infusion 0.3 MICROgram(s)/kG/Hr IV Continuous <Continuous>  fentaNYL   Infusion. 0.5 MICROgram(s)/kG/Hr IV Continuous <Continuous>  propofol Infusion 5 MICROgram(s)/kG/Min IV Continuous <Continuous>      heparin   Injectable 2000 Unit(s) IV Push every 6 hours PRN  heparin   Injectable 4000 Unit(s) IV Push every 6 hours PRN  heparin  Infusion. 400 Unit(s)/Hr IV Continuous <Continuous>    pantoprazole  Injectable 40 milliGRAM(s) IV Push two times a day      dextrose 50% Injectable 25 Gram(s) IV Push once  dextrose 50% Injectable 12.5 Gram(s) IV Push once  dextrose 50% Injectable 25 Gram(s) IV Push once  dextrose Oral Gel 15 Gram(s) Oral once PRN  glucagon  Injectable 1 milliGRAM(s) IntraMuscular once  insulin glargine Injectable (LANTUS) 10 Unit(s) SubCutaneous at bedtime  insulin lispro (ADMELOG) corrective regimen sliding scale   SubCutaneous every 6 hours  levothyroxine Injectable 36 MICROGram(s) IV Push at bedtime  methylPREDNISolone sodium succinate Injectable 60 milliGRAM(s) IV Push every 6 hours  vasopressin Infusion 0.04 Unit(s)/Min IV Continuous <Continuous>    dextrose 5%. 1000 milliLiter(s) IV Continuous <Continuous>  dextrose 5%. 1000 milliLiter(s) IV Continuous <Continuous>  sodium bicarbonate  Infusion 0.224 mEq/kG/Hr IV Continuous <Continuous>      chlorhexidine 0.12% Liquid 15 milliLiter(s) Oral Mucosa every 12 hours  chlorhexidine 2% Cloths 1 Application(s) Topical <User Schedule>  dorzolamide 2% Ophthalmic Solution 1 Drop(s) Both EYES three times a day  latanoprost 0.005% Ophthalmic Solution 1 Drop(s) Both EYES at bedtime        Mode: AC/ CMV (Assist Control/ Continuous Mandatory Ventilation)  RR (machine): 30  TV (machine): 350  FiO2: 100  PEEP: 5  ITime: 0.6  MAP: 16  PC: 33  PIP: 39      ICU Vital Signs Last 24 Hrs  T(C): 36 (29 Apr 2023 12:00), Max: 37.3 (28 Apr 2023 20:30)  T(F): 96.8 (29 Apr 2023 12:00), Max: 99.1 (28 Apr 2023 20:30)  HR: 85 (29 Apr 2023 16:03) (76 - 91)  BP: 89/46 (29 Apr 2023 14:00) (61/31 - 120/94)  BP(mean): 58 (29 Apr 2023 14:00) (42 - 110)  ABP: 112/36 (29 Apr 2023 14:00) (98/35 - 151/73)  ABP(mean): 52 (29 Apr 2023 14:00) (-10 - 95)  RR: 30 (29 Apr 2023 14:00) (13 - 47)  SpO2: 100% (29 Apr 2023 16:03) (86% - 100%)    O2 Parameters below as of 29 Apr 2023 16:03  Patient On (Oxygen Delivery Method): ventilator            Physical Examination:    General: Laying in bed comfortably in no acute distress  HEENT: Pupils equal, reactive to light.  Symmetric.  PULM: Clear to auscultation bilaterally, unlabored respirations on room air   CVS: Regular rate and rhythm, no murmurs, rubs, or gallops  ABD: Soft, nondistended, nontender, normoactive bowel sounds, no masses  EXT: No edema, nontender  SKIN: Warm and well perfused  NEURO: interactive, nonfocal     ABG - ( 29 Apr 2023 11:45 )  pH, Arterial: 7.290 pH, Blood: x     /  pCO2: 41    /  pO2: 107   / HCO3: 20    / Base Excess: -6.9  /  SaO2: 99.8                I&O's Detail    28 Apr 2023 07:01  -  29 Apr 2023 07:00  --------------------------------------------------------  IN:    Dexmedetomidine: 299.8 mL    Heparin Infusion: 73 mL    IV PiggyBack: 250 mL    IV PiggyBack: 250 mL    Norepinephrine: 512.3 mL    Norepinephrine: 305.5 mL    Vasopressin: 144 mL  Total IN: 1834.6 mL    OUT:    Indwelling Catheter - Urethral (mL): 355 mL  Total OUT: 355 mL    Total NET: 1479.6 mL      29 Apr 2023 07:01  -  29 Apr 2023 16:06  --------------------------------------------------------  IN:  Total IN: 0 mL    OUT:    Indwelling Catheter - Urethral (mL): 120 mL  Total OUT: 120 mL    Total NET: -120 mL          LABS:                        8.6    23.15 )-----------( 105      ( 29 Apr 2023 12:00 )             25.5     04-29    136  |  101  |  49.9<H>  ----------------------------<  266<H>  4.5   |  19.0<L>  |  1.95<H>    Ca    8.3<L>      29 Apr 2023 12:00  Phos  5.6     04-29  Mg     1.8     04-29    TPro  4.9<L>  /  Alb  2.6<L>  /  TBili  2.1<H>  /  DBili  x   /  AST  692<H>  /  ALT  865<H>  /  AlkPhos  147<H>  04-29          CAPILLARY BLOOD GLUCOSE      POCT Blood Glucose.: 275 mg/dL (29 Apr 2023 11:47)    PT/INR - ( 28 Apr 2023 18:10 )   PT: 39.5 sec;   INR: 3.36 ratio         PTT - ( 29 Apr 2023 03:38 )  PTT:159.6 sec    CULTURES:  Culture Results:   No growth to date. (04-26 @ 22:10)  Culture Results:   No growth to date. (04-26 @ 22:09)  Culture Results:   50,000 - 99,000 CFU/mL Streptococcus agalactiae (Group B) isolated  Group B streptococci are susceptible to ampicillin,  penicillin and cefazolin, but may be resistant to  erythromycin and clindamycin.  Recommendations for intrapartum prophylaxis for Group B  streptococci are penicillin or ampicillin.  <10,000 CFU/ml Normal Urogenital massimo present (04-26 @ 21:40)  Rapid RVP Result: Detected (04-26 @ 14:37)        RADIOLOGY: ***      INVASIVE LINES:  INDWELLING GAUTAM:  VTE PROPHYLAXIS:  CODE STATUS:        CRITICAL CARE TIME SPENT: *** minutes spent performing frequent bedside reassessments and augmenting plan of care to address problems of acute critical illness that pose high probability of life threatening deterioration and/or end organ damage/dysfunction and discussing goals of care, non-inclusive of time spent on procedures performed. 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 INitially admitted to medicine but upgraded to ICU after RRT shortly after admission for tachypnea and hypotension requiring initiation of IV vasopressors and bipap. She was admitted to ICU with acute hypoxic respiratory failure and shock state, septic/cardiogenic. Shock state worsened requiring initiation of levo and vaso.     Events last 24 hours: Has remained on bipap but ultimately her respiratory status worsened and she was intubated this morning. She remained with high peak pressures. Given Rocuronium 100mg ivp x1 post-intubation and started on nimbex. Vent changed to pressure control. Persistently oliguric, bicarb gtt added. Remains in dual pressor shock state.     ROS:   Due to altered mental status/intubation, subjective information was not able to be obtained from the patient. History was obtained, to the extent possible, from review of the chart and collateral sources of information.      PAST MEDICAL & SURGICAL HISTORY:  Hypothyroid  Diabetes  HTN (hypertension)  Cataract  Mitral valve disease  s/p MVR  Sarcoid  involving lungs, heart, ears and eyes  Glaucoma  Paroxysmal atrial fibrillation  VT (ventricular tachycardia)  [facility] refused VT ablation  Nonischemic cardiomyopathy  EF <20%; DDDR ICD '99; gen change '03, BiV upgrade '08, gen change '2014  Systolic heart failure  HLD (hyperlipidemia)  Elective surgery  back surgury  H/O mitral valve replacement  ICD (implantable cardioverter-defibrillator) in place  s/p DDDR ICD '99, gen change '03, BiV upgrade '08, gen change 2014    Medications:  azithromycin  IVPB 500 milliGRAM(s) IV Intermittent every 24 hours  azithromycin  IVPB      cefepime  Injectable. 1000 milliGRAM(s) IV Push every 12 hours    DOBUTamine Infusion 2.5 MICROgram(s)/kG/Min IV Continuous <Continuous>  midodrine 10 milliGRAM(s) Oral every 8 hours  norepinephrine Infusion 0.05 MICROgram(s)/kG/Min IV Continuous <Continuous>    albuterol    0.083% 2.5 milliGRAM(s) Nebulizer every 2 hours PRN  albuterol/ipratropium for Nebulization 3 milliLiter(s) Nebulizer every 4 hours  buDESOnide    Inhalation Suspension 0.5 milliGRAM(s) Inhalation every 12 hours    cisatracurium Infusion 2.999 MICROgram(s)/kG/Min IV Continuous <Continuous>  dexMEDEtomidine Infusion 0.3 MICROgram(s)/kG/Hr IV Continuous <Continuous>  fentaNYL   Infusion. 0.5 MICROgram(s)/kG/Hr IV Continuous <Continuous>  propofol Infusion 5 MICROgram(s)/kG/Min IV Continuous <Continuous>      heparin   Injectable 2000 Unit(s) IV Push every 6 hours PRN  heparin   Injectable 4000 Unit(s) IV Push every 6 hours PRN  heparin  Infusion. 400 Unit(s)/Hr IV Continuous <Continuous>    pantoprazole  Injectable 40 milliGRAM(s) IV Push two times a day      dextrose 50% Injectable 25 Gram(s) IV Push once  dextrose 50% Injectable 12.5 Gram(s) IV Push once  dextrose 50% Injectable 25 Gram(s) IV Push once  dextrose Oral Gel 15 Gram(s) Oral once PRN  glucagon  Injectable 1 milliGRAM(s) IntraMuscular once  insulin glargine Injectable (LANTUS) 10 Unit(s) SubCutaneous at bedtime  insulin lispro (ADMELOG) corrective regimen sliding scale   SubCutaneous every 6 hours  levothyroxine Injectable 36 MICROGram(s) IV Push at bedtime  methylPREDNISolone sodium succinate Injectable 60 milliGRAM(s) IV Push every 6 hours  vasopressin Infusion 0.04 Unit(s)/Min IV Continuous <Continuous>    dextrose 5%. 1000 milliLiter(s) IV Continuous <Continuous>  dextrose 5%. 1000 milliLiter(s) IV Continuous <Continuous>  sodium bicarbonate  Infusion 0.224 mEq/kG/Hr IV Continuous <Continuous>      chlorhexidine 0.12% Liquid 15 milliLiter(s) Oral Mucosa every 12 hours  chlorhexidine 2% Cloths 1 Application(s) Topical <User Schedule>  dorzolamide 2% Ophthalmic Solution 1 Drop(s) Both EYES three times a day  latanoprost 0.005% Ophthalmic Solution 1 Drop(s) Both EYES at bedtime        Mode: AC/ CMV (Assist Control/ Continuous Mandatory Ventilation)  RR (machine): 30  TV (machine): 350  FiO2: 100  PEEP: 5  ITime: 0.6  MAP: 16  PC: 33  PIP: 39      ICU Vital Signs Last 24 Hrs  T(C): 36 (29 Apr 2023 12:00), Max: 37.3 (28 Apr 2023 20:30)  T(F): 96.8 (29 Apr 2023 12:00), Max: 99.1 (28 Apr 2023 20:30)  HR: 85 (29 Apr 2023 16:03) (76 - 91)  BP: 89/46 (29 Apr 2023 14:00) (61/31 - 120/94)  BP(mean): 58 (29 Apr 2023 14:00) (42 - 110)  ABP: 112/36 (29 Apr 2023 14:00) (98/35 - 151/73)  ABP(mean): 52 (29 Apr 2023 14:00) (-10 - 95)  RR: 30 (29 Apr 2023 14:00) (13 - 47)  SpO2: 100% (29 Apr 2023 16:03) (86% - 100%)    O2 Parameters below as of 29 Apr 2023 16:03  Patient On (Oxygen Delivery Method): ventilator      Physical Examination:    General: sedated   HEENT: Pupils equal, reactive to light.  Symmetric.  PULM: rhonchi b/l, intubated   CVS: Regular rate and rhythm  ABD: Soft, nondistended, nontender  EXT: No edema, nontender  SKIN: Warm and well perfused  NEURO: sedated     ABG - ( 29 Apr 2023 11:45 )  pH, Arterial: 7.290 pH, Blood: x     /  pCO2: 41    /  pO2: 107   / HCO3: 20    / Base Excess: -6.9  /  SaO2: 99.8        I&O's Detail    28 Apr 2023 07:01  -  29 Apr 2023 07:00  --------------------------------------------------------  IN:    Dexmedetomidine: 299.8 mL    Heparin Infusion: 73 mL    IV PiggyBack: 250 mL    IV PiggyBack: 250 mL    Norepinephrine: 512.3 mL    Norepinephrine: 305.5 mL    Vasopressin: 144 mL  Total IN: 1834.6 mL    OUT:    Indwelling Catheter - Urethral (mL): 355 mL  Total OUT: 355 mL    Total NET: 1479.6 mL      29 Apr 2023 07:01  -  29 Apr 2023 16:06  --------------------------------------------------------  IN:  Total IN: 0 mL    OUT:    Indwelling Catheter - Urethral (mL): 120 mL  Total OUT: 120 mL    Total NET: -120 mL          LABS:                        8.6    23.15 )-----------( 105      ( 29 Apr 2023 12:00 )             25.5     04-29    136  |  101  |  49.9<H>  ----------------------------<  266<H>  4.5   |  19.0<L>  |  1.95<H>    Ca    8.3<L>      29 Apr 2023 12:00  Phos  5.6     04-29  Mg     1.8     04-29    TPro  4.9<L>  /  Alb  2.6<L>  /  TBili  2.1<H>  /  DBili  x   /  AST  692<H>  /  ALT  865<H>  /  AlkPhos  147<H>  04-29          CAPILLARY BLOOD GLUCOSE      POCT Blood Glucose.: 275 mg/dL (29 Apr 2023 11:47)    PT/INR - ( 28 Apr 2023 18:10 )   PT: 39.5 sec;   INR: 3.36 ratio         PTT - ( 29 Apr 2023 03:38 )  PTT:159.6 sec    CULTURES:  Culture Results:   No growth to date. (04-26 @ 22:10)  Culture Results:   No growth to date. (04-26 @ 22:09)  Culture Results:   50,000 - 99,000 CFU/mL Streptococcus agalactiae (Group B) isolated  Group B streptococci are susceptible to ampicillin,  penicillin and cefazolin, but may be resistant to  erythromycin and clindamycin.  Recommendations for intrapartum prophylaxis for Group B  streptococci are penicillin or ampicillin.  <10,000 CFU/ml Normal Urogenital massimo present (04-26 @ 21:40)  Rapid RVP Result: Detected (04-26 @ 14:37)        RADIOLOGY: < from: Xray Chest 1 View- PORTABLE-Routine (Xray Chest 1 View- PORTABLE-Routine .) (04.28.23 @ 20:45) >  INTERPRETATION:  Exam:XR CHEST    clinical history:pneumonia, CHF    Worsening bilateral airspace disease. Stable calcified nodes. No   pneumothorax    IMPRESSION: Worsening bilateral airspace disease    --- End of Report ---        < end of copied text >        INVASIVE LINES: art line, femoral TLC   INDWELLING GAUTAM: Y   VTE PROPHYLAXIS: heparin gtt   CODE STATUS: full       CRITICAL CARE TIME SPENT: 100 minutes spent performing frequent bedside reassessments and augmenting plan of care to address problems of acute critical illness that pose high probability of life threatening deterioration and/or end organ damage/dysfunction and discussing goals of care, non-inclusive of time spent on procedures performed.

## 2023-04-29 NOTE — PROGRESS NOTE ADULT - NS ATTEND AMEND GEN_ALL_CORE FT
I evaluated this pt. with my ACP and agree with the above assessment and management plan. Pt's abnormal LAES are improving while her respiratory status remains tenuous. Continue to trend LAES daily. Await liver disease serologies that were ordered yesterday. Hepatitis B and C profiles are negative. Pt' likely has resolving ischemic hepatopathy secondary to periods of hypotension. She remains on norepinephrine infusion for BP support and BIPAP for her respiratory status. Repeat CMP and coags ordered for the AM. U/S results from yesterday noted. Pt. clinically does not have cholecystitis. Surgical consult appreciated.
72 y/o female with PMHx of Afib-coumadin, HTN, sarcoid, DM ?, hypothyroidism, cardiomyopathy , CHF, EF less than 20  % per family, s/p MVR mechanical,  ICD, presents c/o SOB and wheeze x 2 days, coughing with yellow sputum. Patient was found to be +hMVP. GI following after transaminitis. Intubated, on pressors, paralyzed due to high PEEP on vent. Not making urine. Rising WBC  LAEs downtrending, likely shock liver from general instability +/- amiodorone which has been subsequently held.   Hepatitis serologies -ve.   Hb downtrend noted, but no overt GIB, might be somewhat dilutional in the setting of all the drips she is also getting.

## 2023-04-29 NOTE — CONSULT NOTE ADULT - CONSULT REQUESTED DATE/TIME
29-Apr-2023 18:45
26-Apr-2023 22:35
26-Apr-2023 22:46
27-Apr-2023
27-Apr-2023 07:46
28-Apr-2023 09:30

## 2023-04-29 NOTE — PROGRESS NOTE ADULT - ASSESSMENT
Patient is a 72 y/o female with PMHx of Afib-coumadin, HTN, sarcoid, DM ? per pt's daughter not on any meds as not Diabetic ? hypothyroidism, cardiomyopathy , CHF, EF less than 20  % per family, s/p MVR mechanical,  ICD, presents c/o SOB and wheeze x 2 days, coughing with yellow sputum. Patient was found to be +hMVP. GI following after transaminitis.

## 2023-04-29 NOTE — CHART NOTE - NSCHARTNOTEFT_GEN_A_CORE
Dr Simeon and I on separate occasions both expressed to the patient's daughters (one at bedside, one via telephone) about their mother's progressive decline in clinical condition. Over the past day the patient has become more acidotic with progressively increasing WOB despite continuous BiPAP use. pH on 4/27 was 7.450, this morning 7.230. Despite 100% FiO2 on the BiPAP, patient's PaO2 remains 80, hypoxia s/t ongoing viral PNA confounded by underlying restrictive lung dz from sarcoidosis.     We explained that despite maximal ventilatory and oxygenation support via BiPAP the increased WOB with RR high 20s / low 30s is not sustainable and patient would benefit from mechanical ventilatory support via intubation. Both daughters expressed wishes to delay intubation as their father is en route to the hospital and would like visit her before intubation transpires; family is concerned about her not being able to be extubated given her underlying sarcoidosis and would like to see her before intubation if clinical condition warrants and does not worsen before their arrival. All questions answered, emotional support provided.

## 2023-04-29 NOTE — PROCEDURE NOTE - NSPOSTPRCRAD_GEN_A_CORE
right femoral vein/central line located in the
chest radiograph/feeding tube in correct gastric position

## 2023-04-30 NOTE — PROGRESS NOTE ADULT - SUBJECTIVE AND OBJECTIVE BOX
Remains critically ill - on mechanical ventilation + paralytic agent + multiple pressors (vasopressin + levophed).    Vital Signs Last 24 Hrs  T(C): 37.2 (30 Apr 2023 09:15), Max: 37.5 (29 Apr 2023 17:45)  T(F): 99 (30 Apr 2023 09:15), Max: 99.5 (29 Apr 2023 17:45)  HR: 78 (30 Apr 2023 09:15) (75 - 92)  BP: 77/34 (30 Apr 2023 09:00) (76/55 - 99/65)  BP(mean): 46 (30 Apr 2023 09:00) (46 - 74)  RR: 30 (30 Apr 2023 09:15) (16 - 31)  SpO2: 98% (30 Apr 2023 09:15) (82% - 100%)    Parameters below as of 30 Apr 2023 08:00  Patient On (Oxygen Delivery Method): ventilator    O2 Concentration (%): 90    I&O's Summary    29 Apr 2023 07:01  -  30 Apr 2023 07:00  --------------------------------------------------------  IN: 3393.4 mL / OUT: 745 mL / NET: 2648.4 mL    30 Apr 2023 07:01  -  30 Apr 2023 09:25  --------------------------------------------------------  IN: 180.5 mL / OUT: 180 mL / NET: 0.5 mL    04-30    139  |  102  |  39.4<H>  ----------------------------<  159<H>  4.1   |  25.0  |  1.75<H>    Ca    7.8<L>      30 Apr 2023 08:25    Phos  5.2     04-30  Mg     2.0     04-30    TPro  5.2<L>  /  Alb  2.7<L>  /  TBili  3.1<H>  /  DBili  x   /  AST  x   /  ALT  x   /  AlkPhos  171<H>  04-30               8.6    32.28 )-----------( 112      ( 30 Apr 2023 08:25 )             25.2    Remains critically ill - on mechanical ventilation + paralytic agent + multiple pressors (vasopressin + levophed).    Vital Signs Last 24 Hrs  T(C): 37.2 (30 Apr 2023 09:15), Max: 37.5 (29 Apr 2023 17:45)  T(F): 99 (30 Apr 2023 09:15), Max: 99.5 (29 Apr 2023 17:45)  HR: 78 (30 Apr 2023 09:15) (75 - 92)  BP: 77/34 (30 Apr 2023 09:00) (76/55 - 99/65)  BP(mean): 46 (30 Apr 2023 09:00) (46 - 74)  RR: 30 (30 Apr 2023 09:15) (16 - 31)  SpO2: 98% (30 Apr 2023 09:15) (82% - 100%)    Parameters below as of 30 Apr 2023 08:00  Patient On (Oxygen Delivery Method): ventilator    O2 Concentration (%): 90    I&O's Summary    29 Apr 2023 07:01  -  30 Apr 2023 07:00  --------------------------------------------------------  IN: 3393.4 mL / OUT: 745 mL / NET: 2648.4 mL    30 Apr 2023 07:01  -  30 Apr 2023 09:25  --------------------------------------------------------  IN: 180.5 mL / OUT: 180 mL / NET: 0.5 mL    Physical Exam  General: WDWN female in NAD  HEENT: Intubated/sedated  Cardiac: S1S2 RRR  Respiratory: Expiratory wheezes b/l   Abdomen: Soft, NT  Extremities: Pedal edema  : +Mark   Neuro: RASS -5    04-30    139  |  102  |  39.4<H>  ----------------------------<  159<H>  4.1   |  25.0  |  1.75<H>    Ca    7.8<L>      30 Apr 2023 08:25    Phos  5.2     04-30  Mg     2.0     04-30    TPro  5.2<L>  /  Alb  2.7<L>  /  TBili  3.1<H>  /  DBili  x   /  AST  x   /  ALT  x   /  AlkPhos  171<H>  04-30               8.6    32.28 )-----------( 112      ( 30 Apr 2023 08:25 )             25.2     MEDICATIONS  (STANDING):  albuterol/ipratropium for Nebulization 3 milliLiter(s) Nebulizer every 4 hours  buDESOnide    Inhalation Suspension 0.5 milliGRAM(s) Inhalation every 12 hours  cefepime  Injectable. 1000 milliGRAM(s) IV Push every 12 hours  chlorhexidine 0.12% Liquid 15 milliLiter(s) Oral Mucosa every 12 hours  chlorhexidine 2% Cloths 1 Application(s) Topical <User Schedule>  cisatracurium Infusion 2.999 MICROgram(s)/kG/Min (9.05 mL/Hr) IV Continuous <Continuous>  CRRT Treatment    <Continuous>  dextrose 5%. 1000 milliLiter(s) (50 mL/Hr) IV Continuous <Continuous>  dextrose 5%. 1000 milliLiter(s) (100 mL/Hr) IV Continuous <Continuous>  dextrose 50% Injectable 25 Gram(s) IV Push once  dextrose 50% Injectable 12.5 Gram(s) IV Push once  dextrose 50% Injectable 25 Gram(s) IV Push once  dorzolamide 2% Ophthalmic Solution 1 Drop(s) Both EYES three times a day  fentaNYL   Infusion. 0.5 MICROgram(s)/kG/Hr (2.52 mL/Hr) IV Continuous <Continuous>  glucagon  Injectable 1 milliGRAM(s) IntraMuscular once  heparin  Infusion. 400 Unit(s)/Hr (4 mL/Hr) IV Continuous <Continuous>  insulin glargine Injectable (LANTUS) 10 Unit(s) SubCutaneous at bedtime  insulin lispro (ADMELOG) corrective regimen sliding scale   SubCutaneous every 6 hours  latanoprost 0.005% Ophthalmic Solution 1 Drop(s) Both EYES at bedtime  levothyroxine Injectable 36 MICROGram(s) IV Push at bedtime  methylPREDNISolone sodium succinate Injectable 60 milliGRAM(s) IV Push every 6 hours  midodrine 10 milliGRAM(s) Oral every 8 hours  norepinephrine Infusion 0.05 MICROgram(s)/kG/Min (2.36 mL/Hr) IV Continuous <Continuous>  pantoprazole  Injectable 40 milliGRAM(s) IV Push two times a day  Phoxillum Filtration BK 4 / 2.5 5000 milliLiter(s) (400 mL/Hr) CRRT <Continuous>  Phoxillum Filtration BK 4 / 2.5 5000 milliLiter(s) (400 mL/Hr) CRRT <Continuous>  Phoxillum Filtration BK 4 / 2.5 5000 milliLiter(s) (800 mL/Hr) CRRT <Continuous>  propofol Infusion 5 MICROgram(s)/kG/Min (1.51 mL/Hr) IV Continuous <Continuous>  sodium bicarbonate  Infusion 0.224 mEq/kG/Hr (75 mL/Hr) IV Continuous <Continuous>  vasopressin Infusion 0.04 Unit(s)/Min (6 mL/Hr) IV Continuous <Continuous>    MEDICATIONS  (PRN):  albuterol    0.083% 2.5 milliGRAM(s) Nebulizer every 2 hours PRN Shortness of Breath and/or Wheezing  dextrose Oral Gel 15 Gram(s) Oral once PRN Blood Glucose LESS THAN 70 milliGRAM(s)/deciliter  heparin   Injectable 2000 Unit(s) IV Push every 6 hours PRN For aPTT between 40 - 57  heparin   Injectable 4000 Unit(s) IV Push every 6 hours PRN For aPTT less than 40

## 2023-04-30 NOTE — PROCEDURE NOTE - NSINDICATIONS_GEN_A_CORE
critical patient/monitoring purposes
feeding tube replacement
cardiac arrest/respiratory failure
vasopressors/critical illness/hypertonic/irritant infusion
dialysis/CRRT

## 2023-04-30 NOTE — PROGRESS NOTE ADULT - SUBJECTIVE AND OBJECTIVE BOX
Patient is a 72 y/o female who presents with a chief complaint of acute hypoxic respiratory failure (29 Apr 2023 20:08)    BRIEF HOSPITAL COURSE: 72 y/o F with PMHx of sarcoidosis, NICM (LVEF < 20%), mechanical MVR on warfarin, paroxsymal a fib, and recent exposure to  who has HMPV who presented on 4/26 complaining of shortness pf breathing, wheezing, and productive cough x 2 days. MICU consulted as patient exhibited persistent dyspnea for several hours and began to develop hypotension (SBP 60s). She tested positive for HMPV. Ultimately developed mixed septic/cardiogenic shock state with MSOF requiring initiation of dual IV vasopressor therapy. Also started on NIPPV for work of breathing.    Events last 24 hours: Failed NIPPV, and was intubated on 4/29. Now deeply sedated with propofol and fentanyl. Required initiation of paralytic due to vent asynchrony/high peak pressures. Remains in dual pressor shock state. Prepping for initiation of CVVHD in the setting of worsening renal failure. Nitric oxide was also started due to severe hypoxemia despite high ventilator settings (100%/+8).    PAST MEDICAL & SURGICAL HISTORY:  Hypothyroid  Diabetes  HTN (hypertension)  Cataract  Mitral valve disease  s/p MVR  Sarcoid  involving lungs, heart, ears and eyes  Glaucoma  Paroxysmal atrial fibrillation  VT (ventricular tachycardia)  [facility] refused VT ablation  Nonischemic cardiomyopathy  EF <20%; DDDR ICD '99; gen change '03, BiV upgrade '08, gen change '2014  Systolic heart failure  HLD (hyperlipidemia)  Elective surgery  back [facility]  H/O mitral valve replacement  ICD (implantable cardioverter-defibrillator) in place  s/p DDDR ICD '99, gen change '03, BiV upgrade '08, gen change 2014    Review of Systems:  CONSTITUTIONAL: No fever, chills, or fatigue.  EYES: No eye pain, visual disturbances, or discharge.  ENMT:  No difficulty hearing, tinnitus, or vertigo. No sinus or throat pain.  NECK: No pain or stiffness.  RESPIRATORY: No shortness of breath, cough, or wheezing.  CARDIOVASCULAR: No chest pain, palpitations, dizziness, or leg swelling.  GASTROINTESTINAL: No abdominal or epigastric pain. No nausea, vomiting, diarrhea, or constipation. No hematemesis, melena, or hematochezia.  GENITOURINARY: No dysuria, increased frequency, hematuria, or incontinence.  NEUROLOGICAL: No headaches, memory loss, loss of strength, numbness, or tremors.  SKIN: No itching, burning, rashes, or lesions.  MUSCULOSKELETAL: No joint pain or swelling. No muscle, back, or extremity pain.  PSYCHIATRIC: No depression, anxiety, mood swings, or difficulty sleeping.    Medications:  cefepime  Injectable. 1000 milliGRAM(s) IV Push every 12 hours  midodrine 10 milliGRAM(s) Oral every 8 hours  norepinephrine Infusion 0.05 MICROgram(s)/kG/Min IV Continuous <Continuous>  albuterol    0.083% 2.5 milliGRAM(s) Nebulizer every 2 hours PRN  albuterol/ipratropium for Nebulization 3 milliLiter(s) Nebulizer every 4 hours  buDESOnide    Inhalation Suspension 0.5 milliGRAM(s) Inhalation every 12 hours  cisatracurium Infusion 2.999 MICROgram(s)/kG/Min IV Continuous <Continuous>  fentaNYL   Infusion. 0.5 MICROgram(s)/kG/Hr IV Continuous <Continuous>  propofol Infusion 5 MICROgram(s)/kG/Min IV Continuous <Continuous>  heparin   Injectable 2000 Unit(s) IV Push every 6 hours PRN  heparin   Injectable 4000 Unit(s) IV Push every 6 hours PRN  heparin  Infusion. 400 Unit(s)/Hr IV Continuous <Continuous>  pantoprazole  Injectable 40 milliGRAM(s) IV Push two times a day  dextrose 50% Injectable 12.5 Gram(s) IV Push once  dextrose 50% Injectable 25 Gram(s) IV Push once  dextrose 50% Injectable 25 Gram(s) IV Push once  dextrose Oral Gel 15 Gram(s) Oral once PRN  glucagon  Injectable 1 milliGRAM(s) IntraMuscular once  insulin glargine Injectable (LANTUS) 10 Unit(s) SubCutaneous at bedtime  insulin lispro (ADMELOG) corrective regimen sliding scale   SubCutaneous every 6 hours  levothyroxine Injectable 36 MICROGram(s) IV Push at bedtime  methylPREDNISolone sodium succinate Injectable 60 milliGRAM(s) IV Push every 6 hours  vasopressin Infusion 0.04 Unit(s)/Min IV Continuous <Continuous>  dextrose 5%. 1000 milliLiter(s) IV Continuous <Continuous>  dextrose 5%. 1000 milliLiter(s) IV Continuous <Continuous>  sodium bicarbonate  Infusion 0.224 mEq/kG/Hr IV Continuous <Continuous>  chlorhexidine 0.12% Liquid 15 milliLiter(s) Oral Mucosa every 12 hours  chlorhexidine 2% Cloths 1 Application(s) Topical <User Schedule>  dorzolamide 2% Ophthalmic Solution 1 Drop(s) Both EYES three times a day  latanoprost 0.005% Ophthalmic Solution 1 Drop(s) Both EYES at bedtime    CRRT Treatment    <Continuous>  Phoxillum Filtration BK 4 / 2.5 5000 milliLiter(s) CRRT <Continuous>  Phoxillum Filtration BK 4 / 2.5 5000 milliLiter(s) CRRT <Continuous>  Phoxillum Filtration BK 4 / 2.5 5000 milliLiter(s) CRRT <Continuous>    Mode: AC/ CMV (Assist Control/ Continuous Mandatory Ventilation)  RR (machine): 30  TV (machine): 350  FiO2: 100  PEEP: 8  ITime: 0.6  MAP: 16  PC: 33  PIP: 42    ICU Vital Signs Last 24 Hrs  T(C): 35.8 (30 Apr 2023 00:45), Max: 37.5 (29 Apr 2023 17:45)  T(F): 96.4 (30 Apr 2023 00:45), Max: 99.5 (29 Apr 2023 17:45)  HR: 84 (30 Apr 2023 00:45) (75 - 92)  BP: 97/39 (30 Apr 2023 00:00) (61/31 - 109/74)  BP(mean): 57 (30 Apr 2023 00:00) (42 - 86)  ABP: 136/49 (30 Apr 2023 00:45) (100/34 - 151/73)  ABP(mean): 69 (30 Apr 2023 00:45) (-10 - 95)  RR: 30 (30 Apr 2023 00:45) (13 - 47)  SpO2: 100% (30 Apr 2023 00:45) (82% - 100%)    O2 Parameters below as of 30 Apr 2023 00:07  Patient On (Oxygen Delivery Method): ventilator    ABG - ( 29 Apr 2023 20:47 )  pH, Arterial: 7.210 pH, Blood: x     /  pCO2: 48    /  pO2: 93    / HCO3: 19    / Base Excess: -8.7  /  SaO2: 99.5      I&O's Detail    28 Apr 2023 07:01  -  29 Apr 2023 07:00  --------------------------------------------------------  IN:    Dexmedetomidine: 299.8 mL    Heparin Infusion: 73 mL    IV PiggyBack: 250 mL    IV PiggyBack: 250 mL    Norepinephrine: 512.3 mL    Norepinephrine: 305.5 mL    Vasopressin: 144 mL  Total IN: 1834.6 mL    OUT:    Indwelling Catheter - Urethral (mL): 355 mL  Total OUT: 355 mL    Total NET: 1479.6 mL    29 Apr 2023 07:01  -  30 Apr 2023 00:58  --------------------------------------------------------  IN:    Cisatracurium: 156.5 mL    Dexmedetomidine: 25.2 mL    DOBUTamine: 22.2 mL    FentaNYL: 50 mL    Heparin Infusion: 56 mL    Norepinephrine: 756 mL    Norepinephrine: 189.7 mL    Propofol: 126.5 mL    Sodium Bicarbonate: 750 mL    Vasopressin: 102 mL  Total IN: 2234.1 mL    OUT:    Heparin Infusion: 0 mL    Indwelling Catheter - Urethral (mL): 180 mL  Total OUT: 180 mL    Total NET: 2054.1 mL    LABS:                        8.5    29.75 )-----------( 130      ( 29 Apr 2023 16:18 )             25.1     04-29    139  |  100  |  53.3<H>  ----------------------------<  272<H>  4.5   |  20.0<L>  |  2.37<H>    Ca    8.2<L>      29 Apr 2023 20:38  Phos  6.0     04-29  Mg     2.0     04-29    TPro  5.1<L>  /  Alb  2.9<L>  /  TBili  2.8<H>  /  DBili  x   /  AST  1150<H>  /  ALT  1070<H>  /  AlkPhos  173<H>  04-29    CAPILLARY BLOOD GLUCOSE    POCT Blood Glucose.: 316 mg/dL (30 Apr 2023 00:10)    PT/INR - ( 28 Apr 2023 18:10 )   PT: 39.5 sec;   INR: 3.36 ratio    PTT - ( 29 Apr 2023 16:53 )  PTT:101.2 sec    CULTURES:  Culture Results:   No growth to date. (04-26 @ 22:10)  Culture Results:   No growth to date. (04-26 @ 22:09)  Culture Results:   50,000 - 99,000 CFU/mL Streptococcus agalactiae (Group B) isolated  Group B streptococci are susceptible to ampicillin,  penicillin and cefazolin, but may be resistant to  erythromycin and clindamycin.  Recommendations for intrapartum prophylaxis for Group B  streptococci are penicillin or ampicillin.  <10,000 CFU/ml Normal Urogenital massimo present (04-26 @ 21:40)  Rapid RVP Result: Detected (04-26 @ 14:37)    Physical Examination:    General: Intubated.    HEENT: Pupils equal, reactive to light. Symmetric. No scleral icterus or injection.    PULM: Coarse breath sounds b/l.    NECK: Supple, no lymphadenopathy, trachea midline.    CVS: Paced rhythm.    ABD: Soft, nondistended, nontender, normoactive bowel sounds.    EXT: No edema, nontender.    SKIN: Warm and well perfused, no rashes noted.    NEURO: Sedated and paralyzed.    RADIOLOGY:  < from: Xray Chest 1 View- PORTABLE-Routine (Xray Chest 1 View- PORTABLE-Routine .) (04.28.23 @ 20:45) >    ACC: [id number] EXAM:  XR CHEST PORTABLE ROUTINE 1V   ORDERED BY: JUANITA CLANCY     PROCEDURE DATE:  04/28/2023      INTERPRETATION:  Exam:XR CHEST    clinical history:pneumonia, CHF    Worsening bilateral airspace disease. Stable calcified nodes. No   pneumothorax    IMPRESSION: Worsening bilateral airspace disease    --- End of Report ---    CASEY NAM MD; Attending Radiologist  This document has been electronically signed. Apr 29 2023 12:03PM    < end of copied text >    CRITICAL CARE TIME SPENT: 40 minutes

## 2023-04-30 NOTE — PROGRESS NOTE ADULT - ASSESSMENT
72 y/o F with PMHx of sarcoidosis, NICM (LVEF < 20%), mechanical MVR on warfarin, paroxsymal a fib, and recent exposure to  who has HMPV admitted with:    Acute hypoxic respiratory failure, ARDS, HMPV infection, septic plus cardiogenic shock, acute renal failure, metabolic/lactic acidosis, transaminitis, hyperglycemia, anemia    PLAN:  - Deeply sedated with propofol and fentanyl infusions.  - Paralyzed with nimbex s/p intubation in the setting of ventilator asynchrony/elevated peak pressures.  - Actively titrating levophed infusion to maintain MAP > 65.  - Fixed dose vasopressin infusion.  - Stress dose steroids.  - Bedside POCUS performed overnight revealed severely reduced EF.  - Intubated on 4/29 s/p NIPPV failure. Actively titrating ventilator settings to maintain SpO2 > 92% and adequate minute ventilation. Meets severe ARDS criteria.  - Inhaled nitric oxide at 20.  - CVVHD initiated overnight in the setting of worsening renal failure.  - Continue cefepime. All cultures without growth.  - GI prophylaxis with protonix.  - Holding tube feeds while paralyzed.  - If hyperglycemia persists, may need to re-start insulin infusion.  - S/p 1u PRBC on 4/29.  - Full anticoagulation with heparin infusion.  - Prognosis poor. Now DNR.

## 2023-04-30 NOTE — PROVIDER CONTACT NOTE (HYPOGLYCEMIA EVENT) - NS PROVIDER CONTACT BACKGROUND-HYPO
Age: 73y    Gender: Female    POCT Blood Glucose:  109 mg/dL (04-30-23 @ 17:50)  147 mg/dL (04-30-23 @ 16:34)  63 mg/dL (04-30-23 @ 16:04)  93 mg/dL (04-30-23 @ 11:51)  227 mg/dL (04-30-23 @ 05:26)  316 mg/dL (04-30-23 @ 00:10)  242 mg/dL (04-29-23 @ 21:50)      eMAR:dextrose 50% Injectable   12.5 Gram(s) IV Push (04-30-23 @ 16:18)    insulin glargine Injectable (LANTUS)   10 Unit(s) SubCutaneous (04-29-23 @ 21:52)    insulin lispro (ADMELOG) corrective regimen sliding scale   4 Unit(s) SubCutaneous (04-30-23 @ 05:30)   8 Unit(s) SubCutaneous (04-30-23 @ 00:13)   6 Unit(s) SubCutaneous (04-29-23 @ 18:46)    levothyroxine Injectable   36 MICROGram(s) IV Push (04-29-23 @ 21:48)    methylPREDNISolone sodium succinate Injectable   60 milliGRAM(s) IV Push (04-30-23 @ 17:26)   60 milliGRAM(s) IV Push (04-30-23 @ 13:17)   60 milliGRAM(s) IV Push (04-30-23 @ 05:22)   60 milliGRAM(s) IV Push (04-30-23 @ 00:16)

## 2023-04-30 NOTE — PROGRESS NOTE ADULT - SUBJECTIVE AND OBJECTIVE BOX
INTERVAL HISTORY:  Remains intubated and on pressors  Also on CVVHD  	  MEDICATIONS:  midodrine 10 milliGRAM(s) Oral every 8 hours  norepinephrine Infusion 0.05 MICROgram(s)/kG/Min IV Continuous <Continuous>    cefepime  Injectable. 1000 milliGRAM(s) IV Push every 12 hours    albuterol    0.083% 2.5 milliGRAM(s) Nebulizer every 2 hours PRN  albuterol/ipratropium for Nebulization 3 milliLiter(s) Nebulizer every 4 hours  buDESOnide    Inhalation Suspension 0.5 milliGRAM(s) Inhalation every 12 hours    cisatracurium Infusion 2.999 MICROgram(s)/kG/Min IV Continuous <Continuous>  fentaNYL   Infusion. 0.5 MICROgram(s)/kG/Hr IV Continuous <Continuous>  propofol Infusion 5 MICROgram(s)/kG/Min IV Continuous <Continuous>    pantoprazole  Injectable 40 milliGRAM(s) IV Push two times a day    dextrose 50% Injectable 25 Gram(s) IV Push once  dextrose 50% Injectable 12.5 Gram(s) IV Push once  dextrose 50% Injectable 25 Gram(s) IV Push once  dextrose Oral Gel 15 Gram(s) Oral once PRN  glucagon  Injectable 1 milliGRAM(s) IntraMuscular once  insulin glargine Injectable (LANTUS) 10 Unit(s) SubCutaneous at bedtime  insulin lispro (ADMELOG) corrective regimen sliding scale   SubCutaneous every 6 hours  levothyroxine Injectable 36 MICROGram(s) IV Push at bedtime  methylPREDNISolone sodium succinate Injectable 60 milliGRAM(s) IV Push every 6 hours  vasopressin Infusion 0.04 Unit(s)/Min IV Continuous <Continuous>    chlorhexidine 0.12% Liquid 15 milliLiter(s) Oral Mucosa every 12 hours  chlorhexidine 2% Cloths 1 Application(s) Topical <User Schedule>  dextrose 5%. 1000 milliLiter(s) IV Continuous <Continuous>  dextrose 5%. 1000 milliLiter(s) IV Continuous <Continuous>  dorzolamide 2% Ophthalmic Solution 1 Drop(s) Both EYES three times a day  heparin   Injectable 2000 Unit(s) IV Push every 6 hours PRN  heparin   Injectable 4000 Unit(s) IV Push every 6 hours PRN  heparin  Infusion. 400 Unit(s)/Hr IV Continuous <Continuous>  latanoprost 0.005% Ophthalmic Solution 1 Drop(s) Both EYES at bedtime        PHYSICAL EXAM:    T(C): 37.3 (04-30-23 @ 14:00), Max: 37.5 (04-29-23 @ 17:45)  HR: 83 (04-30-23 @ 14:00) (75 - 95)  BP: 77/34 (04-30-23 @ 09:00) (76/55 - 97/39)  RR: 30 (04-30-23 @ 14:00) (11 - 31)  SpO2: 100% (04-30-23 @ 14:00) (82% - 100%)  Wt(kg): --    I&O's Summary    29 Apr 2023 07:01  -  30 Apr 2023 07:00  --------------------------------------------------------  IN: 3393.4 mL / OUT: 745 mL / NET: 2648.4 mL    30 Apr 2023 07:01  -  30 Apr 2023 14:48  --------------------------------------------------------  IN: 872.4 mL / OUT: 984 mL / NET: -111.6 mL        Daily     Daily     Appearance: Intubated  Cardiovascular: Normal S1, Mechanical S2  Respiratory: Coarse breath sounds	  Gastrointestinal:  Soft, Non-tender, + BS	  Neurologic: Intubated                        8.6    32.28 )-----------( 112      ( 30 Apr 2023 08:25 )             25.2     04-30    139  |  102  |  39.4<H>  ----------------------------<  159<H>  4.1   |  25.0  |  1.75<H>    Ca    7.8<L>      30 Apr 2023 08:25  Phos  5.2     04-30  Mg     2.0     04-30    TPro  5.2<L>  /  Alb  2.7<L>  /  TBili  3.1<H>  /  DBili  x   /  AST  1371<H>  /  ALT  1300<H>  /  AlkPhos  171<H>  04-30    proBNP:   Lipid Profile:   HgA1c:     ASSESSMENT/PLAN: 	  Patient is a 73y old  Female with h/o severe MR, treated with mechanical MVR, HTN, IDDM, HLD, renal insufficiency,  severe nonischemic dilated cardiomyopathy with LVEF=15 %, chronic systolic CHF, paroxysmal VT, s/p DDD biventricular ICD implantation, paroxysmal atrial tachycardia, prior CVA off of anticoagulation, pulmonary sarcoidosis,  glaucoma, who presented on 4/26/2023 with a chief complaint of cough productive of yellowish , chills, dyspnea, chills, severe headaches, 3 pillow orthopnea x 1 to 2 days and was referred to ER during an office visti 4/26/2023 after patient was found to be hypotensive with BP of 80/60 mm Hg with diffuse inspiratory/expiratory wheezing and rhonchi bilaterally.  CXR 4/26/2023 stable chronic fibrotic changes, abdominal u/s 4/26/2023 gallstones can not rule out acute cholecystitis, elevated proBNP of 2,551, abnormal LFTs: AST 1370, ALT 1209, , Positive HMPV titers (Resp RVP).    Now on pressors and intubated  Supportive care and management as per ICU team

## 2023-04-30 NOTE — PROCEDURE NOTE - NSINFORMCONSENT_GEN_A_CORE
Benefits, risks, and possible complications of procedure explained to patient/caregiver who verbalized understanding and gave written consent.
This was an emergent procedure.

## 2023-04-30 NOTE — PROGRESS NOTE ADULT - ASSESSMENT
The patient is a 73 year old female with PMH of DM, HTN, Afib, nonischemic dilated cardiomyopathy, HFrEF (EF 15%), s/p ICD, severe MR s/p mechanical MVR, paroxysmal VT, sarcoidosis, and hypothyroidism presents with SOB associated with productive cough. Admitted for acute hypoxic respiratory failure secondary to human metapneumovirus, ultimately requiring intubation as well as nimbex on 04/29/2023. Currently in ARDS. Hospital course is notable for shock; currently on multiple pressors; transaminitis; and DERIK requiring initiation of CRRT on 04/29/2023 given worsening acid base status.     -DERIK on CKD is secondary to ischemic ATN as well as vancomycin nephrotoxicity (vancomycin random level is 47 -> last dose of IV vancomycin was more than 24 hours ago)  -Baseline creatinine is 1.6mg/dL in Jan 2023 (as per [facility])  -On admission, creatinine was at baseline (suspecting baseline creatinine to range ~1.4-1.7mg/dL) with bland UA  -While here, creatinine up trended to 2.4mg/dL; will recheck UA   -Started on CRRT on 04/29/2023 given oliguric DERIK and worsening acid base status   -Urine output remains minimal at this time   -Also hemodynamically unstable - pressor management as per primary team    -Acid base status - metabolic acidosis improving - recommend discontinuation of isotonic bicarb gtt    -CRRT orders will remain unchanged from yesterday's:    CVVHDF, blood flow rate 200, prefilter replacement fluid rate 400, post filter replacement fluid rate 400, dialysate rate 800, net even ultrafiltration - currently on systemic heparin gtt   -Prognosis remains guarded    Navarro Lucio DO  Nephrology  [facility]  Office Number [phone]

## 2023-04-30 NOTE — PROCEDURE NOTE - ADDITIONAL PROCEDURE DETAILS
Initial attempt at RIJ access aborted due to inability to pass guidewire despite easy cannulation of vessel and excellent blood flow through needle. Suspect this was due to ICD wires. Second attempt in right femoral vein successful on first attempt.     Patient with mixed septic/cardiogenic shock, DERIK, shock liver, acute hypoxemic respiratory failure, HMPV.    Procedure performed independently of critical care time.
Dx: Acute hypoxic respiratory failure, ARDS, acute renal failure, metabolic acidosis    Procedure performed independent of critical care time.
Indication: encephalopathy, nos     Ind of CCT
Patient with mixed septic/cardiogenic shock, DERIK, shock liver, acute hypoxemic respiratory failure, HMPV.    Procedure performed independently of critical care time.
Indication: acute hypoxic respiratory failure  Procedure performed independently of CC time

## 2023-04-30 NOTE — PROCEDURE NOTE - NSPROCDETAILS_GEN_ALL_CORE
guidewire recovered/lumen(s) aspirated and flushed/sterile dressing applied/sterile technique, catheter placed/ultrasound guidance with use of sterile gel and probe cove
location identified, draped/prepped, sterile technique used, needle inserted/introduced/positive blood return obtained via catheter/connected to a pressurized flush line/sutured in place/hemostasis with direct pressure, dressing applied/Seldinger technique/all materials/supplies accounted for at end of procedure
patient pre-oxygenated, tube inserted, placement confirmed
guidewire recovered/lumen(s) aspirated and flushed/sterile dressing applied/sterile technique, catheter placed/ultrasound guidance with use of sterile gel and probe cove

## 2023-05-01 NOTE — PROVIDER CONTACT NOTE (CRITICAL VALUE NOTIFICATION) - NAME OF MD/NP/PA/DO NOTIFIED:
NABIL Urrutia
Dr. Jose MYERS
Dr Vargas
MICU Team
Jemal Mendoza: PA
LUCIA Joaquin
Dr Koch
Dr. Cyrus CAR
LUCIA FERRER
LUCIA FERRER
Dr.Charles MYERS
LUCIA Joaquin
LUCIA Joaquin
MICU

## 2023-05-01 NOTE — PROGRESS NOTE ADULT - ASSESSMENT
74 y/o F with a h/o sarcoidosis, NICM (LVEF < 20%), mechanical MVR (on warfarin), pAF, hypothyroid, recent exposure to  who has HMPV, with:    # Mixed septic/cardiogenic shock  # Acute hypoxemic respiratory failure  # ARDS  # HMPV  # DERIK  # Acute liver failure  # Elevated INR  # Hypoglycemia      - actively titrating norepinephrine to maintain a MAP > 65, dose rate escalated to 3 mcg/kg/min  - maintain fixed dose vasopressin  - stop CVVHD given rapid decline in hemodynamics  - actively titrating vent settings to maintain SpO2 > 92% and adequate minute ventilation  - continue Ute for now  - deep sedation with propofol and fentanyl infusions, paralysis with cisatracurium   - blood and urine cultures are unremarkable, continue empiric cefepime  - DERIK secondary to ischemic ATN, optimize end-organ perfusion as above  - trend BUN/Cr, electrolytes, acid-base balance, and monitor hourly UOP (anuric)  - LFTs uptrending, INR up to 7.6, TBili up to 4.2, progressing into liver failure, avoid hepatotoxic agents  - discontinue heparin infusion for now  - giving amps of D50 for hypoglycemia    Dr. Garcia has reached out to the patient's daughter via telephone and updated her on Ligia's deteriorating state and high risk for sudden cardiac arrest. We are doing all we can for her at this time and she is on an extraordinary amount of life support, however it appears that she is in her dying process. Unfortunately her prognosis is grim.    Case discussed with MICU physician, Dr. Garcia.

## 2023-05-01 NOTE — CHART NOTE - NSCHARTNOTEFT_GEN_A_CORE
Called to bedside for PEA.  Patient code status DNR.   Patient with multisystem organ failure on dual pressor support, in ARDS on NO.   After discussion with family, it was decided no further escalation of care.   Patient with no tracing on arterial line.   No palpable pulse.   Unresponsive.   Pupils fixed and dilated.   Time of death: 13:10.   Family at bedside and notified.   Dr. Vargas notified.

## 2023-05-01 NOTE — PROVIDER CONTACT NOTE (CRITICAL VALUE NOTIFICATION) - TEST AND RESULT REPORTED:
INR 8.4
Lactate 4.4
Hgb: 6.4, Hct: 19.9
PTT > 200
lactate 7.7
Lactate: 4.1
Trop 0.26
Vanco Trough: 47.0
aPTT: >200
Heparin Assay 1.67
glucose 33  lactate 10.8
inr-7.62
ptt 159.6
ptt >200
vanco trough = 80

## 2023-05-01 NOTE — CHART NOTE - NSCHARTNOTESELECT_GEN_ALL_CORE
Critical care/Event Note
Event Note
SURGERY ATTENDING/Event Note
Critical Care/Event Note
Event Note
ICU/Event Note

## 2023-05-01 NOTE — CHART NOTE - NSCHARTNOTEFT_GEN_A_CORE
Patient's family contacted to come into the hospital given her continued hemodynamic decline. Now on maximum dose norepinephrine + vasopressin with progressive MSOF. Unable to maintain a MAP > 65. Lactate now 10+. Family understands that she is dying and we have exhausted all available medical therapies. Cardiac arrest is imminent. I discussed the option of transitioning to comfort measures given the fact that she is no longer responsive to treatment. They will let us know.    Discussed with MICU physician, Dr. Garcia.

## 2023-05-01 NOTE — PROGRESS NOTE ADULT - NS PANP OPT1 GEN_ALL_CORE
ERP at bedside to update mother on results.  
I independently performed the documented history, exam, and medical decision making.

## 2023-05-01 NOTE — PROGRESS NOTE ADULT - REASON FOR ADMISSION
Acute hypoxic respiratory failure

## 2023-05-01 NOTE — PROGRESS NOTE ADULT - NS PANP COMMENT GEN_ALL_CORE FT
72 yo female with sarcoidosis, NICM, chronic systolic CHF, mechanical MVR, AF, on warfarin, hypothyroidism, now admitted with acute respiratory failure due to hMPV viral pneumonia requiring NIV, distributive shock, encephalopathy/delirium, DERIK.
74 y/o F with hx of sarcoidosis, NICM with HFrEF EF 20% s/p ICD, present with hMPV infection with possible superimpose bacterial pneumonia.  Now complicated with worsening acute hypoxic respiratory failure not tolerating bipap, intubated on 4/29/23.  And further developed multiorgan failure (anuric DERIK, metabolic acidosis, 2 pressor shocks, shock liver)  Started CVVH 4/30, however patient continues to have escalating pressor requirement, thus patient not tolerate and cvvh held on 5/1. Patient LFT was worsen, lactate at 10, patient actively dying.  Informed daughter, and family by bedside.  Continues to provide emotional support to the family.
72 y/o F with hx of sarcoidosis, NICM with HFrEF EF 20% s/p ICD, present with hMPV infection with possible superimpose bacterial pneumonia.  Now complicated with worsening acute hypoxic respiratory failure not tolerating bipap, intubated on 4/29/23.  And further developed multiorgan failure (anuric DERIK, metabolic acidosis, 2 pressor shocks, shock liver)  GOC carried out with family, will make DNR but trial of critical care.  Also discussed about HD, will proceed with trial of CVVH.   Continue antibiotic, pending cultures.

## 2023-05-01 NOTE — PROGRESS NOTE ADULT - SUBJECTIVE AND OBJECTIVE BOX
Patient is a 73y old  Female who presents with a chief complaint of acute hypoxic respiratory failure (30 Apr 2023 12:47)      BRIEF HOSPITAL COURSE: BRIEF HOSPITAL COURSE: 74 y/o F with a h/o sarcoidosis, NICM (LVEF < 20%), mechanical MVR (on warfarin), pAF, hypothyroid, recent exposure to  who has HMPV, admitted on 4/26 with complaints of SOB, wheezing, and productive cough x 2 days. MICU eval requested as patient exhibited persistent dyspnea for several hours and began to develop hypotension (SBP 60s). She tested positive for HMPV. Degenerated into mixed septic/cardiogenic shock state with MSOF requiring dual IV vasopressor therapy. Started on NIPPV for work of breathing and ultimately required intubation on 4/29 for progression to ARDS. Ute started for severe hypoxemia. CVVHD started on 4/30.      Events last 24 hours: Patient remains dependent on full mechanical ventilator support with high FiO2/PEEP requirement. Deeply sedated and on IV paralytic. Worsening shock state through the night into this morning. CVVHD stopped given declining hemodynamics.        PAST MEDICAL & SURGICAL HISTORY:  Hypothyroid      Diabetes      HTN (hypertension)      Cataract      Mitral valve disease  s/p MVR      Sarcoid  involving lungs, heart, ears and eyes      Glaucoma      Paroxysmal atrial fibrillation      VT (ventricular tachycardia)  [facility] refused VT ablation      Nonischemic cardiomyopathy  EF <20%; DDDR ICD '99; gen change '03, BiV upgrade '08, gen change '2014      Systolic heart failure      HLD (hyperlipidemia)      Elective surgery  back [facility]      H/O mitral valve replacement      ICD (implantable cardioverter-defibrillator) in place  s/p DDDR ICD '99, gen change '03, BiV upgrade '08, gen change 2014          Review of Systems:  Unable to obtain secondary to sedation/intubation.      Medications:  cefepime  Injectable. 1000 milliGRAM(s) IV Push every 12 hours  midodrine 10 milliGRAM(s) Oral every 8 hours  norepinephrine Infusion 0.05 MICROgram(s)/kG/Min IV Continuous <Continuous>  albuterol    0.083% 2.5 milliGRAM(s) Nebulizer every 2 hours PRN  albuterol/ipratropium for Nebulization 3 milliLiter(s) Nebulizer every 4 hours  buDESOnide    Inhalation Suspension 0.5 milliGRAM(s) Inhalation every 12 hours  cisatracurium Infusion 2.999 MICROgram(s)/kG/Min IV Continuous <Continuous>  fentaNYL   Infusion. 0.5 MICROgram(s)/kG/Hr IV Continuous <Continuous>  propofol Infusion 5 MICROgram(s)/kG/Min IV Continuous <Continuous>  heparin   Injectable 2000 Unit(s) IV Push every 6 hours PRN  heparin   Injectable 4000 Unit(s) IV Push every 6 hours PRN  heparin  Infusion. 400 Unit(s)/Hr IV Continuous <Continuous>  pantoprazole  Injectable 40 milliGRAM(s) IV Push two times a day  dextrose 50% Injectable 12.5 Gram(s) IV Push once  dextrose 50% Injectable 25 Gram(s) IV Push once  dextrose 50% Injectable 25 Gram(s) IV Push once  dextrose Oral Gel 15 Gram(s) Oral once PRN  glucagon  Injectable 1 milliGRAM(s) IntraMuscular once  insulin glargine Injectable (LANTUS) 10 Unit(s) SubCutaneous at bedtime  insulin lispro (ADMELOG) corrective regimen sliding scale   SubCutaneous every 6 hours  levothyroxine Injectable 36 MICROGram(s) IV Push at bedtime  methylPREDNISolone sodium succinate Injectable 60 milliGRAM(s) IV Push every 6 hours  vasopressin Infusion 0.04 Unit(s)/Min IV Continuous <Continuous>  dextrose 5%. 1000 milliLiter(s) IV Continuous <Continuous>  dextrose 5%. 1000 milliLiter(s) IV Continuous <Continuous>  chlorhexidine 0.12% Liquid 15 milliLiter(s) Oral Mucosa every 12 hours  chlorhexidine 2% Cloths 1 Application(s) Topical <User Schedule>  dorzolamide 2% Ophthalmic Solution 1 Drop(s) Both EYES three times a day  latanoprost 0.005% Ophthalmic Solution 1 Drop(s) Both EYES at bedtime    CRRT Treatment    <Continuous>  Phoxillum Filtration BK 4 / 2.5 5000 milliLiter(s) CRRT <Continuous>  Phoxillum Filtration BK 4 / 2.5 5000 milliLiter(s) CRRT <Continuous>  Phoxillum Filtration BK 4 / 2.5 5000 milliLiter(s) CRRT <Continuous>      Mode: AC/ CMV (Assist Control/ Continuous Mandatory Ventilation)  RR (machine): 30  TV (machine): 250  FiO2: 90  PEEP: 8  ITime: 0.6  MAP: 16  PC: 33  PIP: 73      ICU Vital Signs Last 24 Hrs  T(C): 37.3 (01 May 2023 00:30), Max: 37.4 (30 Apr 2023 22:00)  T(F): 99.1 (01 May 2023 00:30), Max: 99.3 (30 Apr 2023 22:00)  HR: 83 (01 May 2023 00:30) (75 - 101)  BP: 82/40 (01 May 2023 00:00) (77/34 - 96/62)  BP(mean): 52 (01 May 2023 00:00) (46 - 73)  ABP: 131/38 (01 May 2023 00:30) (101/40 - 141/48)  ABP(mean): 56 (01 May 2023 00:30) (52 - 76)  RR: 30 (30 Apr 2023 18:45) (11 - 30)  SpO2: 100% (01 May 2023 00:30) (94% - 100%)    O2 Parameters below as of 30 Apr 2023 20:00  Patient On (Oxygen Delivery Method): ventilator    O2 Concentration (%): 90        ABG - ( 30 Apr 2023 22:54 )  pH, Arterial: 7.270 pH, Blood: x     /  pCO2: 42    /  pO2: 102   / HCO3: 19    / Base Excess: -7.6  /  SaO2: 99.2                I&O's Detail    29 Apr 2023 07:01  -  30 Apr 2023 07:00  --------------------------------------------------------  IN:    Cisatracurium: 241.2 mL    Dexmedetomidine: 25.2 mL    DOBUTamine: 22.2 mL    FentaNYL: 85 mL    Heparin Infusion: 72 mL    Norepinephrine: 756 mL    Norepinephrine: 582.6 mL    Propofol: 190.2 mL    Sodium Bicarbonate: 1275 mL    Vasopressin: 144 mL  Total IN: 3393.4 mL    OUT:    Heparin Infusion: 0 mL    Indwelling Catheter - Urethral (mL): 275 mL    Other (mL): 470 mL  Total OUT: 745 mL    Total NET: 2648.4 mL      30 Apr 2023 07:01  -  01 May 2023 01:08  --------------------------------------------------------  IN:    Cisatracurium: 157.3 mL    FentaNYL: 65 mL    Heparin Infusion: 28 mL    Norepinephrine: 969.5 mL    Propofol: 118.3 mL    Sodium Bicarbonate: 225 mL    Vasopressin: 78 mL  Total IN: 1641.1 mL    OUT:    Indwelling Catheter - Urethral (mL): 20 mL    Other (mL): 1720 mL  Total OUT: 1740 mL    Total NET: -98.9 mL            LABS:                        8.3    36.09 )-----------( 107      ( 30 Apr 2023 21:20 )             24.0     04-30    137  |  103  |  25.2<H>  ----------------------------<  70  4.7   |  20.0<L>  |  1.16    Ca    7.8<L>      30 Apr 2023 21:20  Phos  4.5     04-30  Mg     2.1     04-30    TPro  5.0<L>  /  Alb  2.7<L>  /  TBili  4.2<H>  /  DBili  x   /  AST  1931<H>  /  ALT  1671<H>  /  AlkPhos  181<H>  04-30      CARDIAC MARKERS ( 30 Apr 2023 15:09 )  x     / 0.26 ng/mL / x     / x     / x          CAPILLARY BLOOD GLUCOSE      POCT Blood Glucose.: 98 mg/dL (01 May 2023 00:09)    PT/INR - ( 30 Apr 2023 21:20 )   PT: 90.2 sec;   INR: 7.62 ratio         PTT - ( 30 Apr 2023 21:20 )  PTT:70.0 sec    CULTURES:  Culture Results:   No growth to date. (04-26-23 @ 22:10)  Culture Results:   No growth to date. (04-26-23 @ 22:09)  Culture Results:   50,000 - 99,000 CFU/mL Streptococcus agalactiae (Group B) isolated  Group B streptococci are susceptible to ampicillin,  penicillin and cefazolin, but may be resistant to  erythromycin and clindamycin.  Recommendations for intrapartum prophylaxis for Group B  streptococci are penicillin or ampicillin.  <10,000 CFU/ml Normal Urogenital massimo present (04-26-23 @ 21:40)  Rapid RVP Result: Detected (04-26-23 @ 14:37)        Physical Examination:    General: No acute distress. sedated, intubated, toxic appearing    HEENT: Pupils equal, reactive to light.  Symmetric.    PULM: diminished breath sounds bilaterally, no significant sputum production    CVS: Regular rate and rhythm, no murmurs, rubs, or gallops    ABD: Soft, nondistended, nontender, normoactive bowel sounds, no masses    EXT: No edema, nontender    SKIN: Warm and well perfused, no rashes noted.    NEURO: heavily sedated and paralyzed        RADIOLOGY:     < from: Xray Chest 1 View- PORTABLE-Urgent (Xray Chest 1 View- PORTABLE-Urgent .) (04.29.23 @ 22:33) >  ET tube and enteric catheter unchanged in position. Left chest wall   implanted AICD, sternotomy sutures, mediastinal clips and aortic valve   prosthesis again noted.    Heart size may be exaggerated by AP technique. Nosignificant interval   change pleural effusions with bilateral airspace opacities, more   confluent left lower lung with air bronchograms.    No significant osseous abnormalities.    IMPRESSION:    No significant interval change pleural effusions with bilateral airspace   opacities, more confluent left lower lung with air bronchograms.          CRITICAL CARE TIME SPENT: 40 mins  Time spent evaluating/treating patient with medical issues that pose a high risk for life threatening deterioration and/or end-organ damage, reviewing data/labs/imaging, discussing case with multidisciplinary team, discussing plan/goals of care with patient/family. Non-inclusive of procedure time.

## 2023-05-01 NOTE — PROVIDER CONTACT NOTE (CRITICAL VALUE NOTIFICATION) - PERSON GIVING RESULT:
[facility]
LAB
Lab
[facility]
[place]
Melanie GOMEZ
Laboratory
lab-meliza cooper
[facility]
lab- eduardo lazo
lab- valeriy rodríguez

## 2023-05-01 NOTE — PROGRESS NOTE ADULT - PROVIDER SPECIALTY LIST ADULT
Critical Care
Intervent Cardiology
Intervent Cardiology
Nephrology
Gastroenterology
Critical Care
Critical Care
Cardiology
Gastroenterology

## 2023-05-01 NOTE — PROVIDER CONTACT NOTE (CRITICAL VALUE NOTIFICATION) - SITUATION
Notified by lab with critical lab value.
Notified by lab with critical lab value.
Vanco trough = 80
Notified by lab with critical lab value.

## 2023-05-01 NOTE — PROVIDER CONTACT NOTE (CRITICAL VALUE NOTIFICATION) - ACTION/TREATMENT ORDERED:
PA made aware. no active signs of bleeding. Heparin stopped. Will continue to reassess.
Will give 2 amps of d5 per doctor ga. Will reassess.
primary RN made aware
primary nurse made aware
stop vanco
Providers made aware. Will continue to reassess. per team no further actions at this time.

## 2023-06-08 NOTE — RAPID RESPONSE TEAM SUMMARY - NSTRANSFERTYPERRT_GEN_ALL_CORE
Pt presents to office today for b/p check. Pt does confirm that she has taken her b/p meds this morning before coming to the clinic. B/p checked x 2 in left arm and was slightly elevated both times. Both sets recorded onto vitals. Will route note to pcp for review.  
MICU

## 2024-12-05 NOTE — ED ADULT NURSE NOTE - COMFORT/ACCEPTABLE PAIN LEVEL (0-10)
Patient notified of Dr. Chaparro's recommendations. He will call our office back to schedule.      0